# Patient Record
Sex: FEMALE | Race: WHITE | NOT HISPANIC OR LATINO | Employment: FULL TIME | URBAN - METROPOLITAN AREA
[De-identification: names, ages, dates, MRNs, and addresses within clinical notes are randomized per-mention and may not be internally consistent; named-entity substitution may affect disease eponyms.]

---

## 2023-04-13 DIAGNOSIS — E66.3 SEVERELY OVERWEIGHT: ICD-10-CM

## 2023-04-13 RX ORDER — TOPIRAMATE 50 MG/1
2 TABLET, FILM COATED ORAL NIGHTLY
COMMUNITY
Start: 2015-05-08 | End: 2023-04-13 | Stop reason: SDUPTHER

## 2023-04-14 RX ORDER — TOPIRAMATE 50 MG/1
100 TABLET, FILM COATED ORAL NIGHTLY
Qty: 180 TABLET | Refills: 0 | Status: SHIPPED | OUTPATIENT
Start: 2023-04-14 | End: 2023-12-19 | Stop reason: HOSPADM

## 2023-05-02 ENCOUNTER — DOCUMENTATION (OUTPATIENT)
Dept: PRIMARY CARE | Facility: CLINIC | Age: 53
End: 2023-05-02
Payer: COMMERCIAL

## 2023-05-03 ENCOUNTER — PATIENT OUTREACH (OUTPATIENT)
Dept: CARE COORDINATION | Facility: CLINIC | Age: 53
End: 2023-05-03
Payer: COMMERCIAL

## 2023-05-05 DIAGNOSIS — E78.5 HYPERLIPIDEMIA, UNSPECIFIED HYPERLIPIDEMIA TYPE: ICD-10-CM

## 2023-05-05 RX ORDER — ATORVASTATIN CALCIUM 40 MG/1
40 TABLET, FILM COATED ORAL DAILY
Qty: 90 TABLET | Refills: 0 | Status: SHIPPED | OUTPATIENT
Start: 2023-05-05 | End: 2023-12-19 | Stop reason: HOSPADM

## 2023-05-05 RX ORDER — ATORVASTATIN CALCIUM 40 MG/1
40 TABLET, FILM COATED ORAL DAILY
COMMUNITY
End: 2023-05-05 | Stop reason: SDUPTHER

## 2023-05-08 NOTE — PROGRESS NOTES
"Subjective   Chief Complaint   Patient presents with    Hospital F/U      Paulie is here today for after hospital F/U. Pt went to [facility] on 4/28/23-5/1/23 D/T pancreatitis and Potassium level was 2.5 and Lipase level of 816.        Patient ID: Paulie Byrd is a 52 y.o. female who presents for Hospital F/U  (Paulie is here today for after hospital F/U. Pt went to [facility] on 4/28/23-5/1/23 D/T pancreatitis and Potassium level was 2.5 and Lipase level of 816. )    HPI  Est 51 yo female presents today for hospital d/c follow up    Pt reports that she was admitted at [facility], [place] from 4/28/23-5/1/23  She states she left work on 4/27 in severe pain  Woke up 4/28 in severe pain and her friend took her to [facility], [place]  Lipase > 800; pt is recovering drug/alcohol addict, sober x 16 yrs   Pt says it is her gastroparesis and she does see Dr. Fish, who Rx her Protonix   Pts last EGD in Jan 2023---> there was food in her stomach after fasting > 24 hrs   Pt did gastric emptying study in Jan 2023 and it took > 200 min for food to pass  Is waiting to get approval for US/EGD study at [facility]   Pt has also had significant weight loss     Pt denies any N/V/D today  Negative abd pain     Pt had MRCP on 4/29----> \"acute pancreatitis, small LEFT renal cysts\"    Pt saw dermatology today for hair thinning; labs done     Review of Systems  All 13 systems were reviewed and are within normal limits except positive and pertinent negative responses which are noted below or in HPI.      Objective   /74   Pulse 81   Wt 68 kg (150 lb)   BMI 23.85 kg/m²        Physical Exam  Vitals reviewed.   HENT:      Mouth/Throat:      Mouth: Mucous membranes are moist.   Cardiovascular:      Pulses: Normal pulses.   Pulmonary:      Effort: Pulmonary effort is normal.   Abdominal:      General: Abdomen is flat. Bowel sounds are normal.   Skin:     General: Skin is warm and dry.   Neurological:      Mental Status: She is alert.   Psychiatric:    "      Mood and Affect: Mood normal.         Assessment/Plan   Problem List Items Addressed This Visit    None  Visit Diagnoses       Hospital discharge follow-up    -  Primary    Relevant Orders    Comprehensive Metabolic Panel    Magnesium    Vitamin B12    TSH with reflex to Free T4 if abnormal    Gastroparesis        Relevant Orders    Comprehensive Metabolic Panel    Magnesium    Vitamin B12    TSH with reflex to Free T4 if abnormal

## 2023-05-09 ENCOUNTER — OFFICE VISIT (OUTPATIENT)
Dept: PRIMARY CARE | Facility: CLINIC | Age: 53
End: 2023-05-09
Payer: COMMERCIAL

## 2023-05-09 ENCOUNTER — LAB (OUTPATIENT)
Dept: LAB | Facility: LAB | Age: 53
End: 2023-05-09
Payer: COMMERCIAL

## 2023-05-09 VITALS
SYSTOLIC BLOOD PRESSURE: 109 MMHG | BODY MASS INDEX: 23.85 KG/M2 | DIASTOLIC BLOOD PRESSURE: 74 MMHG | HEART RATE: 81 BPM | WEIGHT: 150 LBS

## 2023-05-09 DIAGNOSIS — K31.84 GASTROPARESIS: ICD-10-CM

## 2023-05-09 DIAGNOSIS — Z09 HOSPITAL DISCHARGE FOLLOW-UP: ICD-10-CM

## 2023-05-09 DIAGNOSIS — Z09 HOSPITAL DISCHARGE FOLLOW-UP: Primary | ICD-10-CM

## 2023-05-09 PROBLEM — N20.0 RIGHT NEPHROLITHIASIS: Status: ACTIVE | Noted: 2023-05-09

## 2023-05-09 PROBLEM — E05.00 GRAVES DISEASE: Status: ACTIVE | Noted: 2023-05-09

## 2023-05-09 PROBLEM — G47.9 SLEEP DISTURBANCE: Status: ACTIVE | Noted: 2023-05-09

## 2023-05-09 PROBLEM — F10.11 HISTORY OF ALCOHOL ABUSE: Status: ACTIVE | Noted: 2023-05-09

## 2023-05-09 PROBLEM — E78.5 HYPERLIPIDEMIA: Status: ACTIVE | Noted: 2023-05-09

## 2023-05-09 PROBLEM — M79.89 RIGHT LEG SWELLING: Status: ACTIVE | Noted: 2023-05-09

## 2023-05-09 PROBLEM — M25.50 ARTHRALGIA OF MULTIPLE JOINTS: Status: ACTIVE | Noted: 2023-05-09

## 2023-05-09 PROBLEM — E87.6 HYPOKALEMIA: Status: ACTIVE | Noted: 2023-05-09

## 2023-05-09 PROBLEM — R11.0 NAUSEA IN ADULT: Status: ACTIVE | Noted: 2023-05-09

## 2023-05-09 PROBLEM — F41.8 DEPRESSION WITH ANXIETY: Status: ACTIVE | Noted: 2023-05-09

## 2023-05-09 PROBLEM — N95.1 MENOPAUSAL SYMPTOMS: Status: ACTIVE | Noted: 2023-05-09

## 2023-05-09 PROBLEM — E55.9 VITAMIN D DEFICIENCY DISEASE: Status: ACTIVE | Noted: 2023-05-09

## 2023-05-09 PROBLEM — G25.81 RESTLESS LEGS SYNDROME: Status: ACTIVE | Noted: 2023-05-09

## 2023-05-09 PROBLEM — E03.8 SECONDARY HYPOTHYROIDISM: Status: ACTIVE | Noted: 2023-05-09

## 2023-05-09 LAB
ALANINE AMINOTRANSFERASE (SGPT) (U/L) IN SER/PLAS: 14 U/L (ref 7–45)
ALBUMIN (G/DL) IN SER/PLAS: 3.9 G/DL (ref 3.4–5)
ALKALINE PHOSPHATASE (U/L) IN SER/PLAS: 68 U/L (ref 33–110)
ANION GAP IN SER/PLAS: 12 MMOL/L (ref 10–20)
ASPARTATE AMINOTRANSFERASE (SGOT) (U/L) IN SER/PLAS: 13 U/L (ref 9–39)
BILIRUBIN TOTAL (MG/DL) IN SER/PLAS: 0.3 MG/DL (ref 0–1.2)
CALCIUM (MG/DL) IN SER/PLAS: 9 MG/DL (ref 8.6–10.3)
CARBON DIOXIDE, TOTAL (MMOL/L) IN SER/PLAS: 23 MMOL/L (ref 21–32)
CHLORIDE (MMOL/L) IN SER/PLAS: 111 MMOL/L (ref 98–107)
CREATININE (MG/DL) IN SER/PLAS: 0.87 MG/DL (ref 0.5–1.05)
FERRITIN (UG/LL) IN SER/PLAS: 38 UG/L (ref 8–150)
GFR FEMALE: 79 ML/MIN/1.73M2
GLUCOSE (MG/DL) IN SER/PLAS: 84 MG/DL (ref 74–99)
IRON (UG/DL) IN SER/PLAS: 65 UG/DL (ref 35–150)
IRON BINDING CAPACITY (UG/DL) IN SER/PLAS: 282 UG/DL (ref 240–445)
IRON SATURATION (%) IN SER/PLAS: 23 % (ref 25–45)
MAGNESIUM (MG/DL) IN SER/PLAS: 2.1 MG/DL (ref 1.6–2.4)
POTASSIUM (MMOL/L) IN SER/PLAS: 3.4 MMOL/L (ref 3.5–5.3)
PROTEIN TOTAL: 6.7 G/DL (ref 6.4–8.2)
SODIUM (MMOL/L) IN SER/PLAS: 143 MMOL/L (ref 136–145)
THYROTROPIN (MIU/L) IN SER/PLAS BY DETECTION LIMIT <= 0.05 MIU/L: 0.38 MIU/L (ref 0.44–3.98)
THYROXINE (T4) FREE (NG/DL) IN SER/PLAS: 1.98 NG/DL (ref 0.61–1.12)
UREA NITROGEN (MG/DL) IN SER/PLAS: 8 MG/DL (ref 6–23)

## 2023-05-09 PROCEDURE — 84439 ASSAY OF FREE THYROXINE: CPT

## 2023-05-09 PROCEDURE — 82607 VITAMIN B-12: CPT

## 2023-05-09 PROCEDURE — 83735 ASSAY OF MAGNESIUM: CPT

## 2023-05-09 PROCEDURE — 99213 OFFICE O/P EST LOW 20 MIN: CPT | Performed by: NURSE PRACTITIONER

## 2023-05-09 PROCEDURE — 36415 COLL VENOUS BLD VENIPUNCTURE: CPT

## 2023-05-09 PROCEDURE — 80053 COMPREHEN METABOLIC PANEL: CPT

## 2023-05-09 PROCEDURE — 84443 ASSAY THYROID STIM HORMONE: CPT

## 2023-05-09 PROCEDURE — 99495 TRANSJ CARE MGMT MOD F2F 14D: CPT | Performed by: NURSE PRACTITIONER

## 2023-05-09 RX ORDER — METOCLOPRAMIDE 5 MG/1
1 TABLET ORAL
COMMUNITY
Start: 2023-04-06 | End: 2023-06-30 | Stop reason: ALTCHOICE

## 2023-05-09 RX ORDER — LEVOTHYROXINE SODIUM 100 UG/1
100 TABLET ORAL
COMMUNITY
End: 2023-05-10 | Stop reason: SDUPTHER

## 2023-05-09 RX ORDER — DICYCLOMINE HYDROCHLORIDE 10 MG/1
CAPSULE ORAL
COMMUNITY
Start: 2023-01-19 | End: 2023-10-27 | Stop reason: ALTCHOICE

## 2023-05-09 RX ORDER — POTASSIUM CHLORIDE 1500 MG/1
1 TABLET, EXTENDED RELEASE ORAL 2 TIMES DAILY
COMMUNITY
Start: 2022-01-28 | End: 2023-05-10 | Stop reason: ALTCHOICE

## 2023-05-09 RX ORDER — DOCUSATE SODIUM 100 MG/1
CAPSULE, LIQUID FILLED ORAL 2 TIMES DAILY
COMMUNITY
Start: 2016-09-01 | End: 2023-12-15 | Stop reason: WASHOUT

## 2023-05-09 RX ORDER — PANTOPRAZOLE SODIUM 40 MG/1
40 TABLET, DELAYED RELEASE ORAL 2 TIMES DAILY
COMMUNITY
Start: 2023-05-02 | End: 2023-06-30 | Stop reason: ALTCHOICE

## 2023-05-09 RX ORDER — FLUTICASONE PROPIONATE 50 MCG
2 SPRAY, SUSPENSION (ML) NASAL DAILY PRN
COMMUNITY
Start: 2015-03-02 | End: 2023-12-19 | Stop reason: HOSPADM

## 2023-05-09 RX ORDER — GABAPENTIN 300 MG/1
CAPSULE ORAL
COMMUNITY
End: 2023-06-06 | Stop reason: SDUPTHER

## 2023-05-09 RX ORDER — BUPROPION HYDROCHLORIDE 300 MG/1
300 TABLET ORAL DAILY
COMMUNITY
End: 2023-10-27 | Stop reason: ALTCHOICE

## 2023-05-09 RX ORDER — DIPHENHYDRAMINE HCL 25 MG
50 CAPSULE ORAL NIGHTLY
COMMUNITY
Start: 2012-04-10 | End: 2023-12-19 | Stop reason: HOSPADM

## 2023-05-09 RX ORDER — IBUPROFEN 600 MG/1
TABLET ORAL 3 TIMES DAILY PRN
COMMUNITY
Start: 2016-09-01 | End: 2023-10-27 | Stop reason: ALTCHOICE

## 2023-05-09 NOTE — PATIENT INSTRUCTIONS
Thank you for seeing me today.  It was a pleasure to see you again!    See GI for f/u on gastroparesis    Schedule EGD with US per GI    RTC AS NEEDED

## 2023-05-10 DIAGNOSIS — E03.8 SECONDARY HYPOTHYROIDISM: ICD-10-CM

## 2023-05-10 DIAGNOSIS — E87.6 HYPOKALEMIA: Primary | ICD-10-CM

## 2023-05-10 LAB
ANTI-NUCLEAR ANTIBODY (ANA): NEGATIVE
CALCIDIOL (25 OH VITAMIN D3) (NG/ML) IN SER/PLAS: 71 NG/ML
COBALAMIN (VITAMIN B12) (PG/ML) IN SER/PLAS: 1337 PG/ML (ref 211–911)

## 2023-05-10 RX ORDER — LEVOTHYROXINE SODIUM 100 UG/1
TABLET ORAL
Qty: 90 TABLET | Refills: 3 | Status: SHIPPED | OUTPATIENT
Start: 2023-05-10 | End: 2023-07-31 | Stop reason: SDUPTHER

## 2023-05-10 RX ORDER — POTASSIUM CHLORIDE 750 MG/1
10 TABLET, FILM COATED, EXTENDED RELEASE ORAL DAILY
Qty: 30 TABLET | Refills: 0 | Status: SHIPPED | OUTPATIENT
Start: 2023-05-10 | End: 2023-05-24 | Stop reason: SDUPTHER

## 2023-05-22 ENCOUNTER — TELEPHONE (OUTPATIENT)
Dept: PRIMARY CARE | Facility: CLINIC | Age: 53
End: 2023-05-22
Payer: COMMERCIAL

## 2023-05-22 DIAGNOSIS — E87.6 HYPOKALEMIA: ICD-10-CM

## 2023-05-22 NOTE — TELEPHONE ENCOUNTER
Orders Placed This Encounter   Procedures    Potassium     Standing Status:   Future     Standing Expiration Date:   5/22/2024     Order Specific Question:   Release result to MyChart     Answer:   Immediate    Magnesium     Standing Status:   Future     Standing Expiration Date:   5/22/2024     Order Specific Question:   Release result to MyChart     Answer:   Immediate

## 2023-05-24 DIAGNOSIS — E87.6 HYPOKALEMIA: ICD-10-CM

## 2023-05-24 RX ORDER — POTASSIUM CHLORIDE 750 MG/1
10 TABLET, FILM COATED, EXTENDED RELEASE ORAL DAILY
Qty: 30 TABLET | Refills: 2 | Status: SHIPPED | OUTPATIENT
Start: 2023-05-24 | End: 2023-06-15

## 2023-06-06 DIAGNOSIS — G25.81 RESTLESS LEGS SYNDROME: ICD-10-CM

## 2023-06-07 RX ORDER — GABAPENTIN 300 MG/1
CAPSULE ORAL
Qty: 120 CAPSULE | Refills: 1 | Status: SHIPPED | OUTPATIENT
Start: 2023-06-07 | End: 2023-07-31 | Stop reason: SDUPTHER

## 2023-06-15 ENCOUNTER — LAB (OUTPATIENT)
Dept: LAB | Facility: LAB | Age: 53
End: 2023-06-15
Payer: COMMERCIAL

## 2023-06-15 DIAGNOSIS — R63.4 WEIGHT LOSS, UNINTENTIONAL: ICD-10-CM

## 2023-06-15 DIAGNOSIS — E87.6 HYPOKALEMIA: ICD-10-CM

## 2023-06-15 DIAGNOSIS — B89 PARASITE INFECTION: Primary | ICD-10-CM

## 2023-06-15 DIAGNOSIS — R63.4 WEIGHT LOSS, UNINTENTIONAL: Primary | ICD-10-CM

## 2023-06-15 DIAGNOSIS — E03.8 SECONDARY HYPOTHYROIDISM: ICD-10-CM

## 2023-06-15 DIAGNOSIS — R19.7 DIARRHEA, UNSPECIFIED TYPE: ICD-10-CM

## 2023-06-15 DIAGNOSIS — R63.4 UNINTENTIONAL WEIGHT LOSS: Primary | ICD-10-CM

## 2023-06-15 DIAGNOSIS — R53.1 WEAKNESS GENERALIZED: ICD-10-CM

## 2023-06-15 DIAGNOSIS — Z00.00 HEALTHCARE MAINTENANCE: Primary | ICD-10-CM

## 2023-06-15 DIAGNOSIS — Z00.00 HEALTHCARE MAINTENANCE: ICD-10-CM

## 2023-06-15 DIAGNOSIS — R63.4 UNINTENTIONAL WEIGHT LOSS: ICD-10-CM

## 2023-06-15 LAB
ALANINE AMINOTRANSFERASE (SGPT) (U/L) IN SER/PLAS: 20 U/L (ref 7–45)
ALBUMIN (G/DL) IN SER/PLAS: 4.6 G/DL (ref 3.4–5)
ALKALINE PHOSPHATASE (U/L) IN SER/PLAS: 78 U/L (ref 33–110)
ANION GAP IN SER/PLAS: 18 MMOL/L (ref 10–20)
ASPARTATE AMINOTRANSFERASE (SGOT) (U/L) IN SER/PLAS: 18 U/L (ref 9–39)
BASOPHILS (10*3/UL) IN BLOOD BY AUTOMATED COUNT: 0.07 X10E9/L (ref 0–0.1)
BASOPHILS/100 LEUKOCYTES IN BLOOD BY AUTOMATED COUNT: 0.6 % (ref 0–2)
BILIRUBIN TOTAL (MG/DL) IN SER/PLAS: 0.5 MG/DL (ref 0–1.2)
C REACTIVE PROTEIN (MG/L) IN SER/PLAS: 0.13 MG/DL
CALCIUM (MG/DL) IN SER/PLAS: 9.4 MG/DL (ref 8.6–10.3)
CARBON DIOXIDE, TOTAL (MMOL/L) IN SER/PLAS: 18 MMOL/L (ref 21–32)
CHLORIDE (MMOL/L) IN SER/PLAS: 105 MMOL/L (ref 98–107)
CREATININE (MG/DL) IN SER/PLAS: 1.1 MG/DL (ref 0.5–1.05)
EOSINOPHILS (10*3/UL) IN BLOOD BY AUTOMATED COUNT: 0.07 X10E9/L (ref 0–0.7)
EOSINOPHILS/100 LEUKOCYTES IN BLOOD BY AUTOMATED COUNT: 0.6 % (ref 0–6)
ERYTHROCYTE DISTRIBUTION WIDTH (RATIO) BY AUTOMATED COUNT: 14.5 % (ref 11.5–14.5)
ERYTHROCYTE MEAN CORPUSCULAR HEMOGLOBIN CONCENTRATION (G/DL) BY AUTOMATED: 33.5 G/DL (ref 32–36)
ERYTHROCYTE MEAN CORPUSCULAR VOLUME (FL) BY AUTOMATED COUNT: 92 FL (ref 80–100)
ERYTHROCYTES (10*6/UL) IN BLOOD BY AUTOMATED COUNT: 4.47 X10E12/L (ref 4–5.2)
GFR FEMALE: 60 ML/MIN/1.73M2
GLUCOSE (MG/DL) IN SER/PLAS: 102 MG/DL (ref 74–99)
HEMATOCRIT (%) IN BLOOD BY AUTOMATED COUNT: 41.2 % (ref 36–46)
HEMOGLOBIN (G/DL) IN BLOOD: 13.8 G/DL (ref 12–16)
HEPATITIS A VIRUS IGM AB PRESENCE IN SER/PLAS BY IMMUNOASSAY: NONREACTIVE
HEPATITIS B VIRUS CORE IGM AB PRESENCE IN SER/PLAS BY IMMUNOASSY: NONREACTIVE
HEPATITIS B VIRUS SURFACE AG PRESENCE IN SERUM: NONREACTIVE
HEPATITIS C VIRUS AB PRESENCE IN SERUM: NONREACTIVE
HIV 1/ 2 AG/AB SCREEN: NONREACTIVE
IMMATURE GRANULOCYTES/100 LEUKOCYTES IN BLOOD BY AUTOMATED COUNT: 0.4 % (ref 0–0.9)
IRON (UG/DL) IN SER/PLAS: 124 UG/DL (ref 35–150)
IRON BINDING CAPACITY (UG/DL) IN SER/PLAS: 304 UG/DL (ref 240–445)
IRON SATURATION (%) IN SER/PLAS: 41 % (ref 25–45)
LEUKOCYTES (10*3/UL) IN BLOOD BY AUTOMATED COUNT: 11.7 X10E9/L (ref 4.4–11.3)
LYMPHOCYTES (10*3/UL) IN BLOOD BY AUTOMATED COUNT: 4.42 X10E9/L (ref 1.2–4.8)
LYMPHOCYTES/100 LEUKOCYTES IN BLOOD BY AUTOMATED COUNT: 37.8 % (ref 13–44)
MAGNESIUM (MG/DL) IN SER/PLAS: 2.08 MG/DL (ref 1.6–2.4)
MONOCYTES (10*3/UL) IN BLOOD BY AUTOMATED COUNT: 0.6 X10E9/L (ref 0.1–1)
MONOCYTES/100 LEUKOCYTES IN BLOOD BY AUTOMATED COUNT: 5.1 % (ref 2–10)
NEUTROPHILS (10*3/UL) IN BLOOD BY AUTOMATED COUNT: 6.48 X10E9/L (ref 1.2–7.7)
NEUTROPHILS/100 LEUKOCYTES IN BLOOD BY AUTOMATED COUNT: 55.5 % (ref 40–80)
PLATELETS (10*3/UL) IN BLOOD AUTOMATED COUNT: 451 X10E9/L (ref 150–450)
POTASSIUM (MMOL/L) IN SER/PLAS: 2.9 MMOL/L (ref 3.5–5.3)
PROTEIN TOTAL: 7.1 G/DL (ref 6.4–8.2)
SEDIMENTATION RATE, ERYTHROCYTE: 16 MM/H (ref 0–30)
SODIUM (MMOL/L) IN SER/PLAS: 138 MMOL/L (ref 136–145)
THYROTROPIN (MIU/L) IN SER/PLAS BY DETECTION LIMIT <= 0.05 MIU/L: 2.1 MIU/L (ref 0.44–3.98)
UREA NITROGEN (MG/DL) IN SER/PLAS: 15 MG/DL (ref 6–23)

## 2023-06-15 PROCEDURE — 83540 ASSAY OF IRON: CPT

## 2023-06-15 PROCEDURE — 86140 C-REACTIVE PROTEIN: CPT

## 2023-06-15 PROCEDURE — 85652 RBC SED RATE AUTOMATED: CPT

## 2023-06-15 PROCEDURE — 83735 ASSAY OF MAGNESIUM: CPT

## 2023-06-15 PROCEDURE — 83550 IRON BINDING TEST: CPT

## 2023-06-15 PROCEDURE — 87389 HIV-1 AG W/HIV-1&-2 AB AG IA: CPT

## 2023-06-15 PROCEDURE — 80074 ACUTE HEPATITIS PANEL: CPT

## 2023-06-15 PROCEDURE — 84443 ASSAY THYROID STIM HORMONE: CPT

## 2023-06-15 PROCEDURE — 86663 EPSTEIN-BARR ANTIBODY: CPT

## 2023-06-15 PROCEDURE — 87506 IADNA-DNA/RNA PROBE TQ 6-11: CPT

## 2023-06-15 PROCEDURE — 86665 EPSTEIN-BARR CAPSID VCA: CPT

## 2023-06-15 PROCEDURE — 36415 COLL VENOUS BLD VENIPUNCTURE: CPT

## 2023-06-15 PROCEDURE — 86664 EPSTEIN-BARR NUCLEAR ANTIGEN: CPT

## 2023-06-15 PROCEDURE — 85025 COMPLETE CBC W/AUTO DIFF WBC: CPT

## 2023-06-15 PROCEDURE — 83516 IMMUNOASSAY NONANTIBODY: CPT

## 2023-06-15 PROCEDURE — 80053 COMPREHEN METABOLIC PANEL: CPT

## 2023-06-15 RX ORDER — POTASSIUM CHLORIDE 1500 MG/1
40 TABLET, EXTENDED RELEASE ORAL 3 TIMES DAILY
Qty: 30 TABLET | Refills: 0 | Status: SHIPPED | OUTPATIENT
Start: 2023-06-15 | End: 2023-07-26

## 2023-06-16 ENCOUNTER — LAB (OUTPATIENT)
Dept: LAB | Facility: LAB | Age: 53
End: 2023-06-16
Payer: COMMERCIAL

## 2023-06-16 DIAGNOSIS — R63.4 WEIGHT LOSS, UNINTENTIONAL: ICD-10-CM

## 2023-06-16 DIAGNOSIS — E87.6 HYPOKALEMIA: ICD-10-CM

## 2023-06-16 LAB
DEAMIDATED GLIADIN PEPTIDE IGA: <1 U/ML (ref 0–14)
DEAMIDATED GLIADIN PEPTIDE IGA: <1 U/ML (ref 0–14)
DEAMIDATED GLIADIN PEPTIDE IGG: <1 U/ML (ref 0–14)
DEAMIDATED GLIADIN PEPTIDE IGG: <1 U/ML (ref 0–14)
EBV INTERPRETATION: ABNORMAL
EPSTEIN-BARR VCA IGG: POSITIVE
EPSTEIN-BARR VCA IGM: POSITIVE
EPSTEIN-BARR VIRUS EARLY ANTIGEN ANTIBODY, IGG: NEGATIVE
EPSTIEN-BARR NUCLEAR ANTIGEN AB: POSITIVE
HIV 1/ 2 AG/AB SCREEN: NONREACTIVE
POTASSIUM (MMOL/L) IN SER/PLAS: 3.3 MMOL/L (ref 3.5–5.3)
TISSUE TRANSGLUTAMINASE IGG: <1 U/ML (ref 0–14)
TISSUE TRANSGLUTAMINASE IGG: <1 U/ML (ref 0–14)
TISSUE TRANSGLUTAMINASE, IGA: 1 U/ML (ref 0–14)
TISSUE TRANSGLUTAMINASE, IGA: <1 U/ML (ref 0–14)

## 2023-06-16 PROCEDURE — 84132 ASSAY OF SERUM POTASSIUM: CPT

## 2023-06-16 PROCEDURE — 87389 HIV-1 AG W/HIV-1&-2 AB AG IA: CPT

## 2023-06-16 PROCEDURE — 83516 IMMUNOASSAY NONANTIBODY: CPT

## 2023-06-16 PROCEDURE — 36415 COLL VENOUS BLD VENIPUNCTURE: CPT

## 2023-06-17 DIAGNOSIS — E87.6 HYPOKALEMIA: ICD-10-CM

## 2023-06-17 DIAGNOSIS — R10.13 EPIGASTRIC PAIN: Primary | ICD-10-CM

## 2023-06-18 LAB
CAMPYLOBACTER GP: NOT DETECTED
NOROVIRUS GI/GII: NOT DETECTED
ROTAVIRUS A: NOT DETECTED
SALMONELLA SP.: NOT DETECTED
SHIGA TOXIN 1: NOT DETECTED
SHIGA TOXIN 2: NOT DETECTED
SHIGELLA SP.: NOT DETECTED
VIBRIO GRP.: NOT DETECTED
YERSINIA ENTEROCOLITICA: NOT DETECTED

## 2023-06-19 ENCOUNTER — LAB (OUTPATIENT)
Dept: LAB | Facility: LAB | Age: 53
End: 2023-06-19
Payer: COMMERCIAL

## 2023-06-19 DIAGNOSIS — E87.6 HYPOKALEMIA: ICD-10-CM

## 2023-06-19 LAB
ANION GAP IN SER/PLAS: 14 MMOL/L (ref 10–20)
C. DIFFICILE TOXIN, PCR: NORMAL
CALCIUM (MG/DL) IN SER/PLAS: 8.3 MG/DL (ref 8.6–10.3)
CARBON DIOXIDE, TOTAL (MMOL/L) IN SER/PLAS: 19 MMOL/L (ref 21–32)
CHLORIDE (MMOL/L) IN SER/PLAS: 112 MMOL/L (ref 98–107)
CLOSTRIDIUM DIFFICILE NAP 1 STRAIN (PRESUMPTIVE): NORMAL
CREATININE (MG/DL) IN SER/PLAS: 0.9 MG/DL (ref 0.5–1.05)
GFR FEMALE: 76 ML/MIN/1.73M2
GLUCOSE (MG/DL) IN SER/PLAS: 96 MG/DL (ref 74–99)
POTASSIUM (MMOL/L) IN SER/PLAS: 3.9 MMOL/L (ref 3.5–5.3)
SODIUM (MMOL/L) IN SER/PLAS: 141 MMOL/L (ref 136–145)
UREA NITROGEN (MG/DL) IN SER/PLAS: 11 MG/DL (ref 6–23)

## 2023-06-19 PROCEDURE — 80048 BASIC METABOLIC PNL TOTAL CA: CPT

## 2023-06-19 PROCEDURE — 36415 COLL VENOUS BLD VENIPUNCTURE: CPT

## 2023-06-20 LAB
CALPROTECTIN, STOOL: NORMAL
CRYPTOSPORIDIUM ANTIGEN-DATA CONVERSION: NORMAL
GIARDIA LAMBLIA AG-DATA CONVERSION: NORMAL
OVA + PARASITE EXAM: NORMAL
PANCREATIC ELASTASE, FECAL: NORMAL

## 2023-06-21 ENCOUNTER — OFFICE VISIT (OUTPATIENT)
Dept: PRIMARY CARE | Facility: CLINIC | Age: 53
End: 2023-06-21
Payer: COMMERCIAL

## 2023-06-21 ENCOUNTER — LAB (OUTPATIENT)
Dept: LAB | Facility: LAB | Age: 53
End: 2023-06-21
Payer: COMMERCIAL

## 2023-06-21 VITALS — HEART RATE: 96 BPM | DIASTOLIC BLOOD PRESSURE: 69 MMHG | SYSTOLIC BLOOD PRESSURE: 96 MMHG | OXYGEN SATURATION: 97 %

## 2023-06-21 DIAGNOSIS — E87.6 HYPOKALEMIA: ICD-10-CM

## 2023-06-21 DIAGNOSIS — R19.7 DIARRHEA, UNSPECIFIED TYPE: ICD-10-CM

## 2023-06-21 DIAGNOSIS — R10.13 EPIGASTRIC PAIN: ICD-10-CM

## 2023-06-21 LAB
ANION GAP IN SER/PLAS: 18 MMOL/L (ref 10–20)
CALCIUM (MG/DL) IN SER/PLAS: 9.6 MG/DL (ref 8.6–10.3)
CARBON DIOXIDE, TOTAL (MMOL/L) IN SER/PLAS: 17 MMOL/L (ref 21–32)
CHLORIDE (MMOL/L) IN SER/PLAS: 110 MMOL/L (ref 98–107)
CREATININE (MG/DL) IN SER/PLAS: 1.15 MG/DL (ref 0.5–1.05)
GFR FEMALE: 57 ML/MIN/1.73M2
GLUCOSE (MG/DL) IN SER/PLAS: 90 MG/DL (ref 74–99)
LIPASE (U/L) IN SER/PLAS: 73 U/L (ref 9–82)
POTASSIUM (MMOL/L) IN SER/PLAS: 4.2 MMOL/L (ref 3.5–5.3)
SODIUM (MMOL/L) IN SER/PLAS: 141 MMOL/L (ref 136–145)
UREA NITROGEN (MG/DL) IN SER/PLAS: 15 MG/DL (ref 6–23)

## 2023-06-21 PROCEDURE — 87493 C DIFF AMPLIFIED PROBE: CPT

## 2023-06-21 PROCEDURE — 83690 ASSAY OF LIPASE: CPT

## 2023-06-21 PROCEDURE — 83993 ASSAY FOR CALPROTECTIN FECAL: CPT

## 2023-06-21 PROCEDURE — 36415 COLL VENOUS BLD VENIPUNCTURE: CPT

## 2023-06-21 PROCEDURE — 87328 CRYPTOSPORIDIUM AG IA: CPT

## 2023-06-21 PROCEDURE — 83520 IMMUNOASSAY QUANT NOS NONAB: CPT

## 2023-06-21 PROCEDURE — 80048 BASIC METABOLIC PNL TOTAL CA: CPT

## 2023-06-21 PROCEDURE — 99213 OFFICE O/P EST LOW 20 MIN: CPT | Performed by: INTERNAL MEDICINE

## 2023-06-21 PROCEDURE — 87329 GIARDIA AG IA: CPT

## 2023-06-21 PROCEDURE — 87177 OVA AND PARASITES SMEARS: CPT

## 2023-06-21 RX ORDER — SPIRONOLACTONE 25 MG/1
50 TABLET ORAL DAILY
COMMUNITY
Start: 2023-06-07 | End: 2023-10-13

## 2023-06-21 NOTE — PROGRESS NOTES
Subjective   Patient ID: Paulie Byrd is a 52 y.o. female who presents for Abdominal Pain.    HPI   Patient reports water, non bloody diarrhea and abd pain x several weeks. Pain mostly epigastric and mid abdomen. Some nausea but no vomiting. Worse after eating. No EtOH use, had previous lap myah. No fevers, chills, night sweats. No recent travel or abx use     Review of Systems   All other systems reviewed and are negative.      Objective   There were no vitals taken for this visit.    Physical Exam  Constitutional:       Appearance: Normal appearance.   HENT:      Head: Normocephalic and atraumatic.   Cardiovascular:      Rate and Rhythm: Normal rate and regular rhythm.      Heart sounds: Normal heart sounds. No murmur heard.     No gallop.   Pulmonary:      Effort: Pulmonary effort is normal. No respiratory distress.      Breath sounds: No wheezing or rales.   Abdominal:      General: Abdomen is flat. There is no distension.      Palpations: Abdomen is soft.      Tenderness: There is abdominal tenderness. There is no guarding or rebound.   Skin:     General: Skin is warm and dry.      Findings: No rash.   Neurological:      Mental Status: She is alert and oriented to person, place, and time. Mental status is at baseline.   Psychiatric:         Mood and Affect: Mood normal.         Behavior: Behavior normal.         Assessment/Plan       #Abdominal pain , diarrhea   -Stool studies pending  -order CT a/p STAT to r/o pancreatitis  -Replete K and Mg   -Refer to GI

## 2023-06-22 LAB — C. DIFFICILE TOXIN, PCR: NOT DETECTED

## 2023-06-22 RX ORDER — TINIDAZOLE 500 MG/1
2 TABLET ORAL ONCE
Qty: 4 TABLET | Refills: 0 | Status: SHIPPED | OUTPATIENT
Start: 2023-06-22 | End: 2023-06-22

## 2023-06-27 LAB
CRYPTOSPORIDIUM ANTIGEN-DATA CONVERSION: NEGATIVE
GIARDIA LAMBLIA AG-DATA CONVERSION: NEGATIVE
OVA + PARASITE EXAM: NEGATIVE

## 2023-06-29 LAB
CALPROTECTIN, STOOL: <5 UG/G
PANCREATIC ELASTASE, FECAL: <10 UG/G

## 2023-06-30 DIAGNOSIS — K21.9 GASTROESOPHAGEAL REFLUX DISEASE WITHOUT ESOPHAGITIS: Primary | ICD-10-CM

## 2023-06-30 LAB
C. DIFFICILE TOXIN, PCR: NOT DETECTED
CAMPYLOBACTER GP: NOT DETECTED
NOROVIRUS GI/GII: NOT DETECTED
ROTAVIRUS A: NOT DETECTED
SALMONELLA SP.: NOT DETECTED
SHIGA TOXIN 1: NOT DETECTED
SHIGA TOXIN 2: NOT DETECTED
SHIGELLA SP.: NOT DETECTED
VIBRIO GRP.: NOT DETECTED
YERSINIA ENTEROCOLITICA: NOT DETECTED

## 2023-06-30 RX ORDER — OMEPRAZOLE 20 MG/1
20 CAPSULE, DELAYED RELEASE ORAL DAILY
Qty: 30 CAPSULE | Refills: 5 | Status: SHIPPED | OUTPATIENT
Start: 2023-06-30 | End: 2023-10-27 | Stop reason: SDUPTHER

## 2023-07-05 LAB
CALPROTECTIN, STOOL: 12 UG/G
LACTOFERRIN, FECAL, QUANT.: NEGATIVE
PANCREATIC ELASTASE, FECAL: 32 UG/G

## 2023-07-31 ENCOUNTER — LAB (OUTPATIENT)
Dept: LAB | Facility: LAB | Age: 53
End: 2023-07-31
Payer: COMMERCIAL

## 2023-07-31 DIAGNOSIS — R21 RASH: Primary | ICD-10-CM

## 2023-07-31 DIAGNOSIS — E87.6 HYPOKALEMIA: ICD-10-CM

## 2023-07-31 DIAGNOSIS — R21 RASH: ICD-10-CM

## 2023-07-31 DIAGNOSIS — E03.8 SECONDARY HYPOTHYROIDISM: ICD-10-CM

## 2023-07-31 DIAGNOSIS — G25.81 RESTLESS LEGS SYNDROME: ICD-10-CM

## 2023-07-31 DIAGNOSIS — B37.0 THRUSH: ICD-10-CM

## 2023-07-31 LAB
ANION GAP IN SER/PLAS: 14 MMOL/L (ref 10–20)
CALCIUM (MG/DL) IN SER/PLAS: 9.4 MG/DL (ref 8.6–10.6)
CARBON DIOXIDE, TOTAL (MMOL/L) IN SER/PLAS: 25 MMOL/L (ref 21–32)
CHLORIDE (MMOL/L) IN SER/PLAS: 109 MMOL/L (ref 98–107)
CREATININE (MG/DL) IN SER/PLAS: 0.84 MG/DL (ref 0.5–1.05)
GFR FEMALE: 83 ML/MIN/1.73M2
GLUCOSE (MG/DL) IN SER/PLAS: 98 MG/DL (ref 74–99)
POTASSIUM (MMOL/L) IN SER/PLAS: 4.2 MMOL/L (ref 3.5–5.3)
SODIUM (MMOL/L) IN SER/PLAS: 144 MMOL/L (ref 136–145)
UREA NITROGEN (MG/DL) IN SER/PLAS: 11 MG/DL (ref 6–23)

## 2023-07-31 PROCEDURE — 86765 RUBEOLA ANTIBODY: CPT

## 2023-07-31 PROCEDURE — 80048 BASIC METABOLIC PNL TOTAL CA: CPT

## 2023-07-31 PROCEDURE — 36415 COLL VENOUS BLD VENIPUNCTURE: CPT

## 2023-07-31 RX ORDER — GABAPENTIN 300 MG/1
CAPSULE ORAL
Qty: 360 CAPSULE | Refills: 3 | Status: SHIPPED | OUTPATIENT
Start: 2023-07-31 | End: 2024-05-01 | Stop reason: SDUPTHER

## 2023-07-31 RX ORDER — LEVOTHYROXINE SODIUM 100 UG/1
100 TABLET ORAL
Qty: 90 TABLET | Refills: 3 | Status: SHIPPED | OUTPATIENT
Start: 2023-07-31 | End: 2024-02-09 | Stop reason: ALTCHOICE

## 2023-07-31 RX ORDER — NYSTATIN 100000 [USP'U]/ML
5 SUSPENSION ORAL 4 TIMES DAILY
Qty: 280 ML | Refills: 0 | Status: SHIPPED | OUTPATIENT
Start: 2023-07-31 | End: 2023-08-14

## 2023-08-03 LAB — MEASLES, RUBEOLA, ANTIBODY IGM: 0.31 AU (ref 0–0.79)

## 2023-08-21 DIAGNOSIS — B37.9 CANDIDOSIS: Primary | ICD-10-CM

## 2023-08-21 RX ORDER — FLUCONAZOLE 150 MG/1
150 TABLET ORAL
Qty: 2 TABLET | Refills: 1 | Status: SHIPPED | OUTPATIENT
Start: 2023-08-21 | End: 2023-12-19 | Stop reason: HOSPADM

## 2023-08-21 NOTE — TELEPHONE ENCOUNTER
Requested Prescriptions     Pending Prescriptions Disp Refills    fluconazole (Diflucan) 150 mg tablet 2 tablet 1     Sig: Take 1 tablet (150 mg) by mouth 1 (one) time per week.

## 2023-10-13 DIAGNOSIS — L65.9 ALOPECIA: Primary | ICD-10-CM

## 2023-10-13 RX ORDER — SPIRONOLACTONE 25 MG/1
75 TABLET ORAL DAILY
Qty: 90 TABLET | Refills: 1 | Status: SHIPPED | OUTPATIENT
Start: 2023-10-13 | End: 2023-11-01

## 2023-10-27 DIAGNOSIS — L65.8 FEMALE PATTERN ALOPECIA: Primary | ICD-10-CM

## 2023-10-27 DIAGNOSIS — F41.8 DEPRESSION WITH ANXIETY: Primary | ICD-10-CM

## 2023-10-27 DIAGNOSIS — K21.9 GASTROESOPHAGEAL REFLUX DISEASE WITHOUT ESOPHAGITIS: ICD-10-CM

## 2023-10-27 RX ORDER — BUPROPION HYDROCHLORIDE 150 MG/1
150 TABLET ORAL EVERY MORNING
Qty: 30 TABLET | Refills: 3 | Status: SHIPPED | OUTPATIENT
Start: 2023-10-27 | End: 2024-06-07 | Stop reason: WASHOUT

## 2023-10-27 RX ORDER — OMEPRAZOLE 20 MG/1
20 CAPSULE, DELAYED RELEASE ORAL DAILY
Qty: 90 CAPSULE | Refills: 3 | Status: SHIPPED | OUTPATIENT
Start: 2023-10-27 | End: 2023-12-19 | Stop reason: HOSPADM

## 2023-10-31 ENCOUNTER — LAB (OUTPATIENT)
Dept: LAB | Facility: LAB | Age: 53
End: 2023-10-31
Payer: COMMERCIAL

## 2023-10-31 DIAGNOSIS — L65.8 FEMALE PATTERN ALOPECIA: ICD-10-CM

## 2023-10-31 LAB
ANION GAP SERPL CALC-SCNC: 14 MMOL/L (ref 10–20)
BUN SERPL-MCNC: 12 MG/DL (ref 6–23)
CALCIUM SERPL-MCNC: 10 MG/DL (ref 8.6–10.6)
CHLORIDE SERPL-SCNC: 108 MMOL/L (ref 98–107)
CO2 SERPL-SCNC: 25 MMOL/L (ref 21–32)
CREAT SERPL-MCNC: 0.91 MG/DL (ref 0.5–1.05)
GFR SERPL CREATININE-BSD FRML MDRD: 75 ML/MIN/1.73M*2
GLUCOSE SERPL-MCNC: 94 MG/DL (ref 74–99)
POTASSIUM SERPL-SCNC: 4 MMOL/L (ref 3.5–5.3)
SODIUM SERPL-SCNC: 143 MMOL/L (ref 136–145)

## 2023-10-31 PROCEDURE — 36415 COLL VENOUS BLD VENIPUNCTURE: CPT

## 2023-10-31 PROCEDURE — 80048 BASIC METABOLIC PNL TOTAL CA: CPT

## 2023-11-01 DIAGNOSIS — L70.0 ACNE VULGARIS: Primary | ICD-10-CM

## 2023-11-01 RX ORDER — SPIRONOLACTONE 25 MG/1
TABLET ORAL
Qty: 90 TABLET | Refills: 11 | Status: SHIPPED | OUTPATIENT
Start: 2023-11-01 | End: 2023-11-07

## 2023-11-03 PROBLEM — D22.70 MELANOCYTIC NEVI OF UNSPECIFIED LOWER LIMB, INCLUDING HIP: Status: ACTIVE | Noted: 2023-08-08

## 2023-11-03 PROBLEM — L81.4 OTHER MELANIN HYPERPIGMENTATION: Status: ACTIVE | Noted: 2023-08-08

## 2023-11-03 PROBLEM — D22.9 ATYPICAL MOLE: Status: ACTIVE | Noted: 2023-11-03

## 2023-11-03 PROBLEM — F41.9 ANXIETY: Status: ACTIVE | Noted: 2023-11-03

## 2023-11-03 PROBLEM — L65.0 TELOGEN EFFLUVIUM: Status: ACTIVE | Noted: 2023-08-08

## 2023-11-03 PROBLEM — L82.1 OTHER SEBORRHEIC KERATOSIS: Status: ACTIVE | Noted: 2023-08-08

## 2023-11-03 PROBLEM — Z86.0100 HISTORY OF COLONIC POLYPS: Status: ACTIVE | Noted: 2023-11-03

## 2023-11-03 PROBLEM — Z86.010 HISTORY OF COLONIC POLYPS: Status: ACTIVE | Noted: 2023-11-03

## 2023-11-03 PROBLEM — L65.9 HAIR LOSS: Status: ACTIVE | Noted: 2023-08-08

## 2023-11-03 PROBLEM — R73.03 PREDIABETES: Status: ACTIVE | Noted: 2023-11-03

## 2023-11-03 PROBLEM — L90.5 SCAR CONDITION AND FIBROSIS OF SKIN: Status: ACTIVE | Noted: 2023-08-08

## 2023-11-03 PROBLEM — L91.8 OTHER HYPERTROPHIC DISORDERS OF THE SKIN: Status: ACTIVE | Noted: 2023-08-08

## 2023-11-03 PROBLEM — D22.5 MELANOCYTIC NEVI OF TRUNK: Status: ACTIVE | Noted: 2023-08-08

## 2023-11-03 PROBLEM — K29.50 GASTRITIS, CHRONIC: Status: ACTIVE | Noted: 2023-11-03

## 2023-11-03 PROBLEM — D22.60 MELANOCYTIC NEVI OF UNSPECIFIED UPPER LIMB, INCLUDING SHOULDER: Status: ACTIVE | Noted: 2023-08-08

## 2023-11-03 PROBLEM — J30.2 SEASONAL ALLERGIES: Status: ACTIVE | Noted: 2023-11-03

## 2023-11-03 PROBLEM — D18.01 HEMANGIOMA OF SKIN AND SUBCUTANEOUS TISSUE: Status: ACTIVE | Noted: 2023-08-08

## 2023-11-03 RX ORDER — LORATADINE 10 MG/1
10 TABLET ORAL DAILY PRN
COMMUNITY
End: 2023-12-19 | Stop reason: HOSPADM

## 2023-11-03 RX ORDER — NYSTATIN 100000 [USP'U]/ML
4 SUSPENSION ORAL 4 TIMES DAILY
COMMUNITY
Start: 2020-06-05 | End: 2023-12-15 | Stop reason: ENTERED-IN-ERROR

## 2023-11-03 RX ORDER — HYDROXYZINE HYDROCHLORIDE 50 MG/1
50 TABLET, FILM COATED ORAL EVERY 8 HOURS PRN
COMMUNITY
Start: 2021-01-14 | End: 2023-12-15 | Stop reason: ENTERED-IN-ERROR

## 2023-11-03 RX ORDER — PANCRELIPASE LIPASE, PANCRELIPASE PROTEASE, PANCRELIPASE AMYLASE 40000; 126000; 168000 [USP'U]/1; [USP'U]/1; [USP'U]/1
2 CAPSULE, DELAYED RELEASE ORAL 3 TIMES DAILY
COMMUNITY
Start: 2023-06-29 | End: 2024-01-27 | Stop reason: HOSPADM

## 2023-11-03 RX ORDER — DICYCLOMINE HYDROCHLORIDE 10 MG/1
10 CAPSULE ORAL 4 TIMES DAILY PRN
COMMUNITY
Start: 2023-01-19 | End: 2023-12-15 | Stop reason: WASHOUT

## 2023-11-03 RX ORDER — NAPROXEN SODIUM 220 MG/1
1 TABLET ORAL DAILY
COMMUNITY
End: 2023-12-15 | Stop reason: ENTERED-IN-ERROR

## 2023-11-03 RX ORDER — IBUPROFEN 200 MG
1 TABLET ORAL EVERY 6 HOURS PRN
COMMUNITY
Start: 2016-10-20 | End: 2023-12-19 | Stop reason: HOSPADM

## 2023-11-03 RX ORDER — BUPROPION HYDROCHLORIDE 300 MG/1
1 TABLET ORAL EVERY MORNING
COMMUNITY
Start: 2020-12-02 | End: 2023-12-19 | Stop reason: HOSPADM

## 2023-11-03 RX ORDER — METOCLOPRAMIDE 5 MG/1
1 TABLET ORAL 3 TIMES DAILY PRN
COMMUNITY
Start: 2023-04-06 | End: 2023-12-15 | Stop reason: WASHOUT

## 2023-11-03 RX ORDER — CHOLECALCIFEROL (VITAMIN D3) 125 MCG
125 CAPSULE ORAL 3 TIMES WEEKLY
COMMUNITY
End: 2023-12-15 | Stop reason: ENTERED-IN-ERROR

## 2023-11-03 RX ORDER — PANTOPRAZOLE SODIUM 40 MG/1
1 TABLET, DELAYED RELEASE ORAL DAILY
COMMUNITY
Start: 2023-02-28 | End: 2023-12-15 | Stop reason: ENTERED-IN-ERROR

## 2023-11-07 ENCOUNTER — OFFICE VISIT (OUTPATIENT)
Dept: DERMATOLOGY | Facility: CLINIC | Age: 53
End: 2023-11-07
Payer: COMMERCIAL

## 2023-11-07 DIAGNOSIS — L64.9 ANDROGENETIC ALOPECIA: Primary | ICD-10-CM

## 2023-11-07 PROCEDURE — 99213 OFFICE O/P EST LOW 20 MIN: CPT | Performed by: NURSE PRACTITIONER

## 2023-11-07 RX ORDER — SPIRONOLACTONE 100 MG/1
TABLET, FILM COATED ORAL
Qty: 30 TABLET | Refills: 3 | Status: SHIPPED | OUTPATIENT
Start: 2023-11-07 | End: 2024-01-23

## 2023-11-07 NOTE — PROGRESS NOTES
Subjective     Paulie Byrd is a 53 y.o. female who presents for the following: Alopecia (3 month follow up).     Review of Systems:  No other skin or systemic complaints other than what is documented elsewhere in the note.    The following portions of the chart were reviewed this encounter and updated as appropriate:   Tobacco  Allergies  Meds  Problems  Med Hx  Surg Hx         Skin Cancer History  No skin cancer on file.      Specialty Problems          Dermatology Problems    Hair loss    Hemangioma of skin and subcutaneous tissue    Melanocytic nevi of trunk    Melanocytic nevi of unspecified lower limb, including hip    Melanocytic nevi of unspecified upper limb, including shoulder    Other hypertrophic disorders of the skin    Other melanin hyperpigmentation    Other seborrheic keratosis    Scar condition and fibrosis of skin    Telogen effluvium    Atypical mole        Objective   Well appearing patient in no apparent distress; mood and affect are within normal limits.    A focused skin examination was performed. All findings within normal limits unless otherwise noted below.    Assessment/Plan   1. Androgenetic alopecia  Mid Parietal Scalp  Mild thinning of hair with very few miniaturization of the hair follicles noted.     This is a 53 y.o. female  following up for AGA. Patient happy with improvement. Asking to increase spironolactone up. Patient denies lightheadedness, dizziness, breast tenderness, irregular spotting, HA. Will increase to 100 mg spironolactone daily. Coincidentally spironolactone has helped stabalize chronically lower K (2/2 to chronic pancreatitis per patient).  /78.       Related Procedures  Follow Up In Dermatology - Established Patient    Related Medications  spironolactone (Aldactone) 100 mg tablet  Take one pill by mouth daily

## 2023-11-22 DIAGNOSIS — J32.9 SINUSITIS, UNSPECIFIED CHRONICITY, UNSPECIFIED LOCATION: Primary | ICD-10-CM

## 2023-11-22 RX ORDER — AMOXICILLIN AND CLAVULANATE POTASSIUM 875; 125 MG/1; MG/1
875 TABLET, FILM COATED ORAL 2 TIMES DAILY
Qty: 20 TABLET | Refills: 0 | Status: SHIPPED | OUTPATIENT
Start: 2023-11-22 | End: 2023-12-02

## 2023-11-28 DIAGNOSIS — B37.9 CANDIDIASIS: Primary | ICD-10-CM

## 2023-11-28 RX ORDER — FLUCONAZOLE 150 MG/1
150 TABLET ORAL ONCE
Qty: 1 TABLET | Refills: 1 | Status: SHIPPED | OUTPATIENT
Start: 2023-11-28 | End: 2023-11-28

## 2023-12-07 DIAGNOSIS — E78.5 HYPERLIPIDEMIA, UNSPECIFIED HYPERLIPIDEMIA TYPE: Primary | ICD-10-CM

## 2023-12-08 ENCOUNTER — LAB (OUTPATIENT)
Dept: LAB | Facility: LAB | Age: 53
End: 2023-12-08
Payer: COMMERCIAL

## 2023-12-08 DIAGNOSIS — E78.5 HYPERLIPIDEMIA, UNSPECIFIED HYPERLIPIDEMIA TYPE: ICD-10-CM

## 2023-12-08 LAB
AMYLASE SERPL-CCNC: 62 U/L (ref 29–103)
CHOLEST SERPL-MCNC: 220 MG/DL (ref 0–199)
CHOLESTEROL/HDL RATIO: 3.6
HDLC SERPL-MCNC: 60.7 MG/DL
LDLC SERPL CALC-MCNC: 144 MG/DL
LIPASE SERPL-CCNC: 51 U/L (ref 9–82)
NON HDL CHOLESTEROL: 159 MG/DL (ref 0–149)
TRIGL SERPL-MCNC: 79 MG/DL (ref 0–149)
VLDL: 16 MG/DL (ref 0–40)

## 2023-12-08 PROCEDURE — 83690 ASSAY OF LIPASE: CPT

## 2023-12-08 PROCEDURE — 82150 ASSAY OF AMYLASE: CPT

## 2023-12-08 PROCEDURE — 80061 LIPID PANEL: CPT

## 2023-12-08 PROCEDURE — 36415 COLL VENOUS BLD VENIPUNCTURE: CPT

## 2023-12-13 ENCOUNTER — TELEPHONE (OUTPATIENT)
Dept: GASTROENTEROLOGY | Facility: CLINIC | Age: 53
End: 2023-12-13
Payer: COMMERCIAL

## 2023-12-13 ENCOUNTER — ANCILLARY PROCEDURE (OUTPATIENT)
Dept: RADIOLOGY | Facility: CLINIC | Age: 53
End: 2023-12-13
Payer: COMMERCIAL

## 2023-12-13 DIAGNOSIS — K59.00 CONSTIPATION, UNSPECIFIED CONSTIPATION TYPE: ICD-10-CM

## 2023-12-13 DIAGNOSIS — K59.00 CONSTIPATION, UNSPECIFIED CONSTIPATION TYPE: Primary | ICD-10-CM

## 2023-12-13 PROCEDURE — 74018 RADEX ABDOMEN 1 VIEW: CPT | Performed by: RADIOLOGY

## 2023-12-13 PROCEDURE — 74018 RADEX ABDOMEN 1 VIEW: CPT

## 2023-12-14 NOTE — TELEPHONE ENCOUNTER
Phone - no BM x 6 days - then took mag citrate, passed a lot of water, no solid stool - I rec: KUB to r/o fecal retention

## 2023-12-14 NOTE — RESULT ENCOUNTER NOTE
I called patient - KUB shows dilated loops of colon - feels less distended today, has more formed BM - I rec: appt.

## 2023-12-14 NOTE — TELEPHONE ENCOUNTER
----- Message from Laureen Nixon MA sent at 12/13/2023 11:20 AM EST -----  Please call this is Dr Yeung's assistant she is having GI issues again [phone] Also her son Pilo my cousin is coming to see you when you are at [facility] for his procedure he has to go to [facility] because of his BMI arjun SARAVIA

## 2023-12-15 ENCOUNTER — APPOINTMENT (OUTPATIENT)
Dept: RADIOLOGY | Facility: HOSPITAL | Age: 53
DRG: 389 | End: 2023-12-15
Payer: COMMERCIAL

## 2023-12-15 ENCOUNTER — HOSPITAL ENCOUNTER (INPATIENT)
Facility: HOSPITAL | Age: 53
LOS: 4 days | Discharge: HOME | DRG: 389 | End: 2023-12-19
Attending: EMERGENCY MEDICINE | Admitting: STUDENT IN AN ORGANIZED HEALTH CARE EDUCATION/TRAINING PROGRAM
Payer: COMMERCIAL

## 2023-12-15 DIAGNOSIS — N17.9 AKI (ACUTE KIDNEY INJURY) (CMS-HCC): ICD-10-CM

## 2023-12-15 DIAGNOSIS — K56.7 ILEUS (MULTI): Primary | ICD-10-CM

## 2023-12-15 DIAGNOSIS — K56.609 SBO (SMALL BOWEL OBSTRUCTION) (MULTI): ICD-10-CM

## 2023-12-15 DIAGNOSIS — E87.6 HYPOKALEMIA: ICD-10-CM

## 2023-12-15 LAB
ALBUMIN SERPL BCP-MCNC: 4.6 G/DL (ref 3.4–5)
ALP SERPL-CCNC: 58 U/L (ref 33–110)
ALT SERPL W P-5'-P-CCNC: 21 U/L (ref 7–45)
ANION GAP SERPL CALC-SCNC: 16 MMOL/L (ref 10–20)
APPEARANCE UR: ABNORMAL
AST SERPL W P-5'-P-CCNC: 19 U/L (ref 9–39)
BASOPHILS # BLD AUTO: 0.03 X10*3/UL (ref 0–0.1)
BASOPHILS NFR BLD AUTO: 0.4 %
BILIRUB SERPL-MCNC: 0.3 MG/DL (ref 0–1.2)
BILIRUB UR STRIP.AUTO-MCNC: NEGATIVE MG/DL
BUN SERPL-MCNC: 26 MG/DL (ref 6–23)
CALCIUM SERPL-MCNC: 9.6 MG/DL (ref 8.6–10.3)
CHLORIDE SERPL-SCNC: 101 MMOL/L (ref 98–107)
CO2 SERPL-SCNC: 26 MMOL/L (ref 21–32)
COLOR UR: YELLOW
CREAT SERPL-MCNC: 1.4 MG/DL (ref 0.5–1.05)
EOSINOPHIL # BLD AUTO: 0.06 X10*3/UL (ref 0–0.7)
EOSINOPHIL NFR BLD AUTO: 0.7 %
ERYTHROCYTE [DISTWIDTH] IN BLOOD BY AUTOMATED COUNT: 12.5 % (ref 11.5–14.5)
GFR SERPL CREATININE-BSD FRML MDRD: 45 ML/MIN/1.73M*2
GLUCOSE SERPL-MCNC: 89 MG/DL (ref 74–99)
GLUCOSE UR STRIP.AUTO-MCNC: NEGATIVE MG/DL
HCT VFR BLD AUTO: 34.5 % (ref 36–46)
HGB BLD-MCNC: 11.7 G/DL (ref 12–16)
HYALINE CASTS #/AREA URNS AUTO: ABNORMAL /LPF
IMM GRANULOCYTES # BLD AUTO: 0.02 X10*3/UL (ref 0–0.7)
IMM GRANULOCYTES NFR BLD AUTO: 0.2 % (ref 0–0.9)
KETONES UR STRIP.AUTO-MCNC: NEGATIVE MG/DL
LACTATE SERPL-SCNC: 1 MMOL/L (ref 0.4–2)
LEUKOCYTE ESTERASE UR QL STRIP.AUTO: ABNORMAL
LIPASE SERPL-CCNC: 56 U/L (ref 9–82)
LYMPHOCYTES # BLD AUTO: 2.8 X10*3/UL (ref 1.2–4.8)
LYMPHOCYTES NFR BLD AUTO: 33.2 %
MCH RBC QN AUTO: 31.2 PG (ref 26–34)
MCHC RBC AUTO-ENTMCNC: 33.9 G/DL (ref 32–36)
MCV RBC AUTO: 92 FL (ref 80–100)
MONOCYTES # BLD AUTO: 0.49 X10*3/UL (ref 0.1–1)
MONOCYTES NFR BLD AUTO: 5.8 %
MUCOUS THREADS #/AREA URNS AUTO: ABNORMAL /LPF
NEUTROPHILS # BLD AUTO: 5.04 X10*3/UL (ref 1.2–7.7)
NEUTROPHILS NFR BLD AUTO: 59.7 %
NITRITE UR QL STRIP.AUTO: NEGATIVE
NRBC BLD-RTO: 0 /100 WBCS (ref 0–0)
PH UR STRIP.AUTO: 5 [PH]
PLATELET # BLD AUTO: 380 X10*3/UL (ref 150–450)
POTASSIUM SERPL-SCNC: 3.1 MMOL/L (ref 3.5–5.3)
PROT SERPL-MCNC: 7.1 G/DL (ref 6.4–8.2)
PROT UR STRIP.AUTO-MCNC: NEGATIVE MG/DL
RBC # BLD AUTO: 3.75 X10*6/UL (ref 4–5.2)
RBC # UR STRIP.AUTO: NEGATIVE /UL
RBC #/AREA URNS AUTO: ABNORMAL /HPF
SODIUM SERPL-SCNC: 140 MMOL/L (ref 136–145)
SP GR UR STRIP.AUTO: 1.02
SQUAMOUS #/AREA URNS AUTO: ABNORMAL /HPF
UROBILINOGEN UR STRIP.AUTO-MCNC: <2 MG/DL
WBC # BLD AUTO: 8.4 X10*3/UL (ref 4.4–11.3)
WBC #/AREA URNS AUTO: ABNORMAL /HPF

## 2023-12-15 PROCEDURE — 0D9670Z DRAINAGE OF STOMACH WITH DRAINAGE DEVICE, VIA NATURAL OR ARTIFICIAL OPENING: ICD-10-PCS | Performed by: NURSE PRACTITIONER

## 2023-12-15 PROCEDURE — 80053 COMPREHEN METABOLIC PANEL: CPT | Performed by: NURSE PRACTITIONER

## 2023-12-15 PROCEDURE — 96375 TX/PRO/DX INJ NEW DRUG ADDON: CPT

## 2023-12-15 PROCEDURE — 85025 COMPLETE CBC W/AUTO DIFF WBC: CPT | Performed by: NURSE PRACTITIONER

## 2023-12-15 PROCEDURE — 74018 RADEX ABDOMEN 1 VIEW: CPT | Mod: FY

## 2023-12-15 PROCEDURE — 74018 RADEX ABDOMEN 1 VIEW: CPT | Mod: 77,FY

## 2023-12-15 PROCEDURE — 96376 TX/PRO/DX INJ SAME DRUG ADON: CPT

## 2023-12-15 PROCEDURE — 74177 CT ABD & PELVIS W/CONTRAST: CPT

## 2023-12-15 PROCEDURE — 2500000004 HC RX 250 GENERAL PHARMACY W/ HCPCS (ALT 636 FOR OP/ED): Performed by: NURSE PRACTITIONER

## 2023-12-15 PROCEDURE — 36415 COLL VENOUS BLD VENIPUNCTURE: CPT | Performed by: NURSE PRACTITIONER

## 2023-12-15 PROCEDURE — 1100000001 HC PRIVATE ROOM DAILY

## 2023-12-15 PROCEDURE — 83605 ASSAY OF LACTIC ACID: CPT | Performed by: NURSE PRACTITIONER

## 2023-12-15 PROCEDURE — 2500000001 HC RX 250 WO HCPCS SELF ADMINISTERED DRUGS (ALT 637 FOR MEDICARE OP): Performed by: NURSE PRACTITIONER

## 2023-12-15 PROCEDURE — 99285 EMERGENCY DEPT VISIT HI MDM: CPT | Mod: 25 | Performed by: EMERGENCY MEDICINE

## 2023-12-15 PROCEDURE — 81003 URINALYSIS AUTO W/O SCOPE: CPT | Performed by: NURSE PRACTITIONER

## 2023-12-15 PROCEDURE — 2550000001 HC RX 255 CONTRASTS: Performed by: EMERGENCY MEDICINE

## 2023-12-15 PROCEDURE — 83690 ASSAY OF LIPASE: CPT | Performed by: NURSE PRACTITIONER

## 2023-12-15 PROCEDURE — 99223 1ST HOSP IP/OBS HIGH 75: CPT

## 2023-12-15 PROCEDURE — 74018 RADEX ABDOMEN 1 VIEW: CPT | Mod: REPEAT PROCEDURE BY ANOTHER PHYSICIAN | Performed by: RADIOLOGY

## 2023-12-15 PROCEDURE — 74177 CT ABD & PELVIS W/CONTRAST: CPT | Performed by: RADIOLOGY

## 2023-12-15 PROCEDURE — 96374 THER/PROPH/DIAG INJ IV PUSH: CPT

## 2023-12-15 PROCEDURE — 2500000004 HC RX 250 GENERAL PHARMACY W/ HCPCS (ALT 636 FOR OP/ED)

## 2023-12-15 PROCEDURE — 74018 RADEX ABDOMEN 1 VIEW: CPT | Performed by: RADIOLOGY

## 2023-12-15 PROCEDURE — C9113 INJ PANTOPRAZOLE SODIUM, VIA: HCPCS | Performed by: NURSE PRACTITIONER

## 2023-12-15 RX ORDER — ALBUTEROL SULFATE 90 UG/1
2 AEROSOL, METERED RESPIRATORY (INHALATION) EVERY 4 HOURS
Status: DISCONTINUED | OUTPATIENT
Start: 2023-12-15 | End: 2023-12-19 | Stop reason: HOSPADM

## 2023-12-15 RX ORDER — PANTOPRAZOLE SODIUM 40 MG/10ML
40 INJECTION, POWDER, LYOPHILIZED, FOR SOLUTION INTRAVENOUS ONCE
Status: COMPLETED | OUTPATIENT
Start: 2023-12-15 | End: 2023-12-15

## 2023-12-15 RX ORDER — MORPHINE SULFATE 4 MG/ML
4 INJECTION INTRAVENOUS ONCE
Status: COMPLETED | OUTPATIENT
Start: 2023-12-15 | End: 2023-12-15

## 2023-12-15 RX ORDER — ONDANSETRON HYDROCHLORIDE 2 MG/ML
4 INJECTION, SOLUTION INTRAVENOUS EVERY 6 HOURS PRN
Status: DISCONTINUED | OUTPATIENT
Start: 2023-12-15 | End: 2023-12-19 | Stop reason: HOSPADM

## 2023-12-15 RX ORDER — BUPROPION HYDROCHLORIDE 150 MG/1
150 TABLET ORAL EVERY MORNING
Status: DISCONTINUED | OUTPATIENT
Start: 2023-12-16 | End: 2023-12-15

## 2023-12-15 RX ORDER — ATORVASTATIN CALCIUM 40 MG/1
40 TABLET, FILM COATED ORAL DAILY
Status: DISCONTINUED | OUTPATIENT
Start: 2023-12-15 | End: 2023-12-18

## 2023-12-15 RX ORDER — ONDANSETRON HYDROCHLORIDE 2 MG/ML
4 INJECTION, SOLUTION INTRAVENOUS ONCE
Status: COMPLETED | OUTPATIENT
Start: 2023-12-15 | End: 2023-12-15

## 2023-12-15 RX ORDER — POTASSIUM CHLORIDE 1.5 G/1.58G
20 POWDER, FOR SOLUTION ORAL ONCE
Status: COMPLETED | OUTPATIENT
Start: 2023-12-15 | End: 2023-12-15

## 2023-12-15 RX ORDER — SPIRONOLACTONE 100 MG/1
100 TABLET, FILM COATED ORAL DAILY
Status: DISCONTINUED | OUTPATIENT
Start: 2023-12-15 | End: 2023-12-19 | Stop reason: HOSPADM

## 2023-12-15 RX ORDER — BUPROPION HYDROCHLORIDE 150 MG/1
300 TABLET ORAL EVERY MORNING
Status: DISCONTINUED | OUTPATIENT
Start: 2023-12-16 | End: 2023-12-18

## 2023-12-15 RX ORDER — PANTOPRAZOLE SODIUM 40 MG/1
40 TABLET, DELAYED RELEASE ORAL DAILY
Status: DISCONTINUED | OUTPATIENT
Start: 2023-12-15 | End: 2023-12-19 | Stop reason: HOSPADM

## 2023-12-15 RX ORDER — POTASSIUM CHLORIDE 1.5 G/1.58G
20 POWDER, FOR SOLUTION ORAL 2 TIMES DAILY
Status: DISCONTINUED | OUTPATIENT
Start: 2023-12-15 | End: 2023-12-15

## 2023-12-15 RX ORDER — ENOXAPARIN SODIUM 100 MG/ML
40 INJECTION SUBCUTANEOUS EVERY 24 HOURS
Status: DISCONTINUED | OUTPATIENT
Start: 2023-12-15 | End: 2023-12-19 | Stop reason: HOSPADM

## 2023-12-15 RX ORDER — SODIUM CHLORIDE, SODIUM LACTATE, POTASSIUM CHLORIDE, CALCIUM CHLORIDE 600; 310; 30; 20 MG/100ML; MG/100ML; MG/100ML; MG/100ML
100 INJECTION, SOLUTION INTRAVENOUS CONTINUOUS
Status: DISCONTINUED | OUTPATIENT
Start: 2023-12-15 | End: 2023-12-17

## 2023-12-15 RX ORDER — GABAPENTIN 300 MG/1
300 CAPSULE ORAL 2 TIMES DAILY
Status: DISCONTINUED | OUTPATIENT
Start: 2023-12-15 | End: 2023-12-16

## 2023-12-15 RX ORDER — LEVOTHYROXINE SODIUM 100 UG/1
100 TABLET ORAL
Status: DISCONTINUED | OUTPATIENT
Start: 2023-12-16 | End: 2023-12-19 | Stop reason: HOSPADM

## 2023-12-15 RX ADMIN — POTASSIUM CHLORIDE 20 MEQ: 1.5 POWDER, FOR SOLUTION ORAL at 16:49

## 2023-12-15 RX ADMIN — SODIUM CHLORIDE, POTASSIUM CHLORIDE, SODIUM LACTATE AND CALCIUM CHLORIDE 80 ML/HR: 600; 310; 30; 20 INJECTION, SOLUTION INTRAVENOUS at 21:44

## 2023-12-15 RX ADMIN — SODIUM CHLORIDE 1000 ML: 9 INJECTION, SOLUTION INTRAVENOUS at 14:28

## 2023-12-15 RX ADMIN — PANTOPRAZOLE SODIUM 40 MG: 40 INJECTION, POWDER, FOR SOLUTION INTRAVENOUS at 14:22

## 2023-12-15 RX ADMIN — MORPHINE SULFATE 4 MG: 4 INJECTION INTRAVENOUS at 17:42

## 2023-12-15 RX ADMIN — IOHEXOL 75 ML: 350 INJECTION, SOLUTION INTRAVENOUS at 16:41

## 2023-12-15 RX ADMIN — ONDANSETRON 4 MG: 2 INJECTION INTRAMUSCULAR; INTRAVENOUS at 19:49

## 2023-12-15 RX ADMIN — SODIUM CHLORIDE 1000 ML: 9 INJECTION, SOLUTION INTRAVENOUS at 16:50

## 2023-12-15 RX ADMIN — MORPHINE SULFATE 4 MG: 4 INJECTION INTRAVENOUS at 14:20

## 2023-12-15 RX ADMIN — HYDROMORPHONE HYDROCHLORIDE 0.2 MG: 1 INJECTION, SOLUTION INTRAMUSCULAR; INTRAVENOUS; SUBCUTANEOUS at 19:49

## 2023-12-15 RX ADMIN — ONDANSETRON 4 MG: 2 INJECTION INTRAMUSCULAR; INTRAVENOUS at 14:18

## 2023-12-15 RX ADMIN — HYDROMORPHONE HYDROCHLORIDE 0.2 MG: 1 INJECTION, SOLUTION INTRAMUSCULAR; INTRAVENOUS; SUBCUTANEOUS at 23:54

## 2023-12-15 SDOH — ECONOMIC STABILITY: HOUSING INSECURITY: IN THE LAST 12 MONTHS, HOW MANY PLACES HAVE YOU LIVED?: 1

## 2023-12-15 SDOH — SOCIAL STABILITY: SOCIAL INSECURITY: ARE THERE ANY APPARENT SIGNS OF INJURIES/BEHAVIORS THAT COULD BE RELATED TO ABUSE/NEGLECT?: NO

## 2023-12-15 SDOH — ECONOMIC STABILITY: TRANSPORTATION INSECURITY

## 2023-12-15 SDOH — ECONOMIC STABILITY: GENERAL

## 2023-12-15 SDOH — SOCIAL STABILITY: SOCIAL INSECURITY: ABUSE: ADULT

## 2023-12-15 SDOH — SOCIAL STABILITY: SOCIAL INSECURITY: HAVE YOU HAD THOUGHTS OF HARMING ANYONE ELSE?: NO

## 2023-12-15 SDOH — ECONOMIC STABILITY: FOOD INSECURITY: WITHIN THE PAST 12 MONTHS, THE FOOD YOU BOUGHT JUST DIDN'T LAST AND YOU DIDN'T HAVE MONEY TO GET MORE.: NEVER TRUE

## 2023-12-15 SDOH — ECONOMIC STABILITY: FOOD INSECURITY: WITHIN THE PAST 12 MONTHS, YOU WORRIED THAT YOUR FOOD WOULD RUN OUT BEFORE YOU GOT MONEY TO BUY MORE.: NEVER TRUE

## 2023-12-15 SDOH — ECONOMIC STABILITY: HOUSING INSECURITY

## 2023-12-15 SDOH — ECONOMIC STABILITY: TRANSPORTATION INSECURITY
IN THE PAST 12 MONTHS, HAS THE LACK OF TRANSPORTATION KEPT YOU FROM MEDICAL APPOINTMENTS OR FROM GETTING MEDICATIONS?: NO

## 2023-12-15 SDOH — ECONOMIC STABILITY: HOUSING INSECURITY
IN THE LAST 12 MONTHS, WAS THERE A TIME WHEN YOU DID NOT HAVE A STEADY PLACE TO SLEEP OR SLEPT IN A SHELTER (INCLUDING NOW)?: NO

## 2023-12-15 SDOH — ECONOMIC STABILITY: INCOME INSECURITY: IN THE LAST 12 MONTHS, WAS THERE A TIME WHEN YOU WERE NOT ABLE TO PAY THE MORTGAGE OR RENT ON TIME?: NO

## 2023-12-15 SDOH — SOCIAL STABILITY: SOCIAL INSECURITY: DO YOU FEEL ANYONE HAS EXPLOITED OR TAKEN ADVANTAGE OF YOU FINANCIALLY OR OF YOUR PERSONAL PROPERTY?: NO

## 2023-12-15 SDOH — SOCIAL STABILITY: SOCIAL INSECURITY: HAS ANYONE EVER THREATENED TO HURT YOUR FAMILY OR YOUR PETS?: NO

## 2023-12-15 SDOH — ECONOMIC STABILITY: HOUSING INSECURITY: IN THE PAST 12 MONTHS HAS THE ELECTRIC, GAS, OIL, OR WATER COMPANY THREATENED TO SHUT OFF SERVICES IN YOUR HOME?: NO

## 2023-12-15 SDOH — SOCIAL STABILITY: SOCIAL INSECURITY: DOES ANYONE TRY TO KEEP YOU FROM HAVING/CONTACTING OTHER FRIENDS OR DOING THINGS OUTSIDE YOUR HOME?: NO

## 2023-12-15 SDOH — ECONOMIC STABILITY: HOUSING INSECURITY: IN THE LAST 12 MONTHS, WAS THERE A TIME WHEN YOU WERE NOT ABLE TO PAY THE MORTGAGE OR RENT ON TIME?: NO

## 2023-12-15 SDOH — SOCIAL STABILITY: SOCIAL INSECURITY: WERE YOU ABLE TO COMPLETE ALL THE BEHAVIORAL HEALTH SCREENINGS?: YES

## 2023-12-15 SDOH — ECONOMIC STABILITY: TRANSPORTATION INSECURITY: IN THE PAST 12 MONTHS, HAS LACK OF TRANSPORTATION KEPT YOU FROM MEDICAL APPOINTMENTS OR FROM GETTING MEDICATIONS?: NO

## 2023-12-15 SDOH — ECONOMIC STABILITY: FOOD INSECURITY: WITHIN THE PAST 12 MONTHS, THE FOOD YOU BOUGHT JUST DIDN’T LAST AND YOU DIDN’T HAVE MONEY TO GET MORE.: NEVER TRUE

## 2023-12-15 SDOH — SOCIAL STABILITY: SOCIAL INSECURITY: ARE YOU OR HAVE YOU BEEN THREATENED OR ABUSED PHYSICALLY, EMOTIONALLY, OR SEXUALLY BY ANYONE?: NO

## 2023-12-15 SDOH — SOCIAL STABILITY: SOCIAL INSECURITY: DO YOU FEEL UNSAFE GOING BACK TO THE PLACE WHERE YOU ARE LIVING?: NO

## 2023-12-15 SDOH — ECONOMIC STABILITY: TRANSPORTATION INSECURITY
IN THE PAST 12 MONTHS, HAS LACK OF TRANSPORTATION KEPT YOU FROM MEETINGS, WORK, OR FROM GETTING THINGS NEEDED FOR DAILY LIVING?: NO

## 2023-12-15 SDOH — ECONOMIC STABILITY: FOOD INSECURITY: WITHIN THE PAST 12 MONTHS, YOU WORRIED THAT YOUR FOOD WOULD RUN OUT BEFORE YOU GOT THE MONEY TO BUY MORE.: NEVER TRUE

## 2023-12-15 SDOH — ECONOMIC STABILITY: FOOD INSECURITY

## 2023-12-15 ASSESSMENT — SOCIAL DETERMINANTS OF HEALTH (SDOH): IN THE PAST 12 MONTHS, HAS THE ELECTRIC, GAS, OIL, OR WATER COMPANY THREATENED TO SHUT OFF SERVICE IN YOUR HOME?: NO

## 2023-12-15 ASSESSMENT — PAIN - FUNCTIONAL ASSESSMENT
PAIN_FUNCTIONAL_ASSESSMENT: 0-10

## 2023-12-15 ASSESSMENT — COGNITIVE AND FUNCTIONAL STATUS - GENERAL
DAILY ACTIVITIY SCORE: 24
PATIENT BASELINE BEDBOUND: NO
MOBILITY SCORE: 24
DAILY ACTIVITIY SCORE: 24
MOBILITY SCORE: 24

## 2023-12-15 ASSESSMENT — ACTIVITIES OF DAILY LIVING (ADL)
DRESSING YOURSELF: INDEPENDENT
TOILETING: INDEPENDENT
HOW_WELL_CAN_YOU_BATHE_YOURSELF: INDEPENDENTLY
HEARING - RIGHT EAR: FUNCTIONAL
HEARING_RIGHT_EAR: NO PROBLEMS
BATHING: INDEPENDENT
ADEQUATE_TO_COMPLETE_ADL: YES
DRESSING: INDEPENDENT
HOW_WELL_CAN_YOU_DRESS_YOURSELF: INDEPENDENTLY
GROOMING: INDEPENDENT
FEEDING: INDEPENDENT
BATHING: INDEPENDENT
PATIENT'S MEMORY ADEQUATE TO SAFELY COMPLETE DAILY ACTIVITIES?: YES
JUDGMENT_ADEQUATE_SAFELY_COMPLETE_DAILY_ACTIVITIES: YES
HOW_WELL_CAN_YOU_COMPLETE_GROOMING_TASKS: INDEPENDENTLY
ADL_BEFORE_ADMISSION: INDEPENDENTLY
TOILETING: INDEPENDENT
HEARING_LEFT_EAR: NO PROBLEMS
LACK_OF_TRANSPORTATION: NO
WALKS IN HOME: INDEPENDENT
LACK_OF_TRANSPORTATION: NO
WALKS_IN_HOME: INDEPENDENTLY
ADEQUATE_TO_COMPLETE_ADL: YES
FEEDING YOURSELF: INDEPENDENT
ADEQUATE_TO_COMPLETE_ADL: YES
HEARING - LEFT EAR: FUNCTIONAL
HOW_WELL_CAN_YOU_FEED_YOURSELF: INDEPENDENTLY
ADL_BEFORE_ADMISSION: LEFT
HOW_WELL_CAN_YOU_USE_BATHROOM_BY_YOURSELF: INDEPENDENTLY

## 2023-12-15 ASSESSMENT — PAIN DESCRIPTION - LOCATION
LOCATION: ABDOMEN

## 2023-12-15 ASSESSMENT — LIFESTYLE VARIABLES
REASON UNABLE TO ASSESS: NO
EVER FELT BAD OR GUILTY ABOUT YOUR DRINKING: NO
HOW OFTEN DO YOU HAVE A DRINK CONTAINING ALCOHOL: NEVER
HOW OFTEN DO YOU HAVE 6 OR MORE DRINKS ON ONE OCCASION: NEVER
HAVE YOU EVER FELT YOU SHOULD CUT DOWN ON YOUR DRINKING: NO
AUDIT-C TOTAL SCORE: 0
PRESCIPTION_ABUSE_PAST_12_MONTHS: NO
SKIP TO QUESTIONS 9-10: 1
SUBSTANCE_ABUSE_PAST_12_MONTHS: NO
HOW MANY STANDARD DRINKS CONTAINING ALCOHOL DO YOU HAVE ON A TYPICAL DAY: PATIENT DOES NOT DRINK
HAVE PEOPLE ANNOYED YOU BY CRITICIZING YOUR DRINKING: NO
EVER HAD A DRINK FIRST THING IN THE MORNING TO STEADY YOUR NERVES TO GET RID OF A HANGOVER: NO
AUDIT-C TOTAL SCORE: 0

## 2023-12-15 ASSESSMENT — PAIN SCALES - GENERAL
PAINLEVEL_OUTOF10: 8
PAINLEVEL_OUTOF10: 7
PAINLEVEL_OUTOF10: 8
PAINLEVEL_OUTOF10: 7
PAINLEVEL_OUTOF10: 0 - NO PAIN
PAINLEVEL_OUTOF10: 10 - WORST POSSIBLE PAIN
PAINLEVEL_OUTOF10: 6
PAINLEVEL_OUTOF10: 6
PAINLEVEL_OUTOF10: 4
PAINLEVEL_OUTOF10: 10 - WORST POSSIBLE PAIN

## 2023-12-15 ASSESSMENT — PAIN DESCRIPTION - PAIN TYPE: TYPE: ACUTE PAIN

## 2023-12-15 ASSESSMENT — PATIENT HEALTH QUESTIONNAIRE - PHQ9
1. LITTLE INTEREST OR PLEASURE IN DOING THINGS: NOT AT ALL
SUM OF ALL RESPONSES TO PHQ9 QUESTIONS 1 & 2: 0
2. FEELING DOWN, DEPRESSED OR HOPELESS: NOT AT ALL

## 2023-12-15 ASSESSMENT — COLUMBIA-SUICIDE SEVERITY RATING SCALE - C-SSRS
6. HAVE YOU EVER DONE ANYTHING, STARTED TO DO ANYTHING, OR PREPARED TO DO ANYTHING TO END YOUR LIFE?: NO
1. IN THE PAST MONTH, HAVE YOU WISHED YOU WERE DEAD OR WISHED YOU COULD GO TO SLEEP AND NOT WAKE UP?: NO
2. HAVE YOU ACTUALLY HAD ANY THOUGHTS OF KILLING YOURSELF?: NO

## 2023-12-15 NOTE — PROGRESS NOTES
Pharmacy Medication History Review    Paulie Byrd is a 53 y.o. female admitted for Ileus (CMS/[facility]). Pharmacy reviewed the patient's [id number] medications and allergies for accuracy.    The list below reflectives the updated PTA list. Please review each medication in order reconciliation for additional clarification and justification.  (Not in a hospital admission)       The list below reflectives the updated allergy list. Please review each documented allergy for additional clarification and justification.  Allergies  Reviewed by Zain Ho, EMT on 12/15/2023        Severity Reactions Comments    Codeine Not Specified Other     Grass Pollen Not Specified Unknown             Below are additional concerns with the patient's PTA list.      May Strong CPhT

## 2023-12-15 NOTE — H&P
History Of Present Illness  Paulie Byrd is a 53 y.o. female presenting with with past medical history of Chronic Pancreatitis, Hypothyroidism, Gastroparesis, Alopecia present to the e.d complaining of worsening abdominal pain. Patient states that pain started on Monday and has progressively gotten worse. Pain is located at right upper quadrant, non radiating and rate it 10/10. Today she took a lot of tylenol and motrin but did not notice any improvement. She states that she has a bowel movement this morning, it was liquid in nature. Patient does not remember if she has pass gas yet. She denies fever, feeling, nausea, vomiting, chest pain, leg pain or swelling       ED Course:  Vitals: Temp: 36.8 BP: 128/79 HR: 90 Spo2: 100  CBC: 8.4/11.7/380  CMP: Potassium: 3.1 Creatine: 1.40 and Bun:26  Urine Analysis: Small Leukocyte Esterase  Imaging Results  Abdominal Xray: Allowing for the different modality, today's KUB is quite reminiscent of the CT from 21 June 2023, with large amounts of gas in dilated  segments of colon   Ct of the Abdomen/Pelvis: Multiple fluid-filled and dilated colonic loops, due to acute colitis, ileus or developing rectal obstruction.   2. No other acute abnormality in the abdomen and pelvis. 3. Normal appendix     Intervention: Nacl 1000ml X2, Potassium 20, Morphine and NG tube placement      Past Medical History  She has a past medical history of Bilateral primary osteoarthritis of knee (01/19/2016), Chronic pain syndrome (06/06/2018), Chronic pancreatitis (CMS/HCC), Contusion of lower back and pelvis, subsequent encounter (09/07/2016), Encounter for other preprocedural examination (12/02/2016), Encounter for other preprocedural examination (01/19/2016), Graves disease, Other abnormality of red blood cells (06/23/2015), Other tear of medial meniscus, current injury, right knee, subsequent encounter (01/19/2016), Pain in right knee (01/19/2016), Personal history of other diseases of the  "musculoskeletal system and connective tissue (11/11/2015), Personal history of other diseases of the musculoskeletal system and connective tissue (12/29/2014), Personal history of other endocrine, nutritional and metabolic disease (06/06/2018), Personal history of other specified conditions (12/11/2014), Personal history of other specified conditions (04/22/2015), Unilateral primary osteoarthritis, left knee (12/02/2016), and Unilateral primary osteoarthritis, right knee (03/04/2016).    Surgical History  She has a past surgical history that includes Other surgical history (06/27/2014); Cholecystectomy (06/27/2014); Hysterectomy (06/27/2014); Adenoidectomy (06/27/2014); Other surgical history (09/23/2021); and Other surgical history (09/23/2021).     Social History  She reports that she has quit smoking. She has never used smokeless tobacco. She reports that she does not currently use alcohol. She reports that she does not currently use drugs after having used the following drugs: \"Crack\" cocaine.    Family History  Family History   Problem Relation Name Age of Onset    Allergies Other          Allergies  Codeine and Grass pollen    Review of Systems     Physical Exam  HENT:      Nose: Nose normal.   Eyes:      Pupils: Pupils are equal, round, and reactive to light.   Cardiovascular:      Rate and Rhythm: Normal rate and regular rhythm.      Pulses: Normal pulses.      Heart sounds: Normal heart sounds.   Pulmonary:      Breath sounds: Normal breath sounds.   Abdominal:      General: Abdomen is flat.      Palpations: Abdomen is soft.      Tenderness: There is abdominal tenderness.   Skin:     General: Skin is warm.      Capillary Refill: Capillary refill takes less than 2 seconds.   Neurological:      General: No focal deficit present.      Mental Status: She is alert.   Psychiatric:         Mood and Affect: Mood normal.          Last Recorded Vitals  /74   Pulse 90   Temp 36.8 °C (98.2 °F) (Tympanic)   " Resp 18   Wt 60.8 kg (134 lb)   SpO2 98%     Relevant Results        Results for orders placed or performed during the hospital encounter of 12/15/23 (from the past 24 hour(s))   Lipase   Result Value Ref Range    Lipase 56 9 - 82 U/L   Lactate   Result Value Ref Range    Lactate 1.0 0.4 - 2.0 mmol/L   Comprehensive Metabolic Panel   Result Value Ref Range    Glucose 89 74 - 99 mg/dL    Sodium 140 136 - 145 mmol/L    Potassium 3.1 (L) 3.5 - 5.3 mmol/L    Chloride 101 98 - 107 mmol/L    Bicarbonate 26 21 - 32 mmol/L    Anion Gap 16 10 - 20 mmol/L    Urea Nitrogen 26 (H) 6 - 23 mg/dL    Creatinine 1.40 (H) 0.50 - 1.05 mg/dL    eGFR 45 (L) >60 mL/min/1.73m*2    Calcium 9.6 8.6 - 10.3 mg/dL    Albumin 4.6 3.4 - 5.0 g/dL    Alkaline Phosphatase 58 33 - 110 U/L    Total Protein 7.1 6.4 - 8.2 g/dL    AST 19 9 - 39 U/L    Bilirubin, Total 0.3 0.0 - 1.2 mg/dL    ALT 21 7 - 45 U/L   CBC and Auto Differential   Result Value Ref Range    WBC 8.4 4.4 - 11.3 x10*3/uL    nRBC 0.0 0.0 - 0.0 /100 WBCs    RBC 3.75 (L) 4.00 - 5.20 x10*6/uL    Hemoglobin 11.7 (L) 12.0 - 16.0 g/dL    Hematocrit 34.5 (L) 36.0 - 46.0 %    MCV 92 80 - 100 fL    MCH 31.2 26.0 - 34.0 pg    MCHC 33.9 32.0 - 36.0 g/dL    RDW 12.5 11.5 - 14.5 %    Platelets 380 150 - 450 x10*3/uL    Neutrophils % 59.7 40.0 - 80.0 %    Immature Granulocytes %, Automated 0.2 0.0 - 0.9 %    Lymphocytes % 33.2 13.0 - 44.0 %    Monocytes % 5.8 2.0 - 10.0 %    Eosinophils % 0.7 0.0 - 6.0 %    Basophils % 0.4 0.0 - 2.0 %    Neutrophils Absolute 5.04 1.20 - 7.70 x10*3/uL    Immature Granulocytes Absolute, Automated 0.02 0.00 - 0.70 x10*3/uL    Lymphocytes Absolute 2.80 1.20 - 4.80 x10*3/uL    Monocytes Absolute 0.49 0.10 - 1.00 x10*3/uL    Eosinophils Absolute 0.06 0.00 - 0.70 x10*3/uL    Basophils Absolute 0.03 0.00 - 0.10 x10*3/uL   Urinalysis with Reflex Microscopic   Result Value Ref Range    Color, Urine Yellow Straw, Yellow    Appearance, Urine Hazy (N) Clear    Specific  Gravity, Urine 1.017 1.005 - 1.035    pH, Urine 5.0 5.0, 5.5, 6.0, 6.5, 7.0, 7.5, 8.0    Protein, Urine NEGATIVE NEGATIVE mg/dL    Glucose, Urine NEGATIVE NEGATIVE mg/dL    Blood, Urine NEGATIVE NEGATIVE    Ketones, Urine NEGATIVE NEGATIVE mg/dL    Bilirubin, Urine NEGATIVE NEGATIVE    Urobilinogen, Urine <2.0 <2.0 mg/dL    Nitrite, Urine NEGATIVE NEGATIVE    Leukocyte Esterase, Urine SMALL (1+) (A) NEGATIVE   Microscopic Only, Urine   Result Value Ref Range    WBC, Urine 6-10 (A) 1-5, NONE /HPF    RBC, Urine 1-2 NONE, 1-2, 3-5 /HPF    Squamous Epithelial Cells, Urine 1-9 (SPARSE) Reference range not established. /HPF    Mucus, Urine FEW Reference range not established. /LPF    Hyaline Casts, Urine 4+ (A) NONE /LPF     CT abdomen pelvis w IV contrast    Result Date: 12/15/2023  Interpreted By:  Delvis Fitzpatrick, STUDY: CT ABDOMEN PELVIS W IV CONTRAST;  12/15/2023 4:40 pm   INDICATION: Signs/Symptoms:concern for obstruction with abnormal kub two days ago and abdominal pain and n/v.   COMPARISON: 06/21/2023   ACCESSION NUMBER(S): [id number]   ORDERING CLINICIAN: PEPPER WILDE   TECHNIQUE: CT of the abdomen and pelvis was performed.  Standard contiguous axial images were obtained at 3 mm slice thickness through the abdomen and pelvis. Coronal and sagittal reconstructions at 3 mm slice thickness were performed.   75 ml of contrast Omnipaque 350 were administered intravenously without immediate complication.   FINDINGS: Lung bases clear.   Unremarkable liver, spleen, pancreas, and adrenals. Small cortical cysts in the bilateral kidneys redemonstrated, measuring up to 1.1 cm.   Status post cholecystectomy with associated stable mild CBD dilatation..   Normal appendix.   Bowel loops nonobstructed. Multiple fluid-filled and dilated colonic loops are noted, due to acute colitis, ileus, or developing rectal obstruction.   No free air or free fluid.   No abdominal aortic aneurysm. Mild vascular calcification.   No adenopathy.    Sections through the pelvis demonstrate a grossly unremarkable urinary bladder. Status post hysterectomy. No adnexal mass.   No acute osseous abnormality in the regional skeleton. Mild multilevel lumbar spondylosis. Mild diffuse osteopenia       1. Multiple fluid-filled and dilated colonic loops, due to acute colitis, ileus or developing rectal obstruction. 2. No other acute abnormality in the abdomen and pelvis. 3. Normal appendix   Signed by: Delvis Fitzpatrick 12/15/2023 5:39 PM Dictation workstation:   [id number]        Assessment/Plan   Paulie Byrd is a 53 y.o. female presenting with with past medical history of Chronic Pancreatitis, Hypothyroidism, Gastroparesis, Alopecia present to the e.d complaining of worsening abdominal pain.     Acute Medical Issues  #SBO  #Abdominal Pain  Imaging findings: Multiple fluid-filled and dilated colonic loops, due to acute colitis, ileus or developing rectal obstruction  Plan  - Pain regimen in place  - Antiemetic in place  - Surgery consulted pending rec    #Acute Kidney Injury   - Pre renal in nature  - On Maintenance Fluid LR 80ml/hr    #Hypokalemia  - History of hypokalemia  - Monitor and replete    Chronic Medical issues  #Restless Leg Syndrome: Gabapentin reduce to 300 twice daily in the setting of COTY  #Alopecia: continue Spironolactone  #Hypothyroidism: Continue Levothyroxine  #HLD: continue atorvastatin    Access: piv  Diet: npo  GI prophylaxis: PPI  DVT Prophylaxis: Lovenox  Antibiotics: NONE  Code: Full   Disposition: Patient admitted for SBO, Pending surgery rec           Elroy Barba MD

## 2023-12-15 NOTE — PROGRESS NOTES
The patient was seen by the midlevel/resident.  I have personally performed a substantive portion of the encounter.  I reviewed the EKG's (when done) and agree with the interpretation.  I have seen and examined the patient; agree with the workup, evaluation, MDM, management and diagnosis.  The care plan has been discussed with the midlevel/resident; I have reviewed the note and agree with the documented findings.     Worked up in the emergency room for abdominal discomfort and distended abdomen.  Patient is had constipation for a while tried MiraLAX and mag citrate and other laxatives without much improvement.  She had an outpatient x-ray which showed some air.  Clinically she has high-pitched bowel sounds on my exam different than the nurse practitioners exam.  She is tender diffusely I suspect she has a small bowel obstruction.  Will do a CT scan and reevaluate.  Given additional pain medication. CT showed SBO. Talked to surgery and will hospitalize.     Diagnoses as of 12/16/23 1059   Ileus (CMS/[facility])   SBO (small bowel obstruction) (CMS/[facility])   Hypokalemia   COTY (acute kidney injury) (CMS/[facility])     Richard Berrios MD

## 2023-12-15 NOTE — ED PROVIDER NOTES
HPI   Chief Complaint   Patient presents with    Constipation       53-year-old female presents today with abdominal pain, diarrhea, nausea and vomiting.  A KUB was completed 2 days ago and there was concern for obstruction.  She has no history of an obstruction.  The only abdominal surgery she had was a cholecystectomy when she was 20.  She denies vaginal bleeding.  She denies vaginal discharge or concern for STD.  She denies constipation but endorses clear liquid diarrhea.  She endorses cramping.  The KUB showed air-fluid levels.  Her primary care physician is concerned about obstruction.  She denies any recent trauma or fall.  She denies headache.  She denies chest pain.  She denies dyspnea.      History provided by:  Patient   used: No                        [place] Coma Scale Score: 15                  Patient History   Past Medical History:   Diagnosis Date    Bilateral primary osteoarthritis of knee 01/19/2016    Degenerative arthritis of knee, bilateral    Chronic pain syndrome 06/06/2018    Pain syndrome, chronic    Chronic pancreatitis (CMS/[facility])     Contusion of lower back and pelvis, subsequent encounter 09/07/2016    Lumbar contusion, subsequent encounter    Encounter for other preprocedural examination 12/02/2016    Pre-op exam    Encounter for other preprocedural examination 01/19/2016    Preoperative evaluation to rule out surgical contraindication    Graves disease     Other abnormality of red blood cells 06/23/2015    MCV - raised    Other tear of medial meniscus, current injury, right knee, subsequent encounter 01/19/2016    Acute medial meniscal tear, right, subsequent encounter    Pain in right knee 01/19/2016    Bilateral knee pain    Personal history of other diseases of the musculoskeletal system and connective tissue 11/11/2015    History of muscle pain    Personal history of other diseases of the musculoskeletal system and connective tissue 12/29/2014    History of low  "back pain    Personal history of other endocrine, nutritional and metabolic disease 06/06/2018    History of hyperglycemia    Personal history of other specified conditions 12/11/2014    History of lump of right breast    Personal history of other specified conditions 04/22/2015    History of fatigue    Unilateral primary osteoarthritis, left knee 12/02/2016    Degenerative joint disease of knee, left    Unilateral primary osteoarthritis, right knee 03/04/2016    Right knee DJD     Past Surgical History:   Procedure Laterality Date    ADENOIDECTOMY  06/27/2014    Adenoidectomy    CHOLECYSTECTOMY  06/27/2014    Cholecystectomy    HYSTERECTOMY  06/27/2014    Hysterectomy    OTHER SURGICAL HISTORY  06/27/2014    Abdominal Surgery    OTHER SURGICAL HISTORY  09/23/2021    Knee replacement    OTHER SURGICAL HISTORY  09/23/2021    Cyst excision     Family History   Problem Relation Name Age of Onset    Allergies Other       Social History     Tobacco Use    Smoking status: Former    Smokeless tobacco: Never   Vaping Use    Vaping Use: Every day    Substances: Nicotine   Substance Use Topics    Alcohol use: Not Currently     Comment: sober since 2006    Drug use: Not Currently     Types: \"Crack\" cocaine       Physical Exam   ED Triage Vitals [12/15/23 1151]   Temp Heart Rate Resp BP   36.8 °C (98.2 °F) 90 18 128/79      SpO2 Temp Source Heart Rate Source Patient Position   100 % Tympanic -- Sitting      BP Location FiO2 (%)     Left arm --       Physical Exam  Constitutional:       Appearance: Normal appearance.   HENT:      Head: Normocephalic and atraumatic.      Right Ear: Tympanic membrane normal.      Left Ear: Tympanic membrane normal.      Nose: Nose normal.      Mouth/Throat:      Mouth: Mucous membranes are moist.   Eyes:      Pupils: Pupils are equal, round, and reactive to light.   Cardiovascular:      Rate and Rhythm: Normal rate and regular rhythm.      Pulses: Normal pulses.      Heart sounds: Normal heart " sounds.   Pulmonary:      Effort: Pulmonary effort is normal.      Breath sounds: Normal breath sounds.   Abdominal:      General: Abdomen is flat.      Palpations: Abdomen is soft.      Tenderness: There is abdominal tenderness. There is rebound.   Musculoskeletal:         General: Normal range of motion.      Cervical back: Normal range of motion and neck supple.   Skin:     General: Skin is warm and dry.      Capillary Refill: Capillary refill takes less than 2 seconds.   Neurological:      General: No focal deficit present.      Mental Status: She is alert and oriented to person, place, and time.   Psychiatric:         Mood and Affect: Mood normal.         Behavior: Behavior normal.         ED Course & MDM   Diagnoses as of 12/15/23 1809   Ileus (CMS/[facility])   SBO (small bowel obstruction) (CMS/[facility])   Hypokalemia   COTY (acute kidney injury) (CMS/[facility])       Medical Decision Making  Patient received 4 mg morphine, 4 mg Zofran, 1 L normal saline and she was made NPO.  She was staffed with attending and there is concern for small bowel obstruction.  Patient's abdomen was distended and tender to the touch and I was concerned about an ileus or small bowel obstruction.  She has a long history of complications from abdominal pain and gets an annual colonoscopy and endoscopy from a private practice GI in [place].  She has moved to Dr. Villareal at [facility].  The last time she had a colonoscopy and an endoscopy was February 2023.  With her distended abdomen and a CT abdomen pelvis was ordered and it resulted with concern for ileus or developing small bowel obstruction.  I contacted Dr. Hwang on-call general surgery and she accepted consult.  Patient had an NG tube placed.  Patient has a prior history of a cholecystectomy and a hysterectomy.  She continued to have pain and she received 4 mg morphine x 2.  She received 2 L of normal saline and she was made NPO and I requested that nursing apply an NG tube with suction.   Full report to hospital service and admitted to hospital service.  Patient was seen and staffed with attending.  For patient's acute kidney injury which showed a bump in her creatinine and a decrease in her GFR fluids were administered.        Procedure  Procedures     Miguelito Hill, NIRMALA-CARLOS ENRIQUE  12/15/23 1622       Miguelito Hill, NIRMALA-CARLOS ENRIQUE  12/15/23 1802

## 2023-12-16 LAB
ALBUMIN SERPL BCP-MCNC: 3.5 G/DL (ref 3.4–5)
ANION GAP SERPL CALC-SCNC: 12 MMOL/L (ref 10–20)
BUN SERPL-MCNC: 15 MG/DL (ref 6–23)
CALCIUM SERPL-MCNC: 8.1 MG/DL (ref 8.6–10.3)
CHLORIDE SERPL-SCNC: 109 MMOL/L (ref 98–107)
CO2 SERPL-SCNC: 23 MMOL/L (ref 21–32)
CREAT SERPL-MCNC: 0.92 MG/DL (ref 0.5–1.05)
ERYTHROCYTE [DISTWIDTH] IN BLOOD BY AUTOMATED COUNT: 12.7 % (ref 11.5–14.5)
GFR SERPL CREATININE-BSD FRML MDRD: 74 ML/MIN/1.73M*2
GLUCOSE SERPL-MCNC: 76 MG/DL (ref 74–99)
HCT VFR BLD AUTO: 30.7 % (ref 36–46)
HGB BLD-MCNC: 10.1 G/DL (ref 12–16)
MAGNESIUM SERPL-MCNC: 1.75 MG/DL (ref 1.6–2.4)
MCH RBC QN AUTO: 31.6 PG (ref 26–34)
MCHC RBC AUTO-ENTMCNC: 32.9 G/DL (ref 32–36)
MCV RBC AUTO: 96 FL (ref 80–100)
NRBC BLD-RTO: 0 /100 WBCS (ref 0–0)
PHOSPHATE SERPL-MCNC: 3 MG/DL (ref 2.5–4.9)
PLATELET # BLD AUTO: 298 X10*3/UL (ref 150–450)
POTASSIUM SERPL-SCNC: 4 MMOL/L (ref 3.5–5.3)
RBC # BLD AUTO: 3.2 X10*6/UL (ref 4–5.2)
SODIUM SERPL-SCNC: 140 MMOL/L (ref 136–145)
WBC # BLD AUTO: 6.5 X10*3/UL (ref 4.4–11.3)

## 2023-12-16 PROCEDURE — 2500000001 HC RX 250 WO HCPCS SELF ADMINISTERED DRUGS (ALT 637 FOR MEDICARE OP)

## 2023-12-16 PROCEDURE — 2500000004 HC RX 250 GENERAL PHARMACY W/ HCPCS (ALT 636 FOR OP/ED)

## 2023-12-16 PROCEDURE — 94760 N-INVAS EAR/PLS OXIMETRY 1: CPT

## 2023-12-16 PROCEDURE — 96372 THER/PROPH/DIAG INJ SC/IM: CPT

## 2023-12-16 PROCEDURE — 80069 RENAL FUNCTION PANEL: CPT

## 2023-12-16 PROCEDURE — 83735 ASSAY OF MAGNESIUM: CPT

## 2023-12-16 PROCEDURE — 36415 COLL VENOUS BLD VENIPUNCTURE: CPT

## 2023-12-16 PROCEDURE — 2500000004 HC RX 250 GENERAL PHARMACY W/ HCPCS (ALT 636 FOR OP/ED): Performed by: INTERNAL MEDICINE

## 2023-12-16 PROCEDURE — 99222 1ST HOSP IP/OBS MODERATE 55: CPT | Performed by: SURGERY

## 2023-12-16 PROCEDURE — 85027 COMPLETE CBC AUTOMATED: CPT

## 2023-12-16 PROCEDURE — 1100000001 HC PRIVATE ROOM DAILY

## 2023-12-16 RX ORDER — DEXAMETHASONE SODIUM PHOSPHATE 4 MG/ML
4 INJECTION, SOLUTION INTRA-ARTICULAR; INTRALESIONAL; INTRAMUSCULAR; INTRAVENOUS; SOFT TISSUE ONCE
Status: COMPLETED | OUTPATIENT
Start: 2023-12-16 | End: 2023-12-16

## 2023-12-16 RX ORDER — BISACODYL 10 MG/1
10 SUPPOSITORY RECTAL DAILY
Status: DISCONTINUED | OUTPATIENT
Start: 2023-12-16 | End: 2023-12-19 | Stop reason: HOSPADM

## 2023-12-16 RX ORDER — GABAPENTIN 400 MG/1
1200 CAPSULE ORAL NIGHTLY
Status: DISCONTINUED | OUTPATIENT
Start: 2023-12-16 | End: 2023-12-19 | Stop reason: HOSPADM

## 2023-12-16 RX ORDER — MAGNESIUM SULFATE 1 G/100ML
1 INJECTION INTRAVENOUS ONCE
Status: COMPLETED | OUTPATIENT
Start: 2023-12-16 | End: 2023-12-16

## 2023-12-16 RX ADMIN — GABAPENTIN 300 MG: 300 CAPSULE ORAL at 11:05

## 2023-12-16 RX ADMIN — SODIUM CHLORIDE, POTASSIUM CHLORIDE, SODIUM LACTATE AND CALCIUM CHLORIDE 100 ML/HR: 600; 310; 30; 20 INJECTION, SOLUTION INTRAVENOUS at 20:45

## 2023-12-16 RX ADMIN — DEXAMETHASONE SODIUM PHOSPHATE 4 MG: 4 INJECTION INTRA-ARTICULAR; INTRALESIONAL; INTRAMUSCULAR; INTRAVENOUS; SOFT TISSUE at 08:43

## 2023-12-16 RX ADMIN — LEVOTHYROXINE SODIUM 100 MCG: 100 TABLET ORAL at 11:06

## 2023-12-16 RX ADMIN — MAGNESIUM SULFATE IN DEXTROSE 1 G: 10 INJECTION, SOLUTION INTRAVENOUS at 11:02

## 2023-12-16 RX ADMIN — PANTOPRAZOLE SODIUM 40 MG: 40 TABLET, DELAYED RELEASE ORAL at 11:05

## 2023-12-16 RX ADMIN — ONDANSETRON 4 MG: 2 INJECTION INTRAMUSCULAR; INTRAVENOUS at 08:43

## 2023-12-16 RX ADMIN — GABAPENTIN 900 MG: 400 CAPSULE ORAL at 23:17

## 2023-12-16 RX ADMIN — GABAPENTIN 300 MG: 300 CAPSULE ORAL at 20:31

## 2023-12-16 RX ADMIN — ENOXAPARIN SODIUM 40 MG: 40 INJECTION SUBCUTANEOUS at 20:32

## 2023-12-16 RX ADMIN — PHENOL 1 SPRAY: 1.4 SPRAY ORAL at 11:02

## 2023-12-16 RX ADMIN — ONDANSETRON 4 MG: 2 INJECTION INTRAMUSCULAR; INTRAVENOUS at 02:17

## 2023-12-16 ASSESSMENT — COGNITIVE AND FUNCTIONAL STATUS - GENERAL
DAILY ACTIVITIY SCORE: 24
MOBILITY SCORE: 24

## 2023-12-16 ASSESSMENT — ACTIVITIES OF DAILY LIVING (ADL): LACK_OF_TRANSPORTATION: NO

## 2023-12-16 ASSESSMENT — PAIN - FUNCTIONAL ASSESSMENT: PAIN_FUNCTIONAL_ASSESSMENT: 0-10

## 2023-12-16 ASSESSMENT — PAIN SCALES - GENERAL: PAINLEVEL_OUTOF10: 0 - NO PAIN

## 2023-12-16 NOTE — CONSULTS
Reason For Consult  Abdominal pain    History Of Present Illness  Paulie Byrd is a 53 y.o. female presenting with complaints of abdominal pain.  The patient has a long history of abdominal complaints.  She has seen multiple gastroenterologist and is currently seeing Dr. Storm.  She has a history of chronic pancreatitis from years of alcohol abuse but has been sober for 17 years.  She also has a history and documentation of gastroparesis.  She has had multiple previous hospitalizations for abdominal distress.  She states that she frequently has diarrhea but lately she was [place] with constipation.  She has had a recent colonoscopy that done this year that was unremarkable.  Prior to that she apparently had multiple colon polyps.     Past Medical History  She has a past medical history of Bilateral primary osteoarthritis of knee (01/19/2016), Chronic pain syndrome (06/06/2018), Chronic pancreatitis (CMS/[facility]), Contusion of lower back and pelvis, subsequent encounter (09/07/2016), Encounter for other preprocedural examination (12/02/2016), Encounter for other preprocedural examination (01/19/2016), Graves disease, Other abnormality of red blood cells (06/23/2015), Other tear of medial meniscus, current injury, right knee, subsequent encounter (01/19/2016), Pain in right knee (01/19/2016), Personal history of other diseases of the musculoskeletal system and connective tissue (11/11/2015), Personal history of other diseases of the musculoskeletal system and connective tissue (12/29/2014), Personal history of other endocrine, nutritional and metabolic disease (06/06/2018), Personal history of other specified conditions (12/11/2014), Personal history of other specified conditions (04/22/2015), Unilateral primary osteoarthritis, left knee (12/02/2016), and Unilateral primary osteoarthritis, right knee (03/04/2016).    Surgical History  She has a past surgical history that includes Other surgical history  "(06/27/2014); Cholecystectomy (06/27/2014); Hysterectomy (06/27/2014); Adenoidectomy (06/27/2014); Other surgical history (09/23/2021); and Other surgical history (09/23/2021).     Social History  She reports that she has quit smoking. She has never used smokeless tobacco. She reports that she does not currently use alcohol. She reports that she does not currently use drugs after having used the following drugs: \"Crack\" cocaine.    Family History  Family History   Problem Relation Name Age of Onset    Allergies Other          Allergies  Codeine and Grass pollen    Review of Systems  Patient denies chest pain she is currently having a migraine as well but no other pertinent complaints.     Physical Exam  Abdomen is slightly distended but soft she has some minimal suprapubic tenderness.     Last Recorded Vitals  Blood pressure 94/55, pulse 76, temperature 36 °C (96.8 °F), temperature source Temporal, resp. rate 18, height 1.651 m (5' 5\"), weight 60.8 kg (134 lb), SpO2 96 %.    Relevant Results  CT scan of the abdomen and pelvis reveals that it is primarily the colon that is dilated and fluid-filled.  There is unlikely to be any mechanical obstruction since the patient had a colonoscopy very recently.  This is likely functional and probably nothing new just a worse episode of what she typically has had in the past.     Assessment/Plan     Colonic ileus.  Recommend trying suppositories etc. to encourage GI function.    I spent 20 minutes in the professional and overall care of this patient.      Ashly Hwang MD    "

## 2023-12-16 NOTE — PROGRESS NOTES
"Internal Medicine-Daily Progress Note     Patient: Paulie ANDRADE Byrd  Room/bed:  135/135-A  Admitted on: 12/15/2023    Age: 53 y.o.   Gender:female   Code Status:  Full Code   Admitting Dx: Hypokalemia [E87.6]  Ileus (CMS/[facility]) [K56.7]  SBO (small bowel obstruction) (CMS/[facility]) [K56.609]  COTY (acute kidney injury) (CMS/[facility]) [N17.9]    MRN: [id number]  PCP: Mukesh Yeung DO     Attending: Mendel Giraldo DO Nurse: No care team member to display  TCC: No care team member to display            Chief Complaint     Chief Complaint   Patient presents with    Constipation        Subjective    Seen and examined at the bedside, looks uncomfortable but denies abdominal pain. Stated she passed flatus this morning. Surgery did not determine she requires emergent surgery at the time of this note.     Overnight Events: No overnight events     Objective    Vitals  Visit Vitals  /64 (BP Location: Right arm, Patient Position: Lying)   Pulse 82   Temp 36.3 °C (97.3 °F) (Temporal)   Resp 18   Ht 1.651 m (5' 5\")   Wt 60.8 kg (134 lb)   SpO2 94%   BMI 22.30 kg/m²   Smoking Status Former   BSA 1.67 m²       Physical Exam   Gen: Uncomfortable, NG tube appreciated.   HEENT: AT/NC, no conjunctival pallor, anicteric sclera, EOMI   Neck: supple, no LAD/JVD  Chest: Good air entry b/l, no adventitious sounds heard  CVS: s1 & S2 only, no MGR  Abd: soft, flat, NT/ND, BS+  Ext: no cyanosis/clubbing or pedal edema  Neuro: Aox3, cn 2-12 intact, motor 5/5 globally, sensation intact   IOs    Intake/Output Summary (Last 24 hours) at 12/16/2023 1406  Last data filed at 12/16/2023 0800  Gross per 24 hour   Intake 452 ml   Output --   Net 452 ml     Vent settings:       Labs:   Results from last 72 hours   Lab Units 12/16/23  0608 12/15/23  1412   SODIUM mmol/L 140 140   POTASSIUM mmol/L 4.0 3.1*   CHLORIDE mmol/L 109* 101   CO2 mmol/L 23 26   BUN mg/dL 15 26*   CREATININE mg/dL 0.92 1.40*   GLUCOSE mg/dL 76 89   CALCIUM mg/dL 8.1* 9.6   ANION GAP mmol/L " "12 16   EGFR mL/min/1.73m*2 74 45*   PHOSPHORUS mg/dL 3.0  --       Results from last 72 hours   Lab Units 12/16/23  0608 12/15/23  1412   WBC AUTO x10*3/uL 6.5 8.4   HEMOGLOBIN g/dL 10.1* 11.7*   HEMATOCRIT % 30.7* 34.5*   PLATELETS AUTO x10*3/uL 298 380   NEUTROS PCT AUTO %  --  59.7   LYMPHS PCT AUTO %  --  33.2   MONOS PCT AUTO %  --  5.8   EOS PCT AUTO %  --  0.7      Lab Results   Component Value Date    CALCIUM 8.1 (L) 12/16/2023    PHOS 3.0 12/16/2023      Lab Results   Component Value Date    CRP 0.13 06/15/2023      [unfilled]   Micro/ID:   No results found for the last 90 days.     .ID  No results found for: \"URINECULTURE\", \"BLOODCULT\", \"CSFCULTSMEAR\"  Images  XR abdomen 1 view  Narrative: Interpreted By:  Rajesh Zaidi,   STUDY:  XR ABDOMEN 1 VIEW;  12/15/2023 9:22 pm      INDICATION:  Signs/Symptoms:confirmation of NG tube post advancement.      COMPARISON:  Earlier on 12/15/2023      ACCESSION NUMBER(S):  [id number]      ORDERING CLINICIAN:  ANAYELI DAVENPORT      FINDINGS:  Nasogastric tube has been slightly advanced and tip now terminates in  the left upper abdomen. The side opening is just distal to the  gastroesophageal junction. There is redemonstration of gaseous  distention of bowel throughout the image abdomen.      Mild left basilar atelectasis.      Impression: Nasogastric tube has been slightly advanced and tip terminates in the  left upper abdomen at the level of the proximal stomach. The side  opening is now located just distal to the level of the  gastroesophageal junction.      MACRO:  None      Signed by: Rajesh Zaidi 12/16/2023 12:56 AM  Dictation workstation:   [id number]    Meds  Scheduled medications  albuterol, 2 puff, inhalation, q4h  atorvastatin, 40 mg, oral, Daily  bisacodyl, 10 mg, rectal, Daily  buPROPion XL, 300 mg, oral, q AM  enoxaparin, 40 mg, subcutaneous, q24h  gabapentin, 300 mg, nasogastric tube, BID  levothyroxine, 100 mcg, oral, Daily before " breakfast  pancrelipase (Lip-Prot-Amyl), 2 capsule, oral, TID  pantoprazole, 40 mg, oral, Daily  [Held by provider] spironolactone, 100 mg, nasogastric tube, Daily      Continuous medications  lactated Ringer's, 100 mL/hr, Last Rate: 100 mL/hr (12/16/23 1102)      PRN medications  PRN medications: HYDROmorphone, HYDROmorphone, ondansetron, phenoL     Problem List    Problem list:   Patient Active Problem List   Diagnosis    Arthralgia of multiple joints    Bipolar 2 disorder (CMS/[facility])    COPD (chronic obstructive pulmonary disease) (CMS/[facility])    Depression with anxiety    Graves disease    History of alcohol abuse    History of substance abuse (CMS/[facility])    Hyperlipidemia    Hypokalemia    Incontinence of urine    Menopausal symptoms    Restless legs syndrome    Right leg swelling    Right nephrolithiasis    Primary localized osteoarthrosis, lower leg    Pain in joint, lower leg    Nausea in adult    Secondary hypothyroidism    Sleep disturbance    Tobacco use disorder    Vitamin D deficiency disease    Anxiety    Atypical mole    Gastritis, chronic    Hair loss    Hemangioma of skin and subcutaneous tissue    History of colonic polyps    Melanocytic nevi of trunk    Melanocytic nevi of unspecified lower limb, including hip    Melanocytic nevi of unspecified upper limb, including shoulder    Other melanin hyperpigmentation    Other seborrheic keratosis    Prediabetes    Other hypertrophic disorders of the skin    Scar condition and fibrosis of skin    Seasonal allergies    Telogen effluvium    Ileus (CMS/[facility])        Assessment and Plan    Paulie Byrd is a 53 y.o. female presenting with with past medical history of Chronic Pancreatitis, Hypothyroidism, Gastroparesis, Alopecia present to the e.d complaining of worsening abdominal pain.      Acute Medical Issues  #SBO  #Abdominal Pain  Imaging findings: Multiple fluid-filled and dilated colonic loops, due to acute colitis, ileus or developing rectal  obstruction  Plan  - Pain regimen in place  - Antiemetic in place  - Surgery consulted pending rec     #Acute Kidney Injury   - Pre renal in nature  - On Maintenance Fluid LR 80ml/hr     #Hypokalemia  - History of hypokalemia  - Monitor and replete     Chronic Medical issues  #Restless Leg Syndrome: Gabapentin reduce to 300 twice daily in the setting of COTY  #Alopecia: continue Spironolactone  #Hypothyroidism: Continue Levothyroxine  #HLD: continue atorvastatin     Access: PIV  Diet: npo  GI prophylaxis: PPI  DVT Prophylaxis: Lovenox  Antibiotics: NONE  Code: Full     Disposition: Patient admitted for SBO. Not a candidate for emergent surgery at this time. NG tube will be removed today if no nausea or vomiting.     Karla Mary MD   PGY-2   Epic chat

## 2023-12-16 NOTE — PROGRESS NOTES
12/16/23 1049   Discharge Planning   Living Arrangements Spouse/significant other   Support Systems Spouse/significant other   Assistance Needed none, independent   Type of Residence Private residence   Who is requesting discharge planning? Provider   Home or Post Acute Services None   Patient expects to be discharged to: home no needs   Does the patient need discharge transport arranged? No   Financial Resource Strain   How hard is it for you to pay for the very basics like food, housing, medical care, and heating? Not hard   Housing Stability   In the last 12 months, was there a time when you were not able to pay the mortgage or rent on time? N   In the last 12 months, how many places have you lived? 1   In the last 12 months, was there a time when you did not have a steady place to sleep or slept in a shelter (including now)? N   Transportation Needs   In the past 12 months, has lack of transportation kept you from medical appointments or from getting medications? no   In the past 12 months, has lack of transportation kept you from meetings, work, or from getting things needed for daily living? No   Patient Choice   Provider Choice list and CMS website (https://medicare.gov/care-compare#search) for post-acute Quality and Resource Measure Data were provided and reviewed with: Other (Comment)  (N/A)     Home NN

## 2023-12-17 LAB
ALBUMIN SERPL BCP-MCNC: 3.2 G/DL (ref 3.4–5)
ANION GAP SERPL CALC-SCNC: 8 MMOL/L (ref 10–20)
BUN SERPL-MCNC: 13 MG/DL (ref 6–23)
CALCIUM SERPL-MCNC: 7.9 MG/DL (ref 8.6–10.3)
CHLORIDE SERPL-SCNC: 109 MMOL/L (ref 98–107)
CO2 SERPL-SCNC: 27 MMOL/L (ref 21–32)
CREAT SERPL-MCNC: 0.75 MG/DL (ref 0.5–1.05)
ERYTHROCYTE [DISTWIDTH] IN BLOOD BY AUTOMATED COUNT: 12.4 % (ref 11.5–14.5)
GFR SERPL CREATININE-BSD FRML MDRD: >90 ML/MIN/1.73M*2
GLUCOSE SERPL-MCNC: 84 MG/DL (ref 74–99)
HCT VFR BLD AUTO: 26 % (ref 36–46)
HGB BLD-MCNC: 9 G/DL (ref 12–16)
MAGNESIUM SERPL-MCNC: 1.81 MG/DL (ref 1.6–2.4)
MCH RBC QN AUTO: 32.5 PG (ref 26–34)
MCHC RBC AUTO-ENTMCNC: 34.6 G/DL (ref 32–36)
MCV RBC AUTO: 94 FL (ref 80–100)
NRBC BLD-RTO: 0 /100 WBCS (ref 0–0)
PHOSPHATE SERPL-MCNC: 1.6 MG/DL (ref 2.5–4.9)
PLATELET # BLD AUTO: 289 X10*3/UL (ref 150–450)
POTASSIUM SERPL-SCNC: 3.6 MMOL/L (ref 3.5–5.3)
RBC # BLD AUTO: 2.77 X10*6/UL (ref 4–5.2)
SODIUM SERPL-SCNC: 140 MMOL/L (ref 136–145)
WBC # BLD AUTO: 6.8 X10*3/UL (ref 4.4–11.3)

## 2023-12-17 PROCEDURE — 2500000004 HC RX 250 GENERAL PHARMACY W/ HCPCS (ALT 636 FOR OP/ED)

## 2023-12-17 PROCEDURE — 80069 RENAL FUNCTION PANEL: CPT

## 2023-12-17 PROCEDURE — 36415 COLL VENOUS BLD VENIPUNCTURE: CPT

## 2023-12-17 PROCEDURE — 2500000004 HC RX 250 GENERAL PHARMACY W/ HCPCS (ALT 636 FOR OP/ED): Performed by: INTERNAL MEDICINE

## 2023-12-17 PROCEDURE — 99232 SBSQ HOSP IP/OBS MODERATE 35: CPT

## 2023-12-17 PROCEDURE — 83735 ASSAY OF MAGNESIUM: CPT

## 2023-12-17 PROCEDURE — 94760 N-INVAS EAR/PLS OXIMETRY 1: CPT

## 2023-12-17 PROCEDURE — 96372 THER/PROPH/DIAG INJ SC/IM: CPT

## 2023-12-17 PROCEDURE — 85027 COMPLETE CBC AUTOMATED: CPT

## 2023-12-17 PROCEDURE — 2500000001 HC RX 250 WO HCPCS SELF ADMINISTERED DRUGS (ALT 637 FOR MEDICARE OP)

## 2023-12-17 PROCEDURE — 1100000001 HC PRIVATE ROOM DAILY

## 2023-12-17 PROCEDURE — 99231 SBSQ HOSP IP/OBS SF/LOW 25: CPT | Performed by: SURGERY

## 2023-12-17 RX ORDER — ACETAMINOPHEN 325 MG/1
650 TABLET ORAL EVERY 6 HOURS PRN
Status: DISCONTINUED | OUTPATIENT
Start: 2023-12-17 | End: 2023-12-19 | Stop reason: HOSPADM

## 2023-12-17 RX ADMIN — ACETAMINOPHEN 650 MG: 325 TABLET ORAL at 06:44

## 2023-12-17 RX ADMIN — BUPROPION HYDROCHLORIDE 300 MG: 150 TABLET, FILM COATED, EXTENDED RELEASE ORAL at 09:59

## 2023-12-17 RX ADMIN — ENOXAPARIN SODIUM 40 MG: 40 INJECTION SUBCUTANEOUS at 21:19

## 2023-12-17 RX ADMIN — GABAPENTIN 1200 MG: 400 CAPSULE ORAL at 21:19

## 2023-12-17 RX ADMIN — LEVOTHYROXINE SODIUM 100 MCG: 100 TABLET ORAL at 06:02

## 2023-12-17 RX ADMIN — SODIUM CHLORIDE, POTASSIUM CHLORIDE, SODIUM LACTATE AND CALCIUM CHLORIDE 100 ML/HR: 600; 310; 30; 20 INJECTION, SOLUTION INTRAVENOUS at 06:07

## 2023-12-17 ASSESSMENT — COGNITIVE AND FUNCTIONAL STATUS - GENERAL
MOBILITY SCORE: 24
DAILY ACTIVITIY SCORE: 24
DAILY ACTIVITIY SCORE: 24
MOBILITY SCORE: 24

## 2023-12-17 ASSESSMENT — PAIN - FUNCTIONAL ASSESSMENT
PAIN_FUNCTIONAL_ASSESSMENT: 0-10

## 2023-12-17 ASSESSMENT — PAIN SCALES - GENERAL
PAINLEVEL_OUTOF10: 0 - NO PAIN
PAINLEVEL_OUTOF10: 0 - NO PAIN
PAINLEVEL_OUTOF10: 3

## 2023-12-17 ASSESSMENT — PAIN DESCRIPTION - LOCATION: LOCATION: HEAD

## 2023-12-17 NOTE — HOSPITAL COURSE
HPI: Paulie Byrd is a 53 y.o. female presenting with with past medical history of Chronic Pancreatitis, Hypothyroidism, Gastroparesis, Alopecia present to the e.d complaining of worsening abdominal pain. Patient states that pain started on Monday and has progressively gotten worse. Pain is located at right upper quadrant, non radiating and rate it 10/10. Today she took a lot of tylenol and motrin but did not notice any improvement. She states that she has a bowel movement this morning, it was liquid in nature. Patient does not remember if she has pass gas yet. She denies fever, feeling, nausea, vomiting, chest pain, leg pain or swelling      Hospital Course:   Patient was admitted for colonic ileus was placed on n.p.o.  NG tube in place.  Showed acute kidney injury likely secondary to prerenal azotemia for which she was given IV fluids.  Spironolactone which was taken for alopecia was also held as her blood pressure was soft at times.  Surgery was consulted who did not recommend emergent abdominal surgery at this time.  12/16/2023 mg tube was removed and diet advanced to clear liquid diet. Patient's diet was advanced as tolerated and she did have a small bowel movement.  On the morning of 12/19, patient was experiencing 7/10 chest pain that was non-radiating in nature.  EKG showed normal sinus rhythm, non-ischemic and troponin was negative. Pain relieved with sublingual nitroglycerin. Heme/Onc consulted requested per patient for anemia with Hgb of 9.6 and recommended outpatient follow up. Patient medically stable and discharged home on 12/19/23. Sent with script of Zofran PRN. Follow ups to be scheduled with PCP, general surgery, and heme/onc on discharge. Instructed to repeat CBC and BMP within one week to check potassium and Hgb levels.

## 2023-12-17 NOTE — CARE PLAN
The patient's goals for the shift include  tolerate diet    The clinical goals for the shift include pain control    Over the shift, the patient did not make progress toward the following goals. Barriers to progression include recent recent bowel obstruction. Recommendations to address these barriers include support and ambulation.

## 2023-12-17 NOTE — NURSING NOTE
1930: assumed pt care    2025: pt is upset b/c she is only allowed to have ice chips, states she is supposed to be seeing if she can tolerate clears, asking Dr. Hwang, she only gave permission for ice chips so far    2046: pt states she takes 1200mg of neurontin at night, not 300 mg BID, asking Kera San NP     0605  12/17/23- pt states she has a HA, nothing is ordered, asking Kera San NP for tylenol

## 2023-12-17 NOTE — PROGRESS NOTES
"Paulie Byrd is a 53 y.o. female on day 2 of admission presenting with Ileus (CMS/HCC).    Subjective   Patient feels much better states she has been passing a lot of flatus.       Objective     Physical Exam abdomen is now flat soft and completely nontender.  Extremities do not reveal atrial for midis.  Lungs are clear.  Heart is regular rate and rhythm.    Last Recorded Vitals  Blood pressure 96/58, pulse 72, temperature 36.3 °C (97.4 °F), temperature source Temporal, resp. rate 16, height 1.651 m (5' 5\"), weight 60.8 kg (134 lb), SpO2 97 %.  Intake/Output last 3 Shifts:  I/O last 3 completed shifts:  In: 3259 (53.6 mL/kg) [P.O.:300; I.V.:2959 (48.7 mL/kg)]  Out: - (0 mL/kg)   Weight: 60.8 kg     Relevant Results                Slight anemia             Assessment/Plan   Principal Problem:    Ileus (CMS/HCC)    Colonic ileus seems to have resolved can start out with a liquid diet and advance as tolerated       I spent 10 minutes in the professional and overall care of this patient.      Ashly Hwang MD      "

## 2023-12-17 NOTE — PROGRESS NOTES
"Internal Medicine-Daily Progress Note     Patient: Paulie ANDRADE Pisek  Room/bed:  135/135-A  Admitted on: 12/15/2023    Age: 53 y.o.   Gender:female   Code Status:  Full Code   Admitting Dx: Hypokalemia [E87.6]  Ileus (CMS/[facility]) [K56.7]  SBO (small bowel obstruction) (CMS/[facility]) [K56.609]  COTY (acute kidney injury) (CMS/[facility]) [N17.9]    MRN: [id number]  PCP: Mukesh Yeung DO     Attending: Mendel Giraldo DO Nurse: No care team member to display  TCC: No care team member to display            Chief Complaint     Chief Complaint   Patient presents with    Constipation        Subjective    Seen and examined at the bedside in no acute distress, doing well. Denies abdominal pain, nausea or vomiting. No new complaints   Overnight Events: No overnight events     Objective    Vitals  Visit Vitals  BP 96/58 (BP Location: Right arm, Patient Position: Lying)   Pulse 72   Temp 36.3 °C (97.4 °F) (Temporal)   Resp 16   Ht 1.651 m (5' 5\")   Wt 60.8 kg (134 lb)   SpO2 97%   BMI 22.30 kg/m²   Smoking Status Former   BSA 1.67 m²       Physical Exam   Gen: Uncomfortable, NG tube appreciated.   HEENT: AT/NC, no conjunctival pallor, anicteric sclera, EOMI   Neck: supple, no LAD/JVD  Chest: Good air entry b/l, no adventitious sounds heard  CVS: s1 & S2 only, no MGR  Abd: soft, flat, NT/ND, BS+  Ext: no cyanosis/clubbing or pedal edema  Neuro: Aox3, cn 2-12 intact, motor 5/5 globally, sensation intact   IOs    Intake/Output Summary (Last 24 hours) at 12/17/2023 1217  Last data filed at 12/17/2023 0600  Gross per 24 hour   Intake 2807 ml   Output --   Net 2807 ml     Vent settings:       Labs:   Results from last 72 hours   Lab Units 12/17/23  0535 12/16/23  0608 12/15/23  1412   SODIUM mmol/L 140 140 140   POTASSIUM mmol/L 3.6 4.0 3.1*   CHLORIDE mmol/L 109* 109* 101   CO2 mmol/L 27 23 26   BUN mg/dL 13 15 26*   CREATININE mg/dL 0.75 0.92 1.40*   GLUCOSE mg/dL 84 76 89   CALCIUM mg/dL 7.9* 8.1* 9.6   ANION GAP mmol/L 8* 12 16   EGFR " "mL/min/1.73m*2 >90 74 45*   PHOSPHORUS mg/dL 1.6* 3.0  --       Results from last 72 hours   Lab Units 12/17/23  0535 12/16/23  0608 12/15/23  1412   WBC AUTO x10*3/uL 6.8 6.5 8.4   HEMOGLOBIN g/dL 9.0* 10.1* 11.7*   HEMATOCRIT % 26.0* 30.7* 34.5*   PLATELETS AUTO x10*3/uL 289 298 380   NEUTROS PCT AUTO %  --   --  59.7   LYMPHS PCT AUTO %  --   --  33.2   MONOS PCT AUTO %  --   --  5.8   EOS PCT AUTO %  --   --  0.7      Lab Results   Component Value Date    CALCIUM 7.9 (L) 12/17/2023    PHOS 1.6 (L) 12/17/2023      Lab Results   Component Value Date    CRP 0.13 06/15/2023      [unfilled]   Micro/ID:   No results found for the last 90 days.     .ID  No results found for: \"URINECULTURE\", \"BLOODCULT\", \"CSFCULTSMEAR\"  Images  XR abdomen 1 view  Narrative: Interpreted By:  Rajesh Zaidi,   STUDY:  XR ABDOMEN 1 VIEW;  12/15/2023 9:22 pm      INDICATION:  Signs/Symptoms:confirmation of NG tube post advancement.      COMPARISON:  Earlier on 12/15/2023      ACCESSION NUMBER(S):  [id number]      ORDERING CLINICIAN:  ANAYELI DAVENPORT      FINDINGS:  Nasogastric tube has been slightly advanced and tip now terminates in  the left upper abdomen. The side opening is just distal to the  gastroesophageal junction. There is redemonstration of gaseous  distention of bowel throughout the image abdomen.      Mild left basilar atelectasis.      Impression: Nasogastric tube has been slightly advanced and tip terminates in the  left upper abdomen at the level of the proximal stomach. The side  opening is now located just distal to the level of the  gastroesophageal junction.      MACRO:  None      Signed by: Rajesh Zaidi 12/16/2023 12:56 AM  Dictation workstation:   [id number]    Meds  Scheduled medications  albuterol, 2 puff, inhalation, q4h  atorvastatin, 40 mg, oral, Daily  bisacodyl, 10 mg, rectal, Daily  buPROPion XL, 300 mg, oral, q AM  enoxaparin, 40 mg, subcutaneous, q24h  gabapentin, 1,200 mg, nasogastric tube, " Nightly  levothyroxine, 100 mcg, oral, Daily before breakfast  pancrelipase (Lip-Prot-Amyl), 2 capsule, oral, TID  pantoprazole, 40 mg, oral, Daily  [Held by provider] spironolactone, 100 mg, nasogastric tube, Daily      Continuous medications  lactated Ringer's, 100 mL/hr, Last Rate: 100 mL/hr (12/17/23 0607)      PRN medications  PRN medications: acetaminophen, HYDROmorphone, HYDROmorphone, ondansetron, phenoL     Problem List    Problem list:   Patient Active Problem List   Diagnosis    Arthralgia of multiple joints    Bipolar 2 disorder (CMS/[facility])    COPD (chronic obstructive pulmonary disease) (CMS/[facility])    Depression with anxiety    Graves disease    History of alcohol abuse    History of substance abuse (CMS/[facility])    Hyperlipidemia    Hypokalemia    Incontinence of urine    Menopausal symptoms    Restless legs syndrome    Right leg swelling    Right nephrolithiasis    Primary localized osteoarthrosis, lower leg    Pain in joint, lower leg    Nausea in adult    Secondary hypothyroidism    Sleep disturbance    Tobacco use disorder    Vitamin D deficiency disease    Anxiety    Atypical mole    Gastritis, chronic    Hair loss    Hemangioma of skin and subcutaneous tissue    History of colonic polyps    Melanocytic nevi of trunk    Melanocytic nevi of unspecified lower limb, including hip    Melanocytic nevi of unspecified upper limb, including shoulder    Other melanin hyperpigmentation    Other seborrheic keratosis    Prediabetes    Other hypertrophic disorders of the skin    Scar condition and fibrosis of skin    Seasonal allergies    Telogen effluvium    Ileus (CMS/[facility])        Assessment and Plan    Paulie Byrd is a 53 y.o. female presenting with with past medical history of Chronic Pancreatitis, Hypothyroidism, Gastroparesis, Alopecia present to the e.d complaining of worsening abdominal pain.      Acute Medical Issues  # Colonic ileus-Resolved   #Abdominal Pain-resolved   Imaging findings: Multiple  fluid-filled and dilated colonic loops, due to acute colitis, ileus or developing rectal obstruction  Plan  - Pain regimen in place  - Antiemetic in place  - NG tube removed yesterday   -Advance diet as tolerated      #Acute Kidney Injury   - Pre renal in nature  - On Maintenance Fluid LR 80ml/hr     #Hypokalemia  - History of hypokalemia  - Monitor and replete     Chronic Medical issues  #Restless Leg Syndrome: Gabapentin reduce to 300 twice daily in the setting of COTY  #Alopecia: continue Spironolactone  #Hypothyroidism: Continue Levothyroxine  #HLD: continue atorvastatin     Access: PIV  Diet: npo  GI prophylaxis: PPI  DVT Prophylaxis: Lovenox  Antibiotics: NONE  Code: Full     Disposition: Patient admitted for SBO. Not a candidate for emergent surgery at this time. NG tube removed yesterday. Diet advanced as tolerated. Possible discharge tomorrow.    Karla Mary MD   PGY-2   Epic chat

## 2023-12-18 LAB
ALBUMIN SERPL BCP-MCNC: 3.2 G/DL (ref 3.4–5)
ANION GAP SERPL CALC-SCNC: 9 MMOL/L (ref 10–20)
BUN SERPL-MCNC: 10 MG/DL (ref 6–23)
CA-I BLD-SCNC: 1.15 MMOL/L (ref 1.1–1.33)
CALCIUM SERPL-MCNC: 7.7 MG/DL (ref 8.6–10.3)
CHLORIDE SERPL-SCNC: 111 MMOL/L (ref 98–107)
CO2 SERPL-SCNC: 28 MMOL/L (ref 21–32)
CREAT SERPL-MCNC: 0.74 MG/DL (ref 0.5–1.05)
ERYTHROCYTE [DISTWIDTH] IN BLOOD BY AUTOMATED COUNT: 12.5 % (ref 11.5–14.5)
FERRITIN SERPL-MCNC: 62 NG/ML (ref 8–150)
GFR SERPL CREATININE-BSD FRML MDRD: >90 ML/MIN/1.73M*2
GLUCOSE SERPL-MCNC: 89 MG/DL (ref 74–99)
HCT VFR BLD AUTO: 28.5 % (ref 36–46)
HGB BLD-MCNC: 9.5 G/DL (ref 12–16)
IRON SATN MFR SERPL: 22 % (ref 25–45)
IRON SERPL-MCNC: 45 UG/DL (ref 35–150)
MAGNESIUM SERPL-MCNC: 1.71 MG/DL (ref 1.6–2.4)
MCH RBC QN AUTO: 31.3 PG (ref 26–34)
MCHC RBC AUTO-ENTMCNC: 33.3 G/DL (ref 32–36)
MCV RBC AUTO: 94 FL (ref 80–100)
NRBC BLD-RTO: 0 /100 WBCS (ref 0–0)
PHOSPHATE SERPL-MCNC: 2.7 MG/DL (ref 2.5–4.9)
PLATELET # BLD AUTO: 308 X10*3/UL (ref 150–450)
POTASSIUM SERPL-SCNC: 3.3 MMOL/L (ref 3.5–5.3)
RBC # BLD AUTO: 3.04 X10*6/UL (ref 4–5.2)
SODIUM SERPL-SCNC: 145 MMOL/L (ref 136–145)
TIBC SERPL-MCNC: 209 UG/DL (ref 240–445)
UIBC SERPL-MCNC: 164 UG/DL (ref 110–370)
WBC # BLD AUTO: 6.7 X10*3/UL (ref 4.4–11.3)

## 2023-12-18 PROCEDURE — 83735 ASSAY OF MAGNESIUM: CPT

## 2023-12-18 PROCEDURE — 82746 ASSAY OF FOLIC ACID SERUM: CPT | Mod: GEALAB

## 2023-12-18 PROCEDURE — 82728 ASSAY OF FERRITIN: CPT

## 2023-12-18 PROCEDURE — 2500000001 HC RX 250 WO HCPCS SELF ADMINISTERED DRUGS (ALT 637 FOR MEDICARE OP)

## 2023-12-18 PROCEDURE — 82330 ASSAY OF CALCIUM: CPT

## 2023-12-18 PROCEDURE — 96372 THER/PROPH/DIAG INJ SC/IM: CPT

## 2023-12-18 PROCEDURE — 82607 VITAMIN B-12: CPT | Mod: GEALAB

## 2023-12-18 PROCEDURE — 1100000001 HC PRIVATE ROOM DAILY

## 2023-12-18 PROCEDURE — 2500000004 HC RX 250 GENERAL PHARMACY W/ HCPCS (ALT 636 FOR OP/ED)

## 2023-12-18 PROCEDURE — 36415 COLL VENOUS BLD VENIPUNCTURE: CPT

## 2023-12-18 PROCEDURE — 99232 SBSQ HOSP IP/OBS MODERATE 35: CPT

## 2023-12-18 PROCEDURE — 84466 ASSAY OF TRANSFERRIN: CPT | Mod: GEALAB

## 2023-12-18 PROCEDURE — 2500000001 HC RX 250 WO HCPCS SELF ADMINISTERED DRUGS (ALT 637 FOR MEDICARE OP): Performed by: SURGERY

## 2023-12-18 PROCEDURE — 84100 ASSAY OF PHOSPHORUS: CPT

## 2023-12-18 PROCEDURE — 85027 COMPLETE CBC AUTOMATED: CPT

## 2023-12-18 PROCEDURE — 83550 IRON BINDING TEST: CPT

## 2023-12-18 RX ORDER — POTASSIUM CHLORIDE 1.5 G/1.58G
40 POWDER, FOR SOLUTION ORAL ONCE
Status: COMPLETED | OUTPATIENT
Start: 2023-12-18 | End: 2023-12-18

## 2023-12-18 RX ORDER — BUPROPION HYDROCHLORIDE 150 MG/1
150 TABLET ORAL EVERY MORNING
Status: DISCONTINUED | OUTPATIENT
Start: 2023-12-19 | End: 2023-12-19 | Stop reason: HOSPADM

## 2023-12-18 RX ADMIN — ONDANSETRON 4 MG: 2 INJECTION INTRAMUSCULAR; INTRAVENOUS at 11:50

## 2023-12-18 RX ADMIN — SODIUM CHLORIDE, POTASSIUM CHLORIDE, SODIUM LACTATE AND CALCIUM CHLORIDE 1000 ML: 600; 310; 30; 20 INJECTION, SOLUTION INTRAVENOUS at 07:45

## 2023-12-18 RX ADMIN — PANCRELIPASE 2 CAPSULE: 36000; 180000; 114000 CAPSULE, DELAYED RELEASE PELLETS ORAL at 15:18

## 2023-12-18 RX ADMIN — PANCRELIPASE 2 CAPSULE: 36000; 180000; 114000 CAPSULE, DELAYED RELEASE PELLETS ORAL at 22:03

## 2023-12-18 RX ADMIN — BISACODYL 10 MG: 10 SUPPOSITORY RECTAL at 08:12

## 2023-12-18 RX ADMIN — ATORVASTATIN CALCIUM 40 MG: 40 TABLET, FILM COATED ORAL at 08:12

## 2023-12-18 RX ADMIN — ENOXAPARIN SODIUM 40 MG: 40 INJECTION SUBCUTANEOUS at 20:00

## 2023-12-18 RX ADMIN — LEVOTHYROXINE SODIUM 100 MCG: 100 TABLET ORAL at 06:34

## 2023-12-18 RX ADMIN — BUPROPION HYDROCHLORIDE 300 MG: 150 TABLET, FILM COATED, EXTENDED RELEASE ORAL at 08:12

## 2023-12-18 RX ADMIN — PANCRELIPASE 2 CAPSULE: 36000; 180000; 114000 CAPSULE, DELAYED RELEASE PELLETS ORAL at 06:34

## 2023-12-18 RX ADMIN — PANTOPRAZOLE SODIUM 40 MG: 40 TABLET, DELAYED RELEASE ORAL at 08:12

## 2023-12-18 RX ADMIN — GABAPENTIN 1200 MG: 400 CAPSULE ORAL at 20:00

## 2023-12-18 RX ADMIN — POTASSIUM CHLORIDE 40 MEQ: 1.5 POWDER, FOR SOLUTION ORAL at 08:12

## 2023-12-18 ASSESSMENT — COGNITIVE AND FUNCTIONAL STATUS - GENERAL
DAILY ACTIVITIY SCORE: 24
DAILY ACTIVITIY SCORE: 24
MOBILITY SCORE: 24
MOBILITY SCORE: 24

## 2023-12-18 ASSESSMENT — PAIN - FUNCTIONAL ASSESSMENT
PAIN_FUNCTIONAL_ASSESSMENT: 0-10

## 2023-12-18 ASSESSMENT — PAIN SCALES - GENERAL
PAINLEVEL_OUTOF10: 0 - NO PAIN

## 2023-12-18 NOTE — CARE PLAN
The patient's goals for the shift include  pain management and sleep    Problem: Pain  Goal: My pain/discomfort is manageable  Outcome: Progressing     Problem: Safety  Goal: Patient will be injury free during hospitalization  Outcome: Progressing     Problem: Daily Care  Goal: Daily care needs are met  Outcome: Progressing     Problem: Psychosocial Needs  Goal: Demonstrates ability to cope with hospitalization/illness  Outcome: Progressing     Problem: Discharge Barriers  Goal: My discharge needs are met  Outcome: Progressing       The clinical goals for the shift include Pt will have pain managed to allow for at least 5 hours of sleep this shift.    Over the shift, the patient denied any pain and was able to get more than 5 hours of sleep. Her VS remained stable and she was free from falls during this shift. She was able to ambulate to the bathroom and void without any complications. She seems to be coping well with this hospital stay and is progressing toward discharge. Today she will need to work on eating soft foods and ambulating to help have a BM. Patient is sleeping with personal items and call light within reach.

## 2023-12-18 NOTE — PROGRESS NOTES
Patient: Paulie Byrd  Room/bed: 146/146-A  Admitted on: 12/15/2023    Age: 53 y.o.   Gender: female  Code Status:  Full Code   Admitting Dx: Hypokalemia [E87.6]  Ileus (CMS/[facility]) [K56.7]  SBO (small bowel obstruction) (CMS/[facility]) [K56.609]  COTY (acute kidney injury) (CMS/[facility]) [N17.9]    MRN: [id number]  PCP: Mukesh Yeung DO  Preferred Pharm: [unfilled]    Attending: [unfilled]  Resident: Susana Blum DO  TCC: No care team member to display      Overnight Events     No acute events overnight     Subjective   Patient seen and examined this morning, states that she is feeling better overall. Denies CP, SOB, or abdominal pain. Notified by nursing staff this afternoon that she was feeling nauseous. Still has not had a BM.     Objective    Physical Exam  Constitutional:       Appearance: Normal appearance.   Cardiovascular:      Rate and Rhythm: Normal rate and regular rhythm.      Heart sounds: Normal heart sounds. No murmur heard.     No friction rub. No gallop.   Pulmonary:      Effort: Pulmonary effort is normal.      Breath sounds: Normal breath sounds. No wheezing or rhonchi.   Abdominal:      General: Bowel sounds are normal. There is no distension.      Palpations: Abdomen is soft.      Tenderness: There is no abdominal tenderness.   Musculoskeletal:         General: No swelling.   Skin:     General: Skin is warm and dry.      Findings: No bruising or rash.   Neurological:      General: No focal deficit present.      Mental Status: She is alert and oriented to person, place, and time.   Psychiatric:         Mood and Affect: Mood normal.         Thought Content: Thought content normal.         Judgment: Judgment normal.        Temp:  [36.8 °C (98.2 °F)-37.3 °C (99.1 °F)] 37.3 °C (99.1 °F)  Heart Rate:  [66-76] 66  Resp:  [16] 16  BP: ()/(57-61) 96/61      Intake/Output Summary (Last 24 hours) at 12/18/2023 4925  Last data filed at 12/17/2023 2000  Gross per 24 hour   Intake 201 ml   Output --   Net 201  "ml       Vitals:    12/15/23 1151   Weight: 60.8 kg (134 lb)             I/Os    Intake/Output Summary (Last 24 hours) at 12/18/2023 1439  Last data filed at 12/17/2023 2000  Gross per 24 hour   Intake 201 ml   Output --   Net 201 ml       Labs:   Results from last 72 hours   Lab Units 12/18/23  0513 12/17/23  0535 12/16/23  0608   SODIUM mmol/L 145 140 140   POTASSIUM mmol/L 3.3* 3.6 4.0   CHLORIDE mmol/L 111* 109* 109*   CO2 mmol/L 28 27 23   BUN mg/dL 10 13 15   CREATININE mg/dL 0.74 0.75 0.92   GLUCOSE mg/dL 89 84 76   CALCIUM mg/dL 7.7* 7.9* 8.1*   ANION GAP mmol/L 9* 8* 12   EGFR mL/min/1.73m*2 >90 >90 74   PHOSPHORUS mg/dL 2.7 1.6* 3.0      Results from last 72 hours   Lab Units 12/18/23  0513 12/17/23  0535 12/16/23  0608   WBC AUTO x10*3/uL 6.7 6.8 6.5   HEMOGLOBIN g/dL 9.5* 9.0* 10.1*   HEMATOCRIT % 28.5* 26.0* 30.7*   PLATELETS AUTO x10*3/uL 308 289 298      Lab Results   Component Value Date    CALCIUM 7.7 (L) 12/18/2023    PHOS 2.7 12/18/2023      Lab Results   Component Value Date    CRP 0.13 06/15/2023       Micro/ID:   No results found for the last 90 days.                   No lab exists for component: \"AGALPCRNB\"   .ID  No results found for: \"URINECULTURE\", \"BLOODCULT\", \"CSFCULTSMEAR\"    Images:  XR abdomen 1 view  Narrative: Interpreted By:  Rajesh Zaidi,   STUDY:  XR ABDOMEN 1 VIEW;  12/15/2023 9:22 pm      INDICATION:  Signs/Symptoms:confirmation of NG tube post advancement.      COMPARISON:  Earlier on 12/15/2023      ACCESSION NUMBER(S):  [id number]      ORDERING CLINICIAN:  ANAYELI DAVENPORT      FINDINGS:  Nasogastric tube has been slightly advanced and tip now terminates in  the left upper abdomen. The side opening is just distal to the  gastroesophageal junction. There is redemonstration of gaseous  distention of bowel throughout the image abdomen.      Mild left basilar atelectasis.      Impression: Nasogastric tube has been slightly advanced and tip terminates in the  left upper abdomen at " the level of the proximal stomach. The side  opening is now located just distal to the level of the  gastroesophageal junction.      MACRO:  None      Signed by: Rajesh Zaidi 12/16/2023 12:56 AM  Dictation workstation:   [id number]       Meds    Scheduled medications  albuterol, 2 puff, inhalation, q4h  bisacodyl, 10 mg, rectal, Daily  [START ON 12/19/2023] buPROPion XL, 150 mg, oral, q AM  enoxaparin, 40 mg, subcutaneous, q24h  gabapentin, 1,200 mg, nasogastric tube, Nightly  levothyroxine, 100 mcg, oral, Daily before breakfast  pancrelipase (Lip-Prot-Amyl), 2 capsule, oral, TID  pantoprazole, 40 mg, oral, Daily  [Held by provider] spironolactone, 100 mg, nasogastric tube, Daily      Continuous medications     PRN medications  PRN medications: acetaminophen, HYDROmorphone, HYDROmorphone, ondansetron, phenoL     Assessment and Plan      Paulie Byrd is a 53 y.o. female presenting with with past medical history of Chronic Pancreatitis, Hypothyroidism, Gastroparesis, Alopecia present to the e.d complaining of worsening abdominal pain.      Acute Medical Issues:  # Colonic ileus-Resolved   # Abdominal Pain-resolved   - Imaging findings: Multiple fluid-filled and dilated colonic loops, due to acute colitis, ileus or developing rectal obstruction  - Pain regimen in place  - Antiemetic in place  - NG tube removed 12/16   - Advance diet as tolerated      # Acute Kidney Injury   - Pre renal in nature  - On Maintenance Fluid LR 80ml/hr     # Hypokalemia  - History of hypokalemia  - Monitor and replete    # Anemia   - Hgb most recently 9.5 (Hgb 12.8 in April 2023)   - Iron labs ordered at request of patient     Chronic Medical Issues:  # Restless Leg Syndrome: Gabapentin reduce to 300 twice daily in the setting of COTY  # Alopecia: continue Spironolactone  # Hypothyroidism: Continue Levothyroxine  # Anxiety: Continue Wellbutrin 150mg daily      Access: PIV  Diet: Regular (easy to chew)   GI prophylaxis: PPI  DVT  Prophylaxis: Lovenox  Antibiotics: NONE  Code: Full      Disposition: Patient admitted for SBO. Nauseated today and has not had BM. Anticipate early DC tomorrow am.      Susana Blum DO

## 2023-12-18 NOTE — PROGRESS NOTES
12/18/23 0941   Discharge Planning   Living Arrangements Spouse/significant other   Support Systems Spouse/significant other   Assistance Needed A&Ox3, independent, drives   Type of Residence Private residence   Home or Post Acute Services None   Patient expects to be discharged to: Home no needs   Does the patient need discharge transport arranged? No

## 2023-12-19 ENCOUNTER — APPOINTMENT (OUTPATIENT)
Dept: GASTROENTEROLOGY | Facility: CLINIC | Age: 53
End: 2023-12-19
Payer: COMMERCIAL

## 2023-12-19 ENCOUNTER — PHARMACY VISIT (OUTPATIENT)
Dept: PHARMACY | Facility: CLINIC | Age: 53
End: 2023-12-19
Payer: MEDICAID

## 2023-12-19 ENCOUNTER — TELEPHONE (OUTPATIENT)
Dept: GASTROENTEROLOGY | Facility: CLINIC | Age: 53
End: 2023-12-19

## 2023-12-19 VITALS
RESPIRATION RATE: 18 BRPM | DIASTOLIC BLOOD PRESSURE: 71 MMHG | HEIGHT: 65 IN | BODY MASS INDEX: 22.33 KG/M2 | SYSTOLIC BLOOD PRESSURE: 109 MMHG | WEIGHT: 134 LBS | OXYGEN SATURATION: 98 % | HEART RATE: 78 BPM | TEMPERATURE: 97.7 F

## 2023-12-19 LAB
ALBUMIN SERPL BCP-MCNC: 3.2 G/DL (ref 3.4–5)
ANION GAP SERPL CALC-SCNC: 8 MMOL/L (ref 10–20)
BUN SERPL-MCNC: 9 MG/DL (ref 6–23)
CALCIUM SERPL-MCNC: 7.5 MG/DL (ref 8.6–10.3)
CARDIAC TROPONIN I PNL SERPL HS: <3 NG/L (ref 0–13)
CHLORIDE SERPL-SCNC: 110 MMOL/L (ref 98–107)
CO2 SERPL-SCNC: 28 MMOL/L (ref 21–32)
CREAT SERPL-MCNC: 0.72 MG/DL (ref 0.5–1.05)
ERYTHROCYTE [DISTWIDTH] IN BLOOD BY AUTOMATED COUNT: 12.5 % (ref 11.5–14.5)
FOLATE SERPL-MCNC: 22.8 NG/ML
GFR SERPL CREATININE-BSD FRML MDRD: >90 ML/MIN/1.73M*2
GLUCOSE SERPL-MCNC: 100 MG/DL (ref 74–99)
HCT VFR BLD AUTO: 27.9 % (ref 36–46)
HGB BLD-MCNC: 9.6 G/DL (ref 12–16)
HGB RETIC QN: 36 PG (ref 28–38)
IMMATURE RETIC FRACTION: 15.4 %
MAGNESIUM SERPL-MCNC: 1.59 MG/DL (ref 1.6–2.4)
MCH RBC QN AUTO: 32.1 PG (ref 26–34)
MCHC RBC AUTO-ENTMCNC: 34.4 G/DL (ref 32–36)
MCV RBC AUTO: 93 FL (ref 80–100)
NRBC BLD-RTO: 0 /100 WBCS (ref 0–0)
PHOSPHATE SERPL-MCNC: 2.5 MG/DL (ref 2.5–4.9)
PLATELET # BLD AUTO: 293 X10*3/UL (ref 150–450)
POTASSIUM SERPL-SCNC: 3.4 MMOL/L (ref 3.5–5.3)
RBC # BLD AUTO: 2.99 X10*6/UL (ref 4–5.2)
RETICS #: 0.05 X10*6/UL (ref 0.02–0.08)
RETICS/RBC NFR AUTO: 1.5 % (ref 0.5–2)
SODIUM SERPL-SCNC: 143 MMOL/L (ref 136–145)
TRANSFERRIN SERPL-MCNC: 176 MG/DL (ref 200–360)
VIT B12 SERPL-MCNC: 1153 PG/ML (ref 211–911)
WBC # BLD AUTO: 7.3 X10*3/UL (ref 4.4–11.3)

## 2023-12-19 PROCEDURE — RXMED WILLOW AMBULATORY MEDICATION CHARGE

## 2023-12-19 PROCEDURE — 99239 HOSP IP/OBS DSCHRG MGMT >30: CPT | Performed by: STUDENT IN AN ORGANIZED HEALTH CARE EDUCATION/TRAINING PROGRAM

## 2023-12-19 PROCEDURE — 2500000001 HC RX 250 WO HCPCS SELF ADMINISTERED DRUGS (ALT 637 FOR MEDICARE OP)

## 2023-12-19 PROCEDURE — 2500000004 HC RX 250 GENERAL PHARMACY W/ HCPCS (ALT 636 FOR OP/ED)

## 2023-12-19 PROCEDURE — 93010 ELECTROCARDIOGRAM REPORT: CPT | Performed by: INTERNAL MEDICINE

## 2023-12-19 PROCEDURE — 85045 AUTOMATED RETICULOCYTE COUNT: CPT

## 2023-12-19 PROCEDURE — 80069 RENAL FUNCTION PANEL: CPT

## 2023-12-19 PROCEDURE — 85027 COMPLETE CBC AUTOMATED: CPT

## 2023-12-19 PROCEDURE — 83735 ASSAY OF MAGNESIUM: CPT

## 2023-12-19 PROCEDURE — 36415 COLL VENOUS BLD VENIPUNCTURE: CPT

## 2023-12-19 PROCEDURE — 84484 ASSAY OF TROPONIN QUANT: CPT

## 2023-12-19 RX ORDER — POTASSIUM CHLORIDE 1.5 G/1.58G
40 POWDER, FOR SOLUTION ORAL ONCE
Status: COMPLETED | OUTPATIENT
Start: 2023-12-19 | End: 2023-12-19

## 2023-12-19 RX ORDER — NITROGLYCERIN 0.4 MG/1
0.4 TABLET SUBLINGUAL EVERY 5 MIN PRN
Status: DISCONTINUED | OUTPATIENT
Start: 2023-12-19 | End: 2023-12-19 | Stop reason: HOSPADM

## 2023-12-19 RX ORDER — KETOROLAC TROMETHAMINE 15 MG/ML
15 INJECTION, SOLUTION INTRAMUSCULAR; INTRAVENOUS ONCE
Status: COMPLETED | OUTPATIENT
Start: 2023-12-19 | End: 2023-12-19

## 2023-12-19 RX ORDER — POTASSIUM CHLORIDE 20 MEQ/1
20 TABLET, EXTENDED RELEASE ORAL DAILY
Qty: 7 TABLET | Refills: 0 | Status: SHIPPED | OUTPATIENT
Start: 2023-12-19 | End: 2023-12-19 | Stop reason: HOSPADM

## 2023-12-19 RX ORDER — ONDANSETRON 4 MG/1
4 TABLET, ORALLY DISINTEGRATING ORAL EVERY 8 HOURS PRN
Qty: 20 TABLET | Refills: 0 | Status: SHIPPED | OUTPATIENT
Start: 2023-12-19 | End: 2023-12-26

## 2023-12-19 RX ORDER — MAGNESIUM SULFATE HEPTAHYDRATE 40 MG/ML
2 INJECTION, SOLUTION INTRAVENOUS ONCE
Status: COMPLETED | OUTPATIENT
Start: 2023-12-19 | End: 2023-12-19

## 2023-12-19 RX ADMIN — ONDANSETRON 4 MG: 2 INJECTION INTRAMUSCULAR; INTRAVENOUS at 11:46

## 2023-12-19 RX ADMIN — PANCRELIPASE 2 CAPSULE: 36000; 180000; 114000 CAPSULE, DELAYED RELEASE PELLETS ORAL at 10:08

## 2023-12-19 RX ADMIN — MAGNESIUM SULFATE HEPTAHYDRATE 2 G: 2 INJECTION, SOLUTION INTRAVENOUS at 08:05

## 2023-12-19 RX ADMIN — BUPROPION HYDROCHLORIDE 150 MG: 150 TABLET, FILM COATED, EXTENDED RELEASE ORAL at 10:05

## 2023-12-19 RX ADMIN — PANTOPRAZOLE SODIUM 40 MG: 40 TABLET, DELAYED RELEASE ORAL at 10:16

## 2023-12-19 RX ADMIN — KETOROLAC TROMETHAMINE 15 MG: 15 INJECTION, SOLUTION INTRAMUSCULAR; INTRAVENOUS at 09:13

## 2023-12-19 RX ADMIN — NITROGLYCERIN 0.4 MG: 0.4 TABLET SUBLINGUAL at 07:27

## 2023-12-19 RX ADMIN — ACETAMINOPHEN 650 MG: 325 TABLET ORAL at 06:48

## 2023-12-19 RX ADMIN — POTASSIUM CHLORIDE 40 MEQ: 1.5 POWDER, FOR SOLUTION ORAL at 10:05

## 2023-12-19 RX ADMIN — LEVOTHYROXINE SODIUM 100 MCG: 100 TABLET ORAL at 05:15

## 2023-12-19 ASSESSMENT — COGNITIVE AND FUNCTIONAL STATUS - GENERAL
MOBILITY SCORE: 24
DAILY ACTIVITIY SCORE: 24

## 2023-12-19 ASSESSMENT — PAIN SCALES - GENERAL
PAINLEVEL_OUTOF10: 7
PAINLEVEL_OUTOF10: 7
PAINLEVEL_OUTOF10: 9
PAINLEVEL_OUTOF10: 10 - WORST POSSIBLE PAIN
PAINLEVEL_OUTOF10: 10 - WORST POSSIBLE PAIN

## 2023-12-19 ASSESSMENT — PAIN DESCRIPTION - LOCATION
LOCATION: HEAD
LOCATION: HEAD

## 2023-12-19 ASSESSMENT — PAIN - FUNCTIONAL ASSESSMENT
PAIN_FUNCTIONAL_ASSESSMENT: 0-10
PAIN_FUNCTIONAL_ASSESSMENT: 0-10

## 2023-12-19 NOTE — TELEPHONE ENCOUNTER
----- Message from Laureen Nixon MA sent at 12/19/2023 11:14 AM EST -----  Please call she's in the [facility] [phone]

## 2023-12-19 NOTE — CARE PLAN
Problem: Pain  Goal: My pain/discomfort is manageable  Outcome: Progressing     Problem: Safety  Goal: Patient will be injury free during hospitalization  Outcome: Progressing  Goal: I will remain free of falls  Outcome: Progressing     Problem: Daily Care  Goal: Daily care needs are met  Outcome: Progressing     Problem: Psychosocial Needs  Goal: Demonstrates ability to cope with hospitalization/illness  Outcome: Progressing  Goal: Collaborate with me, my family, and caregiver to identify my specific goals  Outcome: Progressing     Problem: Discharge Barriers  Goal: My discharge needs are met  Outcome: Progressing   The patient's goals for the shift include  being able to have a BM    The clinical goals for the shift include tolerate diet    Over the shift, the patient did make progress toward the following goals. Barriers to progression include pain.   Recommendations to address these barriers include medications, relaxation, and ambulation.   Patient tolerated ambulation to the bathroom this shift. Patient was able to void. Patient had a BM.  Patient was able to sleep at least 6 hours this shift, on and off. Patient tolerated diet this shift.

## 2023-12-19 NOTE — TELEPHONE ENCOUNTER
----- Message from Laureen Nixon MA sent at 12/19/2023 11:46 AM EST -----  Please call back [phone]

## 2023-12-19 NOTE — CONSULTS
Reason For Consult  Anemia    History Of Present Illness  Paulie Byrd is a 53 y.o. female with chronic pancreatitis, chronic diarrhea, hypothyroidism, gastroparesis, hx androgenic alopecia who presented for abx pain, found to have evidence of ileus and admitted for management of this. Heme onc consulted for acute on chronic anemia. Pt provided hx.   Pt has had daily diarrhea for 1 year, about 2-3 times a day. She has had chronic pancreatitis since her 20s per pt due to hx of alcohol use, has not used for years. Pt has seen her GI doctor Dr. Rasmussen for years. She had a colonoscopy 2/2023 (Dr. Rasmussen): No significant findings on colonoscopy itself, though there were medium hemorrhoids more prominent externally. Biopsies taken to rule out microscopic colitis, unable to view.   Pt has had intermittent blood on wiping, about once a month. It is dark red and not black. Denies recent blood in stool including this hospital stay. Denies any recent noticeable bleeding.   Reviewed labs:   6/2023 workup: celiac panel negative, HIV 1/2 screening negative. ESR and CRP wnl.   EBV IgG, IgM and nuclear antigen ab positive, suggestive of late acute infection.   Stool pathogen PCR negative.   Hgb baseline around 11 to 12. On admission, hgb 11.7. Has decreased to 10 and then around 9 after IV fluids.   12/15/23 CT abd pelvis: signs of ileus, colitis or rectal obstruction. Labs showed COTY likely prerenal, kidney function back to baseline 0.72 after fluids. Lipase WNL.   Other labs this hospital stay: B12 elevated, folate wnl, iron + TIBC 22% saturation, ferritin 62.     Allergies: codeine and grass pollen.   Family hx: per admission HPI   Surgical hx: includes hx cholecystectomy and hysterectomy   Social hx: hx alcohol use, no current use.     Past Medical History  She has a past medical history of Bilateral primary osteoarthritis of knee (01/19/2016), Chronic pain syndrome (06/06/2018), Chronic pancreatitis (CMS/HCC),  "Contusion of lower back and pelvis, subsequent encounter (09/07/2016), Encounter for other preprocedural examination (12/02/2016), Encounter for other preprocedural examination (01/19/2016), Graves disease, Other abnormality of red blood cells (06/23/2015), Other tear of medial meniscus, current injury, right knee, subsequent encounter (01/19/2016), Pain in right knee (01/19/2016), Personal history of other diseases of the musculoskeletal system and connective tissue (11/11/2015), Personal history of other diseases of the musculoskeletal system and connective tissue (12/29/2014), Personal history of other endocrine, nutritional and metabolic disease (06/06/2018), Personal history of other specified conditions (12/11/2014), Personal history of other specified conditions (04/22/2015), Unilateral primary osteoarthritis, left knee (12/02/2016), and Unilateral primary osteoarthritis, right knee (03/04/2016).    Surgical History  She has a past surgical history that includes Other surgical history (06/27/2014); Cholecystectomy (06/27/2014); Hysterectomy (06/27/2014); Adenoidectomy (06/27/2014); Other surgical history (09/23/2021); and Other surgical history (09/23/2021).     Social History  She reports that she has quit smoking. She has never used smokeless tobacco. She reports that she does not currently use alcohol. She reports that she does not currently use drugs after having used the following drugs: \"Crack\" cocaine.    Family History  Family History   Problem Relation Name Age of Onset    Allergies Other          Allergies  Codeine and Grass pollen    Review of Systems  Positive for diarrhea, abd pain, blood on wiping.   Other ROS negative except as above and in HPI.      Physical Exam  Constitutional: No acute distress.   Eyes: clear sclera, good eye contact   Head: atraumatic, normocephalic  Lungs: CTA b/l, no wheezes, rales, or crackles.    Heart: RRR, no murmurs, rubs, or gallops  Abdomen: mild diffuse tender to " "palpation, + BS  MSK: no edema, bruising, or tenderness to BLEs.   Neuro: moving all extremities. answering questions, following commands  Skin: warm and dry with no rash  Psych: appropriate mood and behavior    Last Recorded Vitals  Blood pressure 109/71, pulse 78, temperature 36.5 °C (97.7 °F), resp. rate 18, height 1.651 m (5' 5\"), weight 60.8 kg (134 lb), SpO2 98 %.    Relevant Results  Scheduled medications  albuterol, 2 puff, inhalation, q4h  bisacodyl, 10 mg, rectal, Daily  buPROPion XL, 150 mg, oral, q AM  enoxaparin, 40 mg, subcutaneous, q24h  gabapentin, 1,200 mg, nasogastric tube, Nightly  levothyroxine, 100 mcg, oral, Daily before breakfast  pancrelipase (Lip-Prot-Amyl), 2 capsule, oral, TID  pantoprazole, 40 mg, oral, Daily  [Held by provider] spironolactone, 100 mg, nasogastric tube, Daily      Continuous medications     PRN medications  PRN medications: acetaminophen, HYDROmorphone, HYDROmorphone, nitroglycerin, ondansetron, phenoL    Results for orders placed or performed during the hospital encounter of 12/15/23 (from the past 24 hour(s))   CBC   Result Value Ref Range    WBC 7.3 4.4 - 11.3 x10*3/uL    nRBC 0.0 0.0 - 0.0 /100 WBCs    RBC 2.99 (L) 4.00 - 5.20 x10*6/uL    Hemoglobin 9.6 (L) 12.0 - 16.0 g/dL    Hematocrit 27.9 (L) 36.0 - 46.0 %    MCV 93 80 - 100 fL    MCH 32.1 26.0 - 34.0 pg    MCHC 34.4 32.0 - 36.0 g/dL    RDW 12.5 11.5 - 14.5 %    Platelets 293 150 - 450 x10*3/uL   Renal function panel   Result Value Ref Range    Glucose 100 (H) 74 - 99 mg/dL    Sodium 143 136 - 145 mmol/L    Potassium 3.4 (L) 3.5 - 5.3 mmol/L    Chloride 110 (H) 98 - 107 mmol/L    Bicarbonate 28 21 - 32 mmol/L    Anion Gap 8 (L) 10 - 20 mmol/L    Urea Nitrogen 9 6 - 23 mg/dL    Creatinine 0.72 0.50 - 1.05 mg/dL    eGFR >90 >60 mL/min/1.73m*2    Calcium 7.5 (L) 8.6 - 10.3 mg/dL    Phosphorus 2.5 2.5 - 4.9 mg/dL    Albumin 3.2 (L) 3.4 - 5.0 g/dL   Magnesium   Result Value Ref Range    Magnesium 1.59 (L) 1.60 - 2.40 " mg/dL   Troponin I, High Sensitivity   Result Value Ref Range    Troponin I, High Sensitivity <3 0 - 13 ng/L   Reticulocytes   Result Value Ref Range    Retic % 1.5 0.5 - 2.0 %    Retic Absolute 0.046 0.018 - 0.083 x10*6/uL    Reticulocyte Hemoglobin 36 28 - 38 pg    Immature Retic fraction 15.4 <=16.0 %       XR abdomen 1 view    Result Date: 12/16/2023  Interpreted By:  Rajesh Zaidi, STUDY: XR ABDOMEN 1 VIEW;  12/15/2023 9:22 pm   INDICATION: Signs/Symptoms:confirmation of NG tube post advancement.   COMPARISON: Earlier on 12/15/2023   ACCESSION NUMBER(S): [id number]   ORDERING CLINICIAN: ANAYELI DAVENPORT   FINDINGS: Nasogastric tube has been slightly advanced and tip now terminates in the left upper abdomen. The side opening is just distal to the gastroesophageal junction. There is redemonstration of gaseous distention of bowel throughout the image abdomen.   Mild left basilar atelectasis.       Nasogastric tube has been slightly advanced and tip terminates in the left upper abdomen at the level of the proximal stomach. The side opening is now located just distal to the level of the gastroesophageal junction.   MACRO: None   Signed by: Rajesh Zaidi 12/16/2023 12:56 AM Dictation workstation:   [id number]    XR abdomen 1 view    Result Date: 12/15/2023  Interpreted By:  Nolberto Camacho, STUDY: XR ABDOMEN 1 VIEW;  12/15/2023 6:23 pm   INDICATION: Signs/Symptoms:Need to confirm NG tube placement/please do not complete until the NG tube is placed.   COMPARISON: None.   ACCESSION NUMBER(S): [id number]   ORDERING CLINICIAN: PEPPER WILDE   FINDINGS: Abdomen, single view   NG tube tip projects over the gastric body. The side hole is at the level of GE junction however.       1.   NG tube tip over the gastric body with side hole at the level of the GE junction. Mild advancement can be performed.   MACRO: None   Signed by: Nolberto Camacho 12/15/2023 6:39 PM Dictation workstation:   [id number]    CT abdomen pelvis w IV  contrast    Result Date: 12/15/2023  Interpreted By:  Delvis Fitzpatrick, STUDY: CT ABDOMEN PELVIS W IV CONTRAST;  12/15/2023 4:40 pm   INDICATION: Signs/Symptoms:concern for obstruction with abnormal kub two days ago and abdominal pain and n/v.   COMPARISON: 06/21/2023   ACCESSION NUMBER(S): [id number]   ORDERING CLINICIAN: PEPPER WILDE   TECHNIQUE: CT of the abdomen and pelvis was performed.  Standard contiguous axial images were obtained at 3 mm slice thickness through the abdomen and pelvis. Coronal and sagittal reconstructions at 3 mm slice thickness were performed.   75 ml of contrast Omnipaque 350 were administered intravenously without immediate complication.   FINDINGS: Lung bases clear.   Unremarkable liver, spleen, pancreas, and adrenals. Small cortical cysts in the bilateral kidneys redemonstrated, measuring up to 1.1 cm.   Status post cholecystectomy with associated stable mild CBD dilatation..   Normal appendix.   Bowel loops nonobstructed. Multiple fluid-filled and dilated colonic loops are noted, due to acute colitis, ileus, or developing rectal obstruction.   No free air or free fluid.   No abdominal aortic aneurysm. Mild vascular calcification.   No adenopathy.   Sections through the pelvis demonstrate a grossly unremarkable urinary bladder. Status post hysterectomy. No adnexal mass.   No acute osseous abnormality in the regional skeleton. Mild multilevel lumbar spondylosis. Mild diffuse osteopenia       1. Multiple fluid-filled and dilated colonic loops, due to acute colitis, ileus or developing rectal obstruction. 2. No other acute abnormality in the abdomen and pelvis. 3. Normal appendix   Signed by: Delvis Fitzpatrick 12/15/2023 5:39 PM Dictation workstation:   [id number]    XR abdomen 1 view    Result Date: 12/14/2023  Interpreted By:  Albert Scott, STUDY: XR ABDOMEN 1 VIEW;  12/13/2023 1:54 pm   INDICATION: Signs/Symptoms:abd pain, constipation - r/o fecal retention.   COMPARISON: CT abdomen and  pelvis with contrast 21 June 2023   ACCESSION NUMBER(S): [id number]   ORDERING CLINICIAN: VIDHI BENNETT   TECHNIQUE: Frontal supine view of the abdomen   FINDINGS: Allowing for the different modality, today's KUB is quite reminiscent of the CT from 21 June 2023, with large amounts of gas in dilated segments of colon   Little if any demonstrable stool       As above   MACRO: None   Signed by: Albert Scott 12/14/2023 9:49 AM Dictation workstation:   [id number]        Assessment/Plan     53f with chronic pancreatitis, chronic diarrhea, hypothyroidism, gastroparesis, hx androgenic alopecia who presented for abx pain, found to have evidence of ileus and admitted for management of this. Heme onc consulted for acute on chronic anemia.    #acute on chronic anemia  -acute aspect: suspect dilutional from IVF given. Received 2L IF in ED and IVF throughout stay. Pt denies obvious bleeding this admission.   -chronic anemia: reviewed labs including likely mono infection 6/2023. Less likely MAMADOU as ferritin wnl and % saturation > 20. reticulocyte 1.5%, reticulocyte index 0.66 suggestive of hypoproliferation. Multiple causes of chronic anemia possible and discussed these with pt.   -no indication for transfusion or acute intervention in hospital. Follow up with PCP for monitoring of hgb and further workup / referral to heme onc outpatient as needed. Discussed plan with pt who is agreeable.     #chronic diarrhea  -etiologies may include chronic pancreatitis, possible microscopic colitis (no path result available from 2/2023 colonoscopy). Reviewed meds with pt. Follow up with PCP / GI doctor for review of colonoscopy path results / further workup as needed, discussed with pt.     Discussed with Dr. Villalobos.     Samaria Valverde DO

## 2023-12-19 NOTE — TELEPHONE ENCOUNTER
Phone - being discharged from [facility] - abd less distended - Hg decreased, but no clinical evidence of bleeding - will get CBC, BMP drawn tomorrow

## 2023-12-19 NOTE — CARE PLAN
The patient's goals for the shift include  manage headache    The clinical goals for the shift include Patient will tolerate diet    Over the shift, the patient did not make progress toward the following goals. Barriers to progression include headache and pain. Recommendations to address these barriers include support and medication.

## 2023-12-19 NOTE — DISCHARGE SUMMARY
Discharge Diagnosis  Ileus (CMS/HCC)  Anemia     Issues Requiring Follow-Up  Repeat CBC and BMP in 1 week for hypokalemia and anemia   Follow up with Heme/Onc   Follow up with General Surgery     Discharge Meds     Your medication list        START taking these medications        Instructions Last Dose Given Next Dose Due   ondansetron ODT 4 mg disintegrating tablet  Commonly known as: Zofran-ODT      Take 1 tablet (4 mg) by mouth every 8 hours if needed for nausea or vomiting for up to 7 days.              CHANGE how you take these medications        Instructions Last Dose Given Next Dose Due   buPROPion  mg 24 hr tablet  Commonly known as: Wellbutrin XL  What changed:   additional instructions  Another medication with the same name was removed. Continue taking this medication, and follow the directions you see here.      Take 1 tablet (150 mg) by mouth once daily in the morning. Do not crush, chew, or split.       gabapentin 300 mg capsule  Commonly known as: Neurontin  What changed:   how much to take  how to take this  when to take this  additional instructions      TAKE 1 CAPSULE BY MOUTH EVERY 8 HOURS AND 1 ADDITIONAL CAPSULE AT BEDTIME       spironolactone 100 mg tablet  Commonly known as: Aldactone  What changed:   how much to take  how to take this  when to take this      Take one pill by mouth daily              CONTINUE taking these medications        Instructions Last Dose Given Next Dose Due   levothyroxine 100 mcg tablet  Commonly known as: Synthroid, Levoxyl      Take 1 tablet (100 mcg) by mouth once daily in the morning. Take before meals.       Zenpep 40,000-126,000- 168,000 unit capsule  Generic drug: lipase-protease-amylase                  STOP taking these medications      ADULTS MULTIVITAMIN ORAL        atorvastatin 40 mg tablet  Commonly known as: Lipitor        Claritin 10 mg tablet  Generic drug: loratadine        diphenhydrAMINE 25 mg capsule  Commonly known as: BENADryl         fluconazole 150 mg tablet  Commonly known as: Diflucan        fluticasone 50 mcg/actuation nasal spray  Commonly known as: Flonase        ibuprofen 200 mg tablet        omeprazole 20 mg DR capsule  Commonly known as: PriLOSEC        topiramate 50 mg tablet  Commonly known as: Topamax                  Where to Get Your Medications        These medications were sent to [facility]  [id number], [place] [address]      Hours: 9 AM to 5 PM Mon-Fri Phone: [phone]   ondansetron ODT 4 mg disintegrating tablet         Test Results Pending At Discharge  Pending Labs       No current pending labs.            Hospital Course  HPI: Paulie Byrd is a 53 y.o. female presenting with with past medical history of Chronic Pancreatitis, Hypothyroidism, Gastroparesis, Alopecia present to the e.d complaining of worsening abdominal pain. Patient states that pain started on Monday and has progressively gotten worse. Pain is located at right upper quadrant, non radiating and rate it 10/10. Today she took a lot of tylenol and motrin but did not notice any improvement. She states that she has a bowel movement this morning, it was liquid in nature. Patient does not remember if she has pass gas yet. She denies fever, feeling, nausea, vomiting, chest pain, leg pain or swelling      Hospital Course:   Patient was admitted for colonic ileus was placed on n.p.o.  NG tube in place.  Showed acute kidney injury likely secondary to prerenal azotemia for which she was given IV fluids.  Spironolactone which was taken for alopecia was also held as her blood pressure was soft at times.  Surgery was consulted who did not recommend emergent abdominal surgery at this time.  12/16/2023 mg tube was removed and diet advanced to clear liquid diet. Patient's diet was advanced as tolerated and she did have a small bowel movement.  On the morning of 12/19, patient was experiencing 7/10 chest pain that was non-radiating in nature.  EKG  showed normal sinus rhythm, non-ischemic and troponin was negative. Pain relieved with sublingual nitroglycerin. Heme/Onc consulted requested per patient for anemia with Hgb of 9.6 and recommended outpatient follow up. Patient medically stable and discharged home on 12/19/23. Sent with script of Zofran PRN. Follow ups to be scheduled with PCP, general surgery, and heme/onc on discharge. Instructed to repeat CBC and BMP within one week to check potassium and Hgb levels.       Pertinent Physical Exam At Time of Discharge  Constitutional:       Appearance: Normal appearance.   Cardiovascular:      Rate and Rhythm: Normal rate and regular rhythm.      Heart sounds: Normal heart sounds. No murmur heard.     No friction rub. No gallop.   Pulmonary:      Effort: Pulmonary effort is normal.      Breath sounds: Normal breath sounds. No wheezing or rhonchi.   Abdominal:      General: Bowel sounds are normal. There is no distension.      Palpations: Abdomen is soft.      Tenderness: There is no abdominal tenderness.   Musculoskeletal:         General: No swelling.   Skin:     General: Skin is warm and dry.      Findings: No bruising or rash.   Neurological:      General: No focal deficit present.      Mental Status: She is alert and oriented to person, place, and time.   Psychiatric:         Mood and Affect: Mood normal.         Thought Content: Thought content normal.         Judgment: Judgment normal.        Outpatient Follow-Up  Future Appointments   Date Time Provider Department Center   12/19/2023  3:00 PM Kyree Storm MD [id number] Tonio Blum DO

## 2023-12-20 ENCOUNTER — LAB (OUTPATIENT)
Dept: LAB | Facility: LAB | Age: 53
End: 2023-12-20
Payer: COMMERCIAL

## 2023-12-20 DIAGNOSIS — K56.7 ILEUS (MULTI): ICD-10-CM

## 2023-12-20 LAB
ANION GAP SERPL CALC-SCNC: 11 MMOL/L (ref 10–20)
BUN SERPL-MCNC: 11 MG/DL (ref 6–23)
CALCIUM SERPL-MCNC: 9.2 MG/DL (ref 8.6–10.6)
CHLORIDE SERPL-SCNC: 109 MMOL/L (ref 98–107)
CO2 SERPL-SCNC: 30 MMOL/L (ref 21–32)
CREAT SERPL-MCNC: 0.71 MG/DL (ref 0.5–1.05)
ERYTHROCYTE [DISTWIDTH] IN BLOOD BY AUTOMATED COUNT: 12.4 % (ref 11.5–14.5)
GFR SERPL CREATININE-BSD FRML MDRD: >90 ML/MIN/1.73M*2
GLUCOSE SERPL-MCNC: 95 MG/DL (ref 74–99)
HCT VFR BLD AUTO: 32.2 % (ref 36–46)
HGB BLD-MCNC: 10.9 G/DL (ref 12–16)
MCH RBC QN AUTO: 32.2 PG (ref 26–34)
MCHC RBC AUTO-ENTMCNC: 33.9 G/DL (ref 32–36)
MCV RBC AUTO: 95 FL (ref 80–100)
NRBC BLD-RTO: 0 /100 WBCS (ref 0–0)
PLATELET # BLD AUTO: 348 X10*3/UL (ref 150–450)
POTASSIUM SERPL-SCNC: 4.6 MMOL/L (ref 3.5–5.3)
RBC # BLD AUTO: 3.38 X10*6/UL (ref 4–5.2)
SODIUM SERPL-SCNC: 145 MMOL/L (ref 136–145)
WBC # BLD AUTO: 8 X10*3/UL (ref 4.4–11.3)

## 2023-12-20 PROCEDURE — 85027 COMPLETE CBC AUTOMATED: CPT

## 2023-12-20 PROCEDURE — 80048 BASIC METABOLIC PNL TOTAL CA: CPT

## 2023-12-20 PROCEDURE — 36415 COLL VENOUS BLD VENIPUNCTURE: CPT

## 2023-12-21 ENCOUNTER — PATIENT OUTREACH (OUTPATIENT)
Dept: CARE COORDINATION | Facility: CLINIC | Age: 53
End: 2023-12-21
Payer: COMMERCIAL

## 2023-12-21 NOTE — PROGRESS NOTES
Outreach call to patient to support a smooth transition of care from recent admission.  Spoke with patient, reviewed discharge medications and instructions, follow up appointments are made. Will continue to monitor through transition period.

## 2023-12-21 NOTE — SIGNIFICANT EVENT
Follow Up Phone Call    Outgoing phone call    Spoke to: Paulie Byrd Relationship:self   Phone number: [phone]      Outcome: contacted patient/ family   Chief Complaint   Patient presents with    Constipation          Diagnosis:Not applicable      States she is feeling better. No further questions or concerns.

## 2024-01-09 ENCOUNTER — HOSPITAL ENCOUNTER (OUTPATIENT)
Dept: CARDIOLOGY | Facility: HOSPITAL | Age: 54
Discharge: HOME | End: 2024-01-09
Payer: COMMERCIAL

## 2024-01-09 PROCEDURE — 93005 ELECTROCARDIOGRAM TRACING: CPT

## 2024-01-15 ENCOUNTER — APPOINTMENT (OUTPATIENT)
Dept: SURGERY | Facility: CLINIC | Age: 54
End: 2024-01-15
Payer: COMMERCIAL

## 2024-01-23 ENCOUNTER — NUTRITION (OUTPATIENT)
Dept: HEMATOLOGY/ONCOLOGY | Facility: CLINIC | Age: 54
End: 2024-01-23
Payer: COMMERCIAL

## 2024-01-23 ENCOUNTER — OFFICE VISIT (OUTPATIENT)
Dept: HEMATOLOGY/ONCOLOGY | Facility: CLINIC | Age: 54
End: 2024-01-23
Payer: COMMERCIAL

## 2024-01-23 VITALS
TEMPERATURE: 97.5 F | WEIGHT: 133.38 LBS | HEIGHT: 66 IN | OXYGEN SATURATION: 97 % | SYSTOLIC BLOOD PRESSURE: 96 MMHG | BODY MASS INDEX: 21.44 KG/M2 | DIASTOLIC BLOOD PRESSURE: 62 MMHG | RESPIRATION RATE: 16 BRPM | HEART RATE: 97 BPM

## 2024-01-23 VITALS — WEIGHT: 133.38 LBS | BODY MASS INDEX: 21.44 KG/M2 | HEIGHT: 66 IN

## 2024-01-23 DIAGNOSIS — K56.7 ILEUS (MULTI): ICD-10-CM

## 2024-01-23 DIAGNOSIS — L64.9 ANDROGENETIC ALOPECIA: ICD-10-CM

## 2024-01-23 DIAGNOSIS — D50.0 IRON DEFICIENCY ANEMIA DUE TO CHRONIC BLOOD LOSS: Primary | ICD-10-CM

## 2024-01-23 DIAGNOSIS — K90.9 IDIOPATHIC STEATORRHEA (HHS-HCC): ICD-10-CM

## 2024-01-23 LAB
ALBUMIN SERPL BCP-MCNC: 4.4 G/DL (ref 3.4–5)
ALP SERPL-CCNC: 53 U/L (ref 33–110)
ALT SERPL W P-5'-P-CCNC: 16 U/L (ref 7–45)
ANION GAP SERPL CALC-SCNC: 18 MMOL/L (ref 10–20)
AST SERPL W P-5'-P-CCNC: 14 U/L (ref 9–39)
BASOPHILS # BLD AUTO: 0.01 X10*3/UL (ref 0–0.1)
BASOPHILS NFR BLD AUTO: 0.2 %
BILIRUB SERPL-MCNC: 0.3 MG/DL (ref 0–1.2)
BUN SERPL-MCNC: 12 MG/DL (ref 6–23)
CALCIUM SERPL-MCNC: 9.1 MG/DL (ref 8.6–10.3)
CHLORIDE SERPL-SCNC: 102 MMOL/L (ref 98–107)
CO2 SERPL-SCNC: 23 MMOL/L (ref 21–32)
CREAT SERPL-MCNC: 0.92 MG/DL (ref 0.5–1.05)
EGFRCR SERPLBLD CKD-EPI 2021: 74 ML/MIN/1.73M*2
EOSINOPHIL # BLD AUTO: 0.07 X10*3/UL (ref 0–0.7)
EOSINOPHIL NFR BLD AUTO: 1.4 %
ERYTHROCYTE [DISTWIDTH] IN BLOOD BY AUTOMATED COUNT: 12.9 % (ref 11.5–14.5)
FERRITIN SERPL-MCNC: 54 NG/ML (ref 8–150)
FOLATE SERPL-MCNC: >24 NG/ML
GLUCOSE SERPL-MCNC: 104 MG/DL (ref 74–99)
HAV AB SER QL IA: REACTIVE
HBV CORE AB SER QL: NONREACTIVE
HBV SURFACE AB SER-ACNC: 26.8 MIU/ML
HBV SURFACE AG SERPL QL IA: NONREACTIVE
HCT VFR BLD AUTO: 32.8 % (ref 36–46)
HCV AB SER QL: NONREACTIVE
HGB BLD-MCNC: 11.2 G/DL (ref 12–16)
HGB RETIC QN: 36 PG (ref 28–38)
IGA SERPL-MCNC: 170 MG/DL (ref 70–400)
IGG SERPL-MCNC: 600 MG/DL (ref 700–1600)
IGM SERPL-MCNC: 63 MG/DL (ref 40–230)
IMM GRANULOCYTES # BLD AUTO: 0.01 X10*3/UL (ref 0–0.7)
IMM GRANULOCYTES NFR BLD AUTO: 0.2 % (ref 0–0.9)
IMMATURE RETIC FRACTION: 7.9 %
IRON SATN MFR SERPL: 52 %
IRON SERPL-MCNC: 161 UG/DL (ref 35–150)
LDH SERPL L TO P-CCNC: 169 U/L (ref 84–246)
LYMPHOCYTES # BLD AUTO: 1.35 X10*3/UL (ref 1.2–4.8)
LYMPHOCYTES NFR BLD AUTO: 26.3 %
MCH RBC QN AUTO: 31.7 PG (ref 26–34)
MCHC RBC AUTO-ENTMCNC: 34.1 G/DL (ref 32–36)
MCV RBC AUTO: 93 FL (ref 80–100)
MONOCYTES # BLD AUTO: 0.23 X10*3/UL (ref 0.1–1)
MONOCYTES NFR BLD AUTO: 4.5 %
NEUTROPHILS # BLD AUTO: 3.46 X10*3/UL (ref 1.2–7.7)
NEUTROPHILS NFR BLD AUTO: 67.4 %
NRBC BLD-RTO: ABNORMAL /100{WBCS}
PLATELET # BLD AUTO: 307 X10*3/UL (ref 150–450)
POTASSIUM SERPL-SCNC: 3.2 MMOL/L (ref 3.5–5.3)
PROT SERPL-MCNC: 6.4 G/DL (ref 6.4–8.2)
PROT SERPL-MCNC: 6.6 G/DL (ref 6.4–8.2)
RBC # BLD AUTO: 3.53 X10*6/UL (ref 4–5.2)
RETICS #: 0.06 X10*6/UL (ref 0.02–0.08)
RETICS/RBC NFR AUTO: 1.5 % (ref 0.5–2)
SODIUM SERPL-SCNC: 140 MMOL/L (ref 136–145)
TIBC SERPL-MCNC: 311 UG/DL
UIBC SERPL-MCNC: 150 UG/DL (ref 110–370)
VIT B12 SERPL-MCNC: 1029 PG/ML (ref 211–911)
WBC # BLD AUTO: 5.1 X10*3/UL (ref 4.4–11.3)

## 2024-01-23 PROCEDURE — 82728 ASSAY OF FERRITIN: CPT | Performed by: PHYSICIAN ASSISTANT

## 2024-01-23 PROCEDURE — 83540 ASSAY OF IRON: CPT | Performed by: PHYSICIAN ASSISTANT

## 2024-01-23 PROCEDURE — 84165 PROTEIN E-PHORESIS SERUM: CPT | Performed by: PHYSICIAN ASSISTANT

## 2024-01-23 PROCEDURE — 84075 ASSAY ALKALINE PHOSPHATASE: CPT | Performed by: PHYSICIAN ASSISTANT

## 2024-01-23 PROCEDURE — 83615 LACTATE (LD) (LDH) ENZYME: CPT | Performed by: PHYSICIAN ASSISTANT

## 2024-01-23 PROCEDURE — 82607 VITAMIN B-12: CPT | Mod: GEALAB | Performed by: PHYSICIAN ASSISTANT

## 2024-01-23 PROCEDURE — 86706 HEP B SURFACE ANTIBODY: CPT | Mod: GEALAB | Performed by: PHYSICIAN ASSISTANT

## 2024-01-23 PROCEDURE — 83521 IG LIGHT CHAINS FREE EACH: CPT | Mod: GEALAB | Performed by: PHYSICIAN ASSISTANT

## 2024-01-23 PROCEDURE — 99215 OFFICE O/P EST HI 40 MIN: CPT | Performed by: PHYSICIAN ASSISTANT

## 2024-01-23 PROCEDURE — 36415 COLL VENOUS BLD VENIPUNCTURE: CPT | Performed by: PHYSICIAN ASSISTANT

## 2024-01-23 PROCEDURE — 86704 HEP B CORE ANTIBODY TOTAL: CPT | Mod: GEALAB | Performed by: PHYSICIAN ASSISTANT

## 2024-01-23 PROCEDURE — 83010 ASSAY OF HAPTOGLOBIN QUANT: CPT | Performed by: PHYSICIAN ASSISTANT

## 2024-01-23 PROCEDURE — 86320 SERUM IMMUNOELECTROPHORESIS: CPT | Performed by: PHYSICIAN ASSISTANT

## 2024-01-23 PROCEDURE — 84155 ASSAY OF PROTEIN SERUM: CPT | Mod: 59,GEALAB | Performed by: PHYSICIAN ASSISTANT

## 2024-01-23 PROCEDURE — 86803 HEPATITIS C AB TEST: CPT | Mod: GEALAB | Performed by: PHYSICIAN ASSISTANT

## 2024-01-23 PROCEDURE — 82784 ASSAY IGA/IGD/IGG/IGM EACH: CPT | Mod: GEALAB | Performed by: PHYSICIAN ASSISTANT

## 2024-01-23 PROCEDURE — 86708 HEPATITIS A ANTIBODY: CPT | Mod: GEALAB | Performed by: PHYSICIAN ASSISTANT

## 2024-01-23 PROCEDURE — 82746 ASSAY OF FOLIC ACID SERUM: CPT | Mod: GEALAB | Performed by: PHYSICIAN ASSISTANT

## 2024-01-23 PROCEDURE — 99205 OFFICE O/P NEW HI 60 MIN: CPT | Performed by: PHYSICIAN ASSISTANT

## 2024-01-23 PROCEDURE — 86334 IMMUNOFIX E-PHORESIS SERUM: CPT | Mod: GEALAB | Performed by: PHYSICIAN ASSISTANT

## 2024-01-23 PROCEDURE — 1036F TOBACCO NON-USER: CPT | Performed by: PHYSICIAN ASSISTANT

## 2024-01-23 PROCEDURE — 85025 COMPLETE CBC W/AUTO DIFF WBC: CPT | Performed by: PHYSICIAN ASSISTANT

## 2024-01-23 PROCEDURE — 87340 HEPATITIS B SURFACE AG IA: CPT | Mod: GEALAB | Performed by: PHYSICIAN ASSISTANT

## 2024-01-23 PROCEDURE — 85045 AUTOMATED RETICULOCYTE COUNT: CPT | Performed by: PHYSICIAN ASSISTANT

## 2024-01-23 RX ORDER — SPIRONOLACTONE 100 MG/1
TABLET, FILM COATED ORAL
Qty: 30 TABLET | Refills: 3 | Status: SHIPPED | OUTPATIENT
Start: 2024-01-23 | End: 2024-05-07

## 2024-01-23 RX ORDER — POTASSIUM CHLORIDE 20 MEQ/1
40 TABLET, EXTENDED RELEASE ORAL 3 TIMES DAILY
COMMUNITY
End: 2024-04-22 | Stop reason: SDUPTHER

## 2024-01-23 ASSESSMENT — PAIN SCALES - GENERAL: PAINLEVEL: 0-NO PAIN

## 2024-01-23 NOTE — PATIENT INSTRUCTIONS
Encouraged intake of small frequent meals with compliance of enzymes  - recommend for pt to reach out to GI regarding enzyme dosage as pt is not eating enough for 80,000 lipase units; a lower dose may be helpful  - reviewed taking enzymes, take a bite then 1 enzyme and then eat, take second enzyme half way through meal.    Will have samples of Cassie Concepcion sent to pt for trial; if tolerates, would recommend KF 1.4 standard = 455kcal, 20g protein per carton    Pt would likely benefit from referral/ follow-up with general outpatient dietitian

## 2024-01-23 NOTE — PROGRESS NOTES
"NUTRITION Assessment NOTE    Nutrition Assessment     Reason for Visit:  Paulie Byrd is a 53 y.o. female who presents for iron deficiency anemia.     Requested by PA to see pt due to reports of weight loss, inability to eat.   Visited with pt today.     Appears pt has a GI history, followed by Dr. Storm? Per pt report; has also been seen by her PCP for bloody diarrhea in the past.   Hx: Graves disease, HLD, gastroparesis, EPI on PERT due to reported chronic pancreatitis, recent hospital stay for ileus/ obstruction     Lab Results   Component Value Date/Time    GLUCOSE 104 (H) 01/23/2024 1129     01/23/2024 1129    K 3.2 (L) 01/23/2024 1129     01/23/2024 1129    CO2 23 01/23/2024 1129    ANIONGAP 18 01/23/2024 1129    BUN 12 01/23/2024 1129    CREATININE 0.92 01/23/2024 1129    EGFR 74 01/23/2024 1129    CALCIUM 9.1 01/23/2024 1129    ALBUMIN 4.4 01/23/2024 1129    ALKPHOS 53 01/23/2024 1129    PROT 6.6 01/23/2024 1129    AST 14 01/23/2024 1129    BILITOT 0.3 01/23/2024 1129    ALT 16 01/23/2024 1129     Lab Results   Component Value Date/Time    VITD25 71 05/09/2023 1142       Anthropometrics:  Anthropometrics  Height: 167.1 cm (5' 5.79\")  Weight: 60.5 kg (133 lb 6.1 oz)  BMI (Calculated): 21.67  Weight Change  Weight History / % Weight Change: Reports continued weight loss with continued issues with diarrhea, nausea with eating. 11% weight loss noted in the last 8-9 months, noted but not significant  Significant Weight Loss: No        Wt Readings from Last 10 Encounters:   01/23/24 60.5 kg (133 lb 6.1 oz)   01/23/24 60.5 kg (133 lb 6.1 oz)   12/15/23 60.8 kg (134 lb)   06/29/23 64.5 kg (142 lb 3.2 oz)   05/09/23 68 kg (150 lb)   01/17/23 74.8 kg (165 lb)   06/24/22 88.5 kg (195 lb)   04/22/22 89.4 kg (197 lb)   09/23/21 86.9 kg (191 lb 8 oz)   06/21/21 86.2 kg (190 lb)        Food And Nutrient Intake:  Food and Nutrient History  Food and Nutrient History: Pt reports that she has had issues with " "eating for a while- eating causes nausea/ pain and so she doesn't eat. She follows with GI and was placed on PERT for EPI, zenpep 40,000 units, 2 with meals. Pt reports not taking with every meal. Pt with recent hospitalization for ileus- also with a hx of gastroparesis.  Energy Intake: Poor < 50 %  Fluid Intake: water (1-2 bottles a day, drinks more in the winter) ginger ale  Food Allergy: -  GI Symptoms: nausea, diarrhea, anorexia  GI Symptoms greater than 2 weeks: yes     Food Intake  Amount of Food: Barely eats 1 meal a day- feels nauseated after eating; feels that she may have food aversion given extent of time dealing with difficulty eating.  Meal 3: 1/2 BLT sandwich, 2 bites of pizza                                            Food Supplement Intake  Oral Nutrition Supplements: Ensure (takes a long time to get down)    Medication and Complementary/Alternative Medicine Use  Misuse of Medication: hx of alcohol and substance abuse/ use      Nutrition Focused Physical Exam Findings:      Subcutaneous Fat Loss  Orbital Fat Pads: Severe (dark circles, hollowing and loose skin)  Buccal Fat Pads: Mild-Moderate (flat cheeks, minimal bounce)  Triceps: Defer  Ribs: Defer    Muscle Wasting  Temporalis: Mild-Moderate (slight depression)  Pectoralis (Clavicular Region): Severe (protruding prominent clavicle)  Deltoid/Trapezius: Defer  Interosseous: Severe (depressed area between thumb and forefinger)  Trapezius/Infraspinatus/Supraspinatus (Scapular Region): Defer  Quadriceps: Defer  Gastrocnemius: Defer              Energy Needs  Calculated Energy Needs Using Equations  Height: 167.1 cm (5' 5.79\")  Weight Used for Equation Calculations: 60.5 kg (133 lb 6.1 oz)  Wendi- [place] Equation (Overweight or Obese Patients): 1218  Estimated Energy Needs  Total Energy Estimated Needs (kCal): 1815 kCal  Total Estimated Energy Need per Day (kCal/kg): 30 kCal/kg  Method for Estimating Needs: for weight gain  Estimated Fluid " Needs  Total Fluid Estimated Needs (mL): 1815 mL  Total Fluid Estimated Needs (mL/kg): 30 mL/kg  Estimated Fat Needs  Total Fat Estimated Needs (g): 72.6 g  Total Fat Estimated Needs (g/kg): 1.2 g  Method for Estimating Needs: minimum 60g/day        Nutrition Diagnosis   Malnutrition Diagnosis  Patient has Malnutrition Diagnosis: Yes  Diagnosis Status: New  Malnutrition Diagnosis: Severe malnutrition related to chronic disease or condition  As Evidenced by: oral intake < 50 %of estimated needs per dietary recall with moderate- severe muscle/ adipose losses noted visually, pt reporting anorexia, diarrhea, and EPI requirng PERT with continued nutrition impact symptoms.  Additional Assessment Information: Pt would likely benefit from general outpatient dietitian consult and continued follow-up.    Nutrition Diagnosis  Patient has Nutrition Diagnosis: Yes  Diagnosis Status (1): New  Nutrition Diagnosis 1: Altered GI function  Related to (1): EPI  As Evidenced by (1): pt requiring PERT with intermittent compliance, hx of chronic pancreatitis, continued diarrhea, and limited intake of tolerated foods.    Nutrition Interventions/Recommendations   Nutrition Prescription       Food and Nutrition Delivery  Food and Nutrition Delivery  Meals & Snacks: Energy-modified diet, Fluid-modified diet, Other, specify:  Goals: Encouraged intake of small portions through out the day- avoid foods that exacerbate symptoms- ultimately recommend follow-up with GI regard PERT dosage. Encouraged adequate hydration  Medical Food Supplement: Ensure Plus, Other, Specify:  Goals: Encourage 2 ounce of ONS every 1-2 hrs as needed for tolerance; recommend trail of ReachForce 1.4  Other:: PERT  Goals: Recommend follow-up with GI regarding enzymes; potential benefit from lower dosage given small portions; also encouraged adherence to taking    Nutrition Education  Nutrition Education  Nutrition Education Content: Content related nutrition  education  Goals: High calore high protein diet as able/ tolerate    Coordination of Care  Coordination of Nutrition Care by a Nutrition Professional  Collaboration and referral of nutrition care: Collaboration by nutrition professional with other providers    Patient Instructions   Encouraged intake of small frequent meals with compliance of enzymes  - recommend for pt to reach out to GI regarding enzyme dosage as pt is not eating enough for 80,000 lipase units; a lower dose may be helpful  - reviewed taking enzymes, take a bite then 1 enzyme and then eat, take second enzyme half way through meal.    Will have samples of Cassie Concepcion sent to pt for trial; if tolerates, would recommend KF 1.4 standard = 455kcal, 20g protein per carton    Pt would likely benefit from referral/ follow-up with general outpatient dietitian    Nutrition Monitoring and Evaluation   Food/Nutrient Related History Monitoring  Monitoring and Evaluation Plan: Energy intake, Fluid intake  Energy Intake: Estimated energy intake  Criteria: Pt will meet >/= 75% of estimated needs with oral intake  Fluid Intake: Estimated fluid intake  Criteria: Pt will maintian hydration, consume at least 60oz daily  Additional Plan: Use of ONS to help meet needs          Time Spent  Prep time on day of patient encounter: 10 minutes  Time spent directly with patient, family or caregiver: 20 minutes  Additional Time Spent on Patient Care Activities: 0 minutes  Documentation Time: 30 minutes  Other Time Spent: 0 minutes  Total: 60 minutes

## 2024-01-24 ENCOUNTER — HOSPITAL ENCOUNTER (INPATIENT)
Facility: HOSPITAL | Age: 54
LOS: 1 days | Discharge: HOME | DRG: 178 | End: 2024-01-27
Attending: EMERGENCY MEDICINE | Admitting: INTERNAL MEDICINE
Payer: COMMERCIAL

## 2024-01-24 ENCOUNTER — APPOINTMENT (OUTPATIENT)
Dept: RADIOLOGY | Facility: HOSPITAL | Age: 54
DRG: 178 | End: 2024-01-24
Payer: COMMERCIAL

## 2024-01-24 DIAGNOSIS — E86.1 HYPOTENSION DUE TO HYPOVOLEMIA: ICD-10-CM

## 2024-01-24 DIAGNOSIS — N30.00 ACUTE CYSTITIS WITHOUT HEMATURIA: ICD-10-CM

## 2024-01-24 DIAGNOSIS — U07.1 COVID-19: Primary | ICD-10-CM

## 2024-01-24 DIAGNOSIS — I95.89 HYPOTENSION DUE TO HYPOVOLEMIA: ICD-10-CM

## 2024-01-24 DIAGNOSIS — S09.90XA CLOSED HEAD INJURY, INITIAL ENCOUNTER: ICD-10-CM

## 2024-01-24 DIAGNOSIS — R55 SYNCOPE, UNSPECIFIED SYNCOPE TYPE: ICD-10-CM

## 2024-01-24 LAB
ALBUMIN SERPL-MCNC: 3.7 G/DL (ref 3.5–5)
ALP BLD-CCNC: 50 U/L (ref 35–125)
ALT SERPL-CCNC: 13 U/L (ref 5–40)
ANION GAP BLDV CALCULATED.4IONS-SCNC: 12 MMOL/L (ref 10–25)
ANION GAP SERPL CALC-SCNC: 9 MMOL/L
APTT PPP: 29.1 SECONDS (ref 22–32.5)
AST SERPL-CCNC: 16 U/L (ref 5–40)
BASE EXCESS BLDV CALC-SCNC: -5.2 MMOL/L (ref -2–3)
BASOPHILS # BLD AUTO: 0.02 X10*3/UL (ref 0–0.1)
BASOPHILS NFR BLD AUTO: 0.4 %
BILIRUB SERPL-MCNC: <0.2 MG/DL (ref 0.1–1.2)
BODY TEMPERATURE: 37 DEGREES CELSIUS
BUN SERPL-MCNC: 16 MG/DL (ref 8–25)
CA-I BLDV-SCNC: 1.23 MMOL/L (ref 1.1–1.33)
CALCIUM SERPL-MCNC: 8.5 MG/DL (ref 8.5–10.4)
CHLORIDE BLDV-SCNC: 106 MMOL/L (ref 98–107)
CHLORIDE SERPL-SCNC: 101 MMOL/L (ref 97–107)
CO2 SERPL-SCNC: 21 MMOL/L (ref 24–31)
CREAT SERPL-MCNC: 1.4 MG/DL (ref 0.4–1.6)
EGFRCR SERPLBLD CKD-EPI 2021: 45 ML/MIN/1.73M*2
EOSINOPHIL # BLD AUTO: 0.03 X10*3/UL (ref 0–0.7)
EOSINOPHIL NFR BLD AUTO: 0.6 %
ERYTHROCYTE [DISTWIDTH] IN BLOOD BY AUTOMATED COUNT: 12.9 % (ref 11.5–14.5)
GLUCOSE BLDV-MCNC: 89 MG/DL (ref 74–99)
GLUCOSE SERPL-MCNC: 95 MG/DL (ref 65–99)
HAPTOGLOB SERPL-MCNC: 192 MG/DL (ref 37–246)
HCO3 BLDV-SCNC: 21.6 MMOL/L (ref 22–26)
HCT VFR BLD AUTO: 29.8 % (ref 36–46)
HCT VFR BLD EST: 31 % (ref 36–46)
HGB BLD-MCNC: 10.4 G/DL (ref 12–16)
HGB BLDV-MCNC: 10.4 G/DL (ref 12–16)
IMM GRANULOCYTES # BLD AUTO: 0.01 X10*3/UL (ref 0–0.7)
IMM GRANULOCYTES NFR BLD AUTO: 0.2 % (ref 0–0.9)
INHALED O2 CONCENTRATION: 97 %
INR PPP: 1.1 (ref 0.9–1.2)
KAPPA LC SERPL-MCNC: 2.99 MG/DL (ref 0.33–1.94)
KAPPA LC/LAMBDA SER: 1.73 {RATIO} (ref 0.26–1.65)
LACTATE BLDV-SCNC: 0.8 MMOL/L (ref 0.4–2)
LACTATE BLDV-SCNC: 0.9 MMOL/L (ref 0.4–2)
LAMBDA LC SERPL-MCNC: 1.73 MG/DL (ref 0.57–2.63)
LIPASE SERPL-CCNC: 44 U/L (ref 16–63)
LYMPHOCYTES # BLD AUTO: 1.95 X10*3/UL (ref 1.2–4.8)
LYMPHOCYTES NFR BLD AUTO: 41 %
MCH RBC QN AUTO: 32.1 PG (ref 26–34)
MCHC RBC AUTO-ENTMCNC: 34.9 G/DL (ref 32–36)
MCV RBC AUTO: 92 FL (ref 80–100)
MONOCYTES # BLD AUTO: 0.52 X10*3/UL (ref 0.1–1)
MONOCYTES NFR BLD AUTO: 10.9 %
NEUTROPHILS # BLD AUTO: 2.23 X10*3/UL (ref 1.2–7.7)
NEUTROPHILS NFR BLD AUTO: 46.9 %
NRBC BLD-RTO: 0 /100 WBCS (ref 0–0)
OXYHGB MFR BLDV: 53.7 % (ref 45–75)
PCO2 BLDV: 47 MM HG (ref 41–51)
PH BLDV: 7.27 PH (ref 7.33–7.43)
PLATELET # BLD AUTO: 243 X10*3/UL (ref 150–450)
PO2 BLDV: 33 MM HG (ref 35–45)
POTASSIUM BLDV-SCNC: 3.4 MMOL/L (ref 3.5–5.3)
POTASSIUM SERPL-SCNC: 3.3 MMOL/L (ref 3.4–5.1)
PROT SERPL-MCNC: 5.9 G/DL (ref 5.9–7.9)
PROTHROMBIN TIME: 11.8 SECONDS (ref 9.3–12.7)
RBC # BLD AUTO: 3.24 X10*6/UL (ref 4–5.2)
SAO2 % BLDV: 54 % (ref 45–75)
SODIUM BLDV-SCNC: 136 MMOL/L (ref 136–145)
SODIUM SERPL-SCNC: 131 MMOL/L (ref 133–145)
TROPONIN T SERPL-MCNC: 7 NG/L
WBC # BLD AUTO: 4.8 X10*3/UL (ref 4.4–11.3)

## 2024-01-24 PROCEDURE — 87086 URINE CULTURE/COLONY COUNT: CPT | Mod: TRILAB,WESLAB | Performed by: EMERGENCY MEDICINE

## 2024-01-24 PROCEDURE — 85730 THROMBOPLASTIN TIME PARTIAL: CPT | Performed by: EMERGENCY MEDICINE

## 2024-01-24 PROCEDURE — 80053 COMPREHEN METABOLIC PANEL: CPT | Performed by: EMERGENCY MEDICINE

## 2024-01-24 PROCEDURE — 96365 THER/PROPH/DIAG IV INF INIT: CPT

## 2024-01-24 PROCEDURE — 81001 URINALYSIS AUTO W/SCOPE: CPT | Performed by: EMERGENCY MEDICINE

## 2024-01-24 PROCEDURE — 96375 TX/PRO/DX INJ NEW DRUG ADDON: CPT

## 2024-01-24 PROCEDURE — 36415 COLL VENOUS BLD VENIPUNCTURE: CPT | Performed by: EMERGENCY MEDICINE

## 2024-01-24 PROCEDURE — 71045 X-RAY EXAM CHEST 1 VIEW: CPT

## 2024-01-24 PROCEDURE — 85610 PROTHROMBIN TIME: CPT | Performed by: EMERGENCY MEDICINE

## 2024-01-24 PROCEDURE — 83605 ASSAY OF LACTIC ACID: CPT | Performed by: EMERGENCY MEDICINE

## 2024-01-24 PROCEDURE — 87040 BLOOD CULTURE FOR BACTERIA: CPT | Mod: TRILAB | Performed by: EMERGENCY MEDICINE

## 2024-01-24 PROCEDURE — 71045 X-RAY EXAM CHEST 1 VIEW: CPT | Mod: FOREIGN READ | Performed by: RADIOLOGY

## 2024-01-24 PROCEDURE — 84132 ASSAY OF SERUM POTASSIUM: CPT | Performed by: EMERGENCY MEDICINE

## 2024-01-24 PROCEDURE — 96361 HYDRATE IV INFUSION ADD-ON: CPT

## 2024-01-24 PROCEDURE — 70450 CT HEAD/BRAIN W/O DYE: CPT | Mod: FR

## 2024-01-24 PROCEDURE — 99291 CRITICAL CARE FIRST HOUR: CPT | Mod: 27

## 2024-01-24 PROCEDURE — 87636 SARSCOV2 & INF A&B AMP PRB: CPT | Performed by: EMERGENCY MEDICINE

## 2024-01-24 PROCEDURE — 84484 ASSAY OF TROPONIN QUANT: CPT | Performed by: EMERGENCY MEDICINE

## 2024-01-24 PROCEDURE — 99285 EMERGENCY DEPT VISIT HI MDM: CPT | Mod: 25,27 | Performed by: EMERGENCY MEDICINE

## 2024-01-24 PROCEDURE — 83690 ASSAY OF LIPASE: CPT | Performed by: EMERGENCY MEDICINE

## 2024-01-24 PROCEDURE — 2500000004 HC RX 250 GENERAL PHARMACY W/ HCPCS (ALT 636 FOR OP/ED): Performed by: EMERGENCY MEDICINE

## 2024-01-24 PROCEDURE — 70450 CT HEAD/BRAIN W/O DYE: CPT | Mod: FOREIGN READ | Performed by: RADIOLOGY

## 2024-01-24 PROCEDURE — 85025 COMPLETE CBC W/AUTO DIFF WBC: CPT | Performed by: EMERGENCY MEDICINE

## 2024-01-24 PROCEDURE — 80307 DRUG TEST PRSMV CHEM ANLYZR: CPT | Performed by: EMERGENCY MEDICINE

## 2024-01-24 RX ADMIN — SODIUM CHLORIDE 2000 ML: 900 INJECTION, SOLUTION INTRAVENOUS at 22:12

## 2024-01-24 ASSESSMENT — COLUMBIA-SUICIDE SEVERITY RATING SCALE - C-SSRS
1. IN THE PAST MONTH, HAVE YOU WISHED YOU WERE DEAD OR WISHED YOU COULD GO TO SLEEP AND NOT WAKE UP?: NO
2. HAVE YOU ACTUALLY HAD ANY THOUGHTS OF KILLING YOURSELF?: NO
6. HAVE YOU EVER DONE ANYTHING, STARTED TO DO ANYTHING, OR PREPARED TO DO ANYTHING TO END YOUR LIFE?: NO

## 2024-01-24 ASSESSMENT — PAIN - FUNCTIONAL ASSESSMENT: PAIN_FUNCTIONAL_ASSESSMENT: 0-10

## 2024-01-24 ASSESSMENT — PAIN SCALES - GENERAL
PAINLEVEL_OUTOF10: 0 - NO PAIN
PAINLEVEL_OUTOF10: 0 - NO PAIN

## 2024-01-24 NOTE — PROGRESS NOTES
"Reason for Visit  Paulie Byrd is a 53 y.o. female self referred for chronic normocytic anemia after admission for colonic ileus.    PMH/PSH: Gastroparesis, chronic pancreatitis due to ETOH, gallstones s/p cholecystectomy, hysterectomy, Graves s/p ablation, now w/hypothyroidism, alopecia, b/l knee replacement.  FH: father w/presumed stomach cancer, MGM-colon cancer  Soc Hx: former smoker, ETOH and drug user, sober/clean x 15 years; MA at , , 2 sons.    History of Present Illness:  Upon review of labs, initially noted to have anemia since 4/2023, hemoglobin in the 9-10 ranges, with her episode of acute pancreatitis. Normal platelet and WBC count.    Admitted in 12/2023 for olonic ileus managed conservatively with NG tube, surgery not required. Showed acute kidney injury likely secondary to prerenal azotemia for which she was given IV fluids.     On assessment, noted to have 200 lbs weight loss after her father death a couple of years ago. Recently, she alternates between diarrhea and constipation. Patient has been diagnosed with pancreatic enzyme insufficiency and takes pancreatic enzymes when she remembers. She was diagnosed with gastroparesis by her former GI but was told the medication to treat this can cause neurological conditions and since her mother had MS she didn't want to take it. She has had EGDs with dilations for dysphagia and c-scopes for hemorroids and polyps. Recently, she feels fatigue and has \"zero appetite\". Denies f/c/night sweats, SOB, CP, n/v/d/abd pain, bleeding from any source, neuropathy, rash or any other complaint at this time.    Review of Systems: All of the systems have been reviewed and are negative for complaints except what is stated in the HPI and/or past medical history.    Allergies   Allergen Reactions    Codeine Other    Grass Pollen Unknown              Outpatient Medication Profile:  Current Outpatient Medications   Medication Sig Dispense Refill    buPROPion XL " (Wellbutrin XL) 150 mg 24 hr tablet Take 1 tablet (150 mg) by mouth once daily in the morning. Do not crush, chew, or split. 30 tablet 3    gabapentin (Neurontin) 300 mg capsule TAKE 1 CAPSULE BY MOUTH EVERY 8 HOURS AND 1 ADDITIONAL CAPSULE AT BEDTIME (Patient taking differently: Take 4 capsules (1,200 mg) by mouth once daily at bedtime. Last OARRS fill: 12/2/23 #120 for 30 days) 360 capsule 3    levothyroxine (Synthroid, Levoxyl) 100 mcg tablet Take 1 tablet (100 mcg) by mouth once daily in the morning. Take before meals. 90 tablet 3    lipase-protease-amylase (Zenpep) 40,000-126,000- 168,000 unit capsule Take 2 capsules by mouth 3 times a day.      potassium chloride CR 20 mEq ER tablet Take 2 tablets (40 mEq) by mouth 3 times a day. Do not crush or chew.      spironolactone (Aldactone) 100 mg tablet TAKE 1 TABLET BY MOUTH DAILY 30 tablet 3     No current facility-administered medications for this visit.        Vitals and Measurements:   Visit Vitals  BP 96/62 (BP Location: Left arm)   Pulse 97   Temp 36.4 °C (97.5 °F)   Resp 16        Physical Exam:   Constitutional: alert, awake and oriented, not in acute distress   HEENT: moist mucous membranes, normal nose   Neck: supple, no lymphadenopathy   EYES: PERRL, EOM intact, conjunctiva normal  Skin: no jaundice, rash or erythema  Neurological: AAOx3, no gross focal deficit   Psychiatric: normal mood and behavior     Lab Results   Component Value Date    WBC 5.1 01/23/2024    NEUTROABS 3.46 01/23/2024    IGABSOL 0.01 01/23/2024    LYMPHSABS 1.35 01/23/2024    MONOSABS 0.23 01/23/2024    EOSABS 0.07 01/23/2024    BASOSABS 0.01 01/23/2024    RBC 3.53 (L) 01/23/2024    MCV 93 01/23/2024    MCHC 34.1 01/23/2024    HGB 11.2 (L) 01/23/2024    HCT 32.8 (L) 01/23/2024     01/23/2024     Lab Results   Component Value Date    RETICCTPCT 1.5 01/23/2024      Lab Results   Component Value Date    CREATININE 0.92 01/23/2024    BUN 12 01/23/2024    EGFR 74 01/23/2024    NA  "140 01/23/2024    K 3.2 (L) 01/23/2024     01/23/2024    CO2 23 01/23/2024      Lab Results   Component Value Date    ALT 16 01/23/2024    AST 14 01/23/2024    ALKPHOS 53 01/23/2024    BILITOT 0.3 01/23/2024      Lab Results   Component Value Date    TSH 2.10 06/15/2023     Lab Results   Component Value Date    TSH 2.10 06/15/2023     Lab Results   Component Value Date    IRON 161 (H) 01/23/2024    TIBC 311 01/23/2024    FERRITIN 54 01/23/2024      Lab Results   Component Value Date    KXBWLGEK62 1,029 (H) 01/23/2024      Lab Results   Component Value Date    FOLATE >24.0 01/23/2024     Lab Results   Component Value Date    JOHN NEGATIVE 05/09/2023    SEDRATE 16 06/15/2023      Lab Results   Component Value Date    CRP 0.13 06/15/2023      No results found for: \"CHELO\"  Lab Results   Component Value Date     01/23/2024     Lab Results   Component Value Date     (L) 01/23/2024    IGM 63 01/23/2024     01/23/2024     Assessment:    53 y.o. female self referred for chronic normocytic anemia after admission for colonic ileus.    Chronic pancreatitis due to ETOH    EPI on PERT and gastroparesis     Plan:    Reviewed and discussed lab, imaging, and pathology results with patient in detail as well as diagnosis, prognosis, and treatment options.    Will send anemia work up including nutritional, hemolysis and plasma cell dyscrasia.    Can't r/o MDS or anemia of chronic disease; discussed role of Bmbx.    Nutritional consult for appetite and weight loss    Recommend following up with GI hasn't seen them I over a year to discuss her EPI and gastroparesis.     F/ w/PCP    RTC in 2-3 weeks    Patient verbalized understanding, and all her questions were answered to her satisfaction.     60 min spent with patient greater than 50 % of which was spent in consultation, counselling and coordination of care via telehealth.     "

## 2024-01-25 ENCOUNTER — APPOINTMENT (OUTPATIENT)
Dept: RADIOLOGY | Facility: HOSPITAL | Age: 54
DRG: 178 | End: 2024-01-25
Payer: COMMERCIAL

## 2024-01-25 ENCOUNTER — TELEPHONE (OUTPATIENT)
Dept: HEMATOLOGY/ONCOLOGY | Facility: CLINIC | Age: 54
End: 2024-01-25
Payer: COMMERCIAL

## 2024-01-25 PROBLEM — R55 SYNCOPE: Status: ACTIVE | Noted: 2024-01-25

## 2024-01-25 PROBLEM — A41.9 SEPSIS (MULTI): Status: ACTIVE | Noted: 2024-01-25

## 2024-01-25 PROBLEM — U07.1 COVID: Status: ACTIVE | Noted: 2024-01-25

## 2024-01-25 PROBLEM — A41.9 SEPSIS (MULTI): Status: RESOLVED | Noted: 2024-01-25 | Resolved: 2024-01-25

## 2024-01-25 LAB
ABO GROUP (TYPE) IN BLOOD: NORMAL
ALBUMIN SERPL-MCNC: 3.5 G/DL (ref 3.5–5)
ALP BLD-CCNC: 45 U/L (ref 35–125)
ALT SERPL-CCNC: 13 U/L (ref 5–40)
AMPHETAMINES UR QL SCN>1000 NG/ML: NEGATIVE
ANION GAP SERPL CALC-SCNC: 9 MMOL/L
ANTIBODY SCREEN: NORMAL
APPEARANCE UR: CLEAR
AST SERPL-CCNC: 15 U/L (ref 5–40)
BARBITURATES UR QL SCN>300 NG/ML: NEGATIVE
BENZODIAZ UR QL SCN>300 NG/ML: NEGATIVE
BILIRUB SERPL-MCNC: <0.2 MG/DL (ref 0.1–1.2)
BILIRUB UR STRIP.AUTO-MCNC: NEGATIVE MG/DL
BUN SERPL-MCNC: 14 MG/DL (ref 8–25)
BZE UR QL SCN>300 NG/ML: NEGATIVE
CALCIUM SERPL-MCNC: 7.7 MG/DL (ref 8.5–10.4)
CANNABINOIDS UR QL SCN>50 NG/ML: POSITIVE
CHLORIDE SERPL-SCNC: 113 MMOL/L (ref 97–107)
CO2 SERPL-SCNC: 20 MMOL/L (ref 24–31)
COLOR UR: ABNORMAL
CORTIS SERPL-MCNC: 6.4 UG/DL (ref 2.5–20)
CREAT SERPL-MCNC: 1.1 MG/DL (ref 0.4–1.6)
EGFRCR SERPLBLD CKD-EPI 2021: 60 ML/MIN/1.73M*2
ERYTHROCYTE [DISTWIDTH] IN BLOOD BY AUTOMATED COUNT: 13 % (ref 11.5–14.5)
EST. AVERAGE GLUCOSE BLD GHB EST-MCNC: 108 MG/DL
FENTANYL+NORFENTANYL UR QL SCN: NEGATIVE
FLUAV RNA RESP QL NAA+PROBE: NOT DETECTED
FLUBV RNA RESP QL NAA+PROBE: NOT DETECTED
GLUCOSE SERPL-MCNC: 90 MG/DL (ref 65–99)
GLUCOSE UR STRIP.AUTO-MCNC: NORMAL MG/DL
HBA1C MFR BLD: 5.4 %
HCT VFR BLD AUTO: 29.1 % (ref 36–46)
HGB BLD-MCNC: 10 G/DL (ref 12–16)
INR PPP: 1.1 (ref 0.9–1.2)
KETONES UR STRIP.AUTO-MCNC: NEGATIVE MG/DL
LEUKOCYTE ESTERASE UR QL STRIP.AUTO: ABNORMAL
MAGNESIUM SERPL-MCNC: 1.86 MG/DL (ref 1.6–2.4)
MAGNESIUM SERPL-MCNC: 1.9 MG/DL (ref 1.6–3.1)
MCH RBC QN AUTO: 31.8 PG (ref 26–34)
MCHC RBC AUTO-ENTMCNC: 34.4 G/DL (ref 32–36)
MCV RBC AUTO: 93 FL (ref 80–100)
METHADONE UR QL SCN>300 NG/ML: NEGATIVE
MUCOUS THREADS #/AREA URNS AUTO: ABNORMAL /LPF
NITRITE UR QL STRIP.AUTO: NEGATIVE
NRBC BLD-RTO: 0 /100 WBCS (ref 0–0)
OPIATES UR QL SCN>300 NG/ML: NEGATIVE
OXYCODONE UR QL: NEGATIVE
PCP UR QL SCN>25 NG/ML: NEGATIVE
PH UR STRIP.AUTO: 5 [PH]
PLATELET # BLD AUTO: 239 X10*3/UL (ref 150–450)
POTASSIUM SERPL-SCNC: 3.6 MMOL/L (ref 3.4–5.1)
PROT SERPL-MCNC: 5.5 G/DL (ref 5.9–7.9)
PROT UR STRIP.AUTO-MCNC: NEGATIVE MG/DL
PROTHROMBIN TIME: 11.6 SECONDS (ref 9.3–12.7)
RBC # BLD AUTO: 3.14 X10*6/UL (ref 4–5.2)
RBC # UR STRIP.AUTO: NEGATIVE /UL
RBC #/AREA URNS AUTO: ABNORMAL /HPF
RH FACTOR (ANTIGEN D): NORMAL
SARS-COV-2 RNA RESP QL NAA+PROBE: DETECTED
SODIUM SERPL-SCNC: 142 MMOL/L (ref 133–145)
SP GR UR STRIP.AUTO: 1.01
SQUAMOUS #/AREA URNS AUTO: ABNORMAL /HPF
TROPONIN T SERPL-MCNC: 6 NG/L
TROPONIN T SERPL-MCNC: 7 NG/L
TROPONIN T SERPL-MCNC: 7 NG/L
TROPONIN T SERPL-MCNC: <6 NG/L
TSH SERPL DL<=0.05 MIU/L-ACNC: 0.02 MIU/L (ref 0.27–4.2)
UROBILINOGEN UR STRIP.AUTO-MCNC: NORMAL MG/DL
WBC # BLD AUTO: 5 X10*3/UL (ref 4.4–11.3)
WBC #/AREA URNS AUTO: ABNORMAL /HPF

## 2024-01-25 PROCEDURE — G0378 HOSPITAL OBSERVATION PER HR: HCPCS

## 2024-01-25 PROCEDURE — 85610 PROTHROMBIN TIME: CPT | Performed by: INTERNAL MEDICINE

## 2024-01-25 PROCEDURE — 71250 CT THORAX DX C-: CPT | Mod: FOREIGN READ | Performed by: RADIOLOGY

## 2024-01-25 PROCEDURE — 83735 ASSAY OF MAGNESIUM: CPT | Mod: TRILAB,WESLAB | Performed by: INTERNAL MEDICINE

## 2024-01-25 PROCEDURE — 71250 CT THORAX DX C-: CPT | Mod: FR

## 2024-01-25 PROCEDURE — 80053 COMPREHEN METABOLIC PANEL: CPT | Performed by: INTERNAL MEDICINE

## 2024-01-25 PROCEDURE — 2500000001 HC RX 250 WO HCPCS SELF ADMINISTERED DRUGS (ALT 637 FOR MEDICARE OP): Performed by: INTERNAL MEDICINE

## 2024-01-25 PROCEDURE — 85027 COMPLETE CBC AUTOMATED: CPT | Performed by: INTERNAL MEDICINE

## 2024-01-25 PROCEDURE — 2500000004 HC RX 250 GENERAL PHARMACY W/ HCPCS (ALT 636 FOR OP/ED): Performed by: EMERGENCY MEDICINE

## 2024-01-25 PROCEDURE — 84484 ASSAY OF TROPONIN QUANT: CPT | Performed by: INTERNAL MEDICINE

## 2024-01-25 PROCEDURE — 2500000004 HC RX 250 GENERAL PHARMACY W/ HCPCS (ALT 636 FOR OP/ED): Performed by: INTERNAL MEDICINE

## 2024-01-25 PROCEDURE — 84443 ASSAY THYROID STIM HORMONE: CPT | Performed by: INTERNAL MEDICINE

## 2024-01-25 PROCEDURE — 2500000005 HC RX 250 GENERAL PHARMACY W/O HCPCS: Mod: JZ | Performed by: NURSE PRACTITIONER

## 2024-01-25 PROCEDURE — 84484 ASSAY OF TROPONIN QUANT: CPT | Performed by: EMERGENCY MEDICINE

## 2024-01-25 PROCEDURE — 82533 TOTAL CORTISOL: CPT | Mod: TRILAB,WESLAB | Performed by: INTERNAL MEDICINE

## 2024-01-25 PROCEDURE — 36415 COLL VENOUS BLD VENIPUNCTURE: CPT | Performed by: INTERNAL MEDICINE

## 2024-01-25 PROCEDURE — 87040 BLOOD CULTURE FOR BACTERIA: CPT | Mod: TRILAB | Performed by: INTERNAL MEDICINE

## 2024-01-25 PROCEDURE — 74176 CT ABD & PELVIS W/O CONTRAST: CPT | Mod: FOREIGN READ | Performed by: RADIOLOGY

## 2024-01-25 PROCEDURE — XW033E5 INTRODUCTION OF REMDESIVIR ANTI-INFECTIVE INTO PERIPHERAL VEIN, PERCUTANEOUS APPROACH, NEW TECHNOLOGY GROUP 5: ICD-10-PCS | Performed by: INTERNAL MEDICINE

## 2024-01-25 PROCEDURE — 2500000004 HC RX 250 GENERAL PHARMACY W/ HCPCS (ALT 636 FOR OP/ED): Performed by: NURSE PRACTITIONER

## 2024-01-25 PROCEDURE — 83036 HEMOGLOBIN GLYCOSYLATED A1C: CPT | Performed by: INTERNAL MEDICINE

## 2024-01-25 PROCEDURE — 83735 ASSAY OF MAGNESIUM: CPT | Performed by: INTERNAL MEDICINE

## 2024-01-25 PROCEDURE — 86901 BLOOD TYPING SEROLOGIC RH(D): CPT | Performed by: INTERNAL MEDICINE

## 2024-01-25 PROCEDURE — 83540 ASSAY OF IRON: CPT | Performed by: INTERNAL MEDICINE

## 2024-01-25 RX ORDER — SPIRONOLACTONE 25 MG/1
12.5 TABLET ORAL DAILY
Status: DISCONTINUED | OUTPATIENT
Start: 2024-01-25 | End: 2024-01-27 | Stop reason: HOSPADM

## 2024-01-25 RX ORDER — LEVOTHYROXINE SODIUM 100 UG/1
100 TABLET ORAL DAILY
Status: DISCONTINUED | OUTPATIENT
Start: 2024-01-25 | End: 2024-01-27 | Stop reason: HOSPADM

## 2024-01-25 RX ORDER — PANTOPRAZOLE SODIUM 40 MG/1
40 TABLET, DELAYED RELEASE ORAL
Status: DISCONTINUED | OUTPATIENT
Start: 2024-01-25 | End: 2024-01-27 | Stop reason: HOSPADM

## 2024-01-25 RX ORDER — PANTOPRAZOLE SODIUM 40 MG/10ML
40 INJECTION, POWDER, LYOPHILIZED, FOR SOLUTION INTRAVENOUS
Status: DISCONTINUED | OUTPATIENT
Start: 2024-01-25 | End: 2024-01-25

## 2024-01-25 RX ORDER — DEXTROSE MONOHYDRATE, SODIUM CHLORIDE, SODIUM LACTATE, POTASSIUM CHLORIDE, CALCIUM CHLORIDE 5; 600; 310; 179; 20 G/100ML; MG/100ML; MG/100ML; MG/100ML; MG/100ML
75 INJECTION, SOLUTION INTRAVENOUS CONTINUOUS
Status: DISPENSED | OUTPATIENT
Start: 2024-01-25 | End: 2024-01-26

## 2024-01-25 RX ORDER — KETOROLAC TROMETHAMINE 30 MG/ML
15 INJECTION, SOLUTION INTRAMUSCULAR; INTRAVENOUS ONCE
Status: COMPLETED | OUTPATIENT
Start: 2024-01-25 | End: 2024-01-25

## 2024-01-25 RX ORDER — POTASSIUM CHLORIDE 20 MEQ/1
40 TABLET, EXTENDED RELEASE ORAL
Status: DISCONTINUED | OUTPATIENT
Start: 2024-01-25 | End: 2024-01-27 | Stop reason: HOSPADM

## 2024-01-25 RX ORDER — MAGNESIUM SULFATE HEPTAHYDRATE 40 MG/ML
2 INJECTION, SOLUTION INTRAVENOUS ONCE
Status: COMPLETED | OUTPATIENT
Start: 2024-01-25 | End: 2024-01-25

## 2024-01-25 RX ORDER — ACETAMINOPHEN 650 MG/1
650 SUPPOSITORY RECTAL EVERY 4 HOURS PRN
Status: DISCONTINUED | OUTPATIENT
Start: 2024-01-25 | End: 2024-01-25

## 2024-01-25 RX ORDER — ACETAMINOPHEN 160 MG/5ML
650 SOLUTION ORAL EVERY 4 HOURS PRN
Status: DISCONTINUED | OUTPATIENT
Start: 2024-01-25 | End: 2024-01-25

## 2024-01-25 RX ORDER — ACETAMINOPHEN 325 MG/1
650 TABLET ORAL EVERY 4 HOURS PRN
Status: DISCONTINUED | OUTPATIENT
Start: 2024-01-25 | End: 2024-01-27 | Stop reason: HOSPADM

## 2024-01-25 RX ORDER — ONDANSETRON HYDROCHLORIDE 2 MG/ML
4 INJECTION, SOLUTION INTRAVENOUS ONCE
Status: COMPLETED | OUTPATIENT
Start: 2024-01-25 | End: 2024-01-25

## 2024-01-25 RX ORDER — BUPROPION HYDROCHLORIDE 150 MG/1
150 TABLET ORAL EVERY MORNING
Status: DISCONTINUED | OUTPATIENT
Start: 2024-01-25 | End: 2024-01-27 | Stop reason: HOSPADM

## 2024-01-25 RX ORDER — POTASSIUM CHLORIDE 14.9 MG/ML
20 INJECTION INTRAVENOUS
Status: DISPENSED | OUTPATIENT
Start: 2024-01-25 | End: 2024-01-25

## 2024-01-25 RX ORDER — ENOXAPARIN SODIUM 100 MG/ML
40 INJECTION SUBCUTANEOUS DAILY
Status: DISCONTINUED | OUTPATIENT
Start: 2024-01-25 | End: 2024-01-27 | Stop reason: HOSPADM

## 2024-01-25 RX ORDER — OXYCODONE HYDROCHLORIDE 5 MG/1
5 TABLET ORAL EVERY 4 HOURS PRN
Status: DISCONTINUED | OUTPATIENT
Start: 2024-01-25 | End: 2024-01-27 | Stop reason: HOSPADM

## 2024-01-25 RX ORDER — GABAPENTIN 400 MG/1
1200 CAPSULE ORAL NIGHTLY
Status: DISCONTINUED | OUTPATIENT
Start: 2024-01-25 | End: 2024-01-27 | Stop reason: HOSPADM

## 2024-01-25 RX ORDER — CEFTRIAXONE 2 G/50ML
2 INJECTION, SOLUTION INTRAVENOUS ONCE
Status: COMPLETED | OUTPATIENT
Start: 2024-01-25 | End: 2024-01-25

## 2024-01-25 RX ORDER — MEROPENEM 1 G/1
1 INJECTION, POWDER, FOR SOLUTION INTRAVENOUS EVERY 12 HOURS
Status: DISCONTINUED | OUTPATIENT
Start: 2024-01-25 | End: 2024-01-25

## 2024-01-25 RX ADMIN — PANCRELIPASE 3 CAPSULE: 24000; 76000; 120000 CAPSULE, DELAYED RELEASE PELLETS ORAL at 16:36

## 2024-01-25 RX ADMIN — BUPROPION HYDROCHLORIDE 150 MG: 150 TABLET, EXTENDED RELEASE ORAL at 09:45

## 2024-01-25 RX ADMIN — PANTOPRAZOLE SODIUM 40 MG: 40 TABLET, DELAYED RELEASE ORAL at 09:45

## 2024-01-25 RX ADMIN — SPIRONOLACTONE 12.5 MG: 25 TABLET ORAL at 09:45

## 2024-01-25 RX ADMIN — CEFTRIAXONE SODIUM 2 G: 2 INJECTION, SOLUTION INTRAVENOUS at 01:16

## 2024-01-25 RX ADMIN — ONDANSETRON 4 MG: 2 INJECTION INTRAMUSCULAR; INTRAVENOUS at 01:16

## 2024-01-25 RX ADMIN — MAGNESIUM SULFATE HEPTAHYDRATE 2 G: 40 INJECTION, SOLUTION INTRAVENOUS at 11:45

## 2024-01-25 RX ADMIN — ACETAMINOPHEN 650 MG: 325 TABLET ORAL at 20:01

## 2024-01-25 RX ADMIN — GABAPENTIN 1200 MG: 400 CAPSULE ORAL at 20:01

## 2024-01-25 RX ADMIN — OXYCODONE 5 MG: 5 TABLET ORAL at 15:50

## 2024-01-25 RX ADMIN — KETOROLAC TROMETHAMINE 15 MG: 30 INJECTION INTRAMUSCULAR; INTRAVENOUS at 01:16

## 2024-01-25 RX ADMIN — SODIUM CHLORIDE 1000 ML: 9 INJECTION, SOLUTION INTRAVENOUS at 00:35

## 2024-01-25 RX ADMIN — PIPERACILLIN SODIUM AND TAZOBACTAM SODIUM 3.38 G: 3; .375 INJECTION, SOLUTION INTRAVENOUS at 14:51

## 2024-01-25 RX ADMIN — POTASSIUM CHLORIDE 40 MEQ: 1500 TABLET, EXTENDED RELEASE ORAL at 16:36

## 2024-01-25 RX ADMIN — MEROPENEM 1 G: 1 INJECTION, POWDER, FOR SOLUTION INTRAVENOUS at 04:38

## 2024-01-25 RX ADMIN — POTASSIUM CHLORIDE 40 MEQ: 1500 TABLET, EXTENDED RELEASE ORAL at 11:44

## 2024-01-25 RX ADMIN — POTASSIUM CHLORIDE 20 MEQ: 14.9 INJECTION, SOLUTION INTRAVENOUS at 04:38

## 2024-01-25 RX ADMIN — DEXTROSE MONOHYDRATE, SODIUM CHLORIDE, SODIUM LACTATE, POTASSIUM CHLORIDE, CALCIUM CHLORIDE 75 ML/HR: 5; 600; 310; 179; 20 INJECTION, SOLUTION INTRAVENOUS at 11:23

## 2024-01-25 RX ADMIN — REMDESIVIR 200 MG: 100 INJECTION, POWDER, LYOPHILIZED, FOR SOLUTION INTRAVENOUS at 13:51

## 2024-01-25 RX ADMIN — ACETAMINOPHEN 650 MG: 325 TABLET ORAL at 06:19

## 2024-01-25 RX ADMIN — PIPERACILLIN SODIUM AND TAZOBACTAM SODIUM 3.38 G: 3; .375 INJECTION, SOLUTION INTRAVENOUS at 20:01

## 2024-01-25 RX ADMIN — GABAPENTIN 1200 MG: 400 CAPSULE ORAL at 04:39

## 2024-01-25 RX ADMIN — ACETAMINOPHEN 650 MG: 325 TABLET ORAL at 10:08

## 2024-01-25 RX ADMIN — PANCRELIPASE 3 CAPSULE: 24000; 76000; 120000 CAPSULE, DELAYED RELEASE PELLETS ORAL at 12:56

## 2024-01-25 RX ADMIN — LEVOTHYROXINE SODIUM 100 MCG: 0.1 TABLET ORAL at 06:15

## 2024-01-25 SDOH — SOCIAL STABILITY: SOCIAL INSECURITY: HAS ANYONE EVER THREATENED TO HURT YOUR FAMILY OR YOUR PETS?: NO

## 2024-01-25 SDOH — SOCIAL STABILITY: SOCIAL INSECURITY: DOES ANYONE TRY TO KEEP YOU FROM HAVING/CONTACTING OTHER FRIENDS OR DOING THINGS OUTSIDE YOUR HOME?: NO

## 2024-01-25 SDOH — SOCIAL STABILITY: SOCIAL INSECURITY: ABUSE: ADULT

## 2024-01-25 SDOH — SOCIAL STABILITY: SOCIAL INSECURITY: ARE THERE ANY APPARENT SIGNS OF INJURIES/BEHAVIORS THAT COULD BE RELATED TO ABUSE/NEGLECT?: NO

## 2024-01-25 SDOH — SOCIAL STABILITY: SOCIAL INSECURITY: DO YOU FEEL UNSAFE GOING BACK TO THE PLACE WHERE YOU ARE LIVING?: NO

## 2024-01-25 SDOH — SOCIAL STABILITY: SOCIAL INSECURITY: HAVE YOU HAD THOUGHTS OF HARMING ANYONE ELSE?: NO

## 2024-01-25 SDOH — SOCIAL STABILITY: SOCIAL INSECURITY: DO YOU FEEL ANYONE HAS EXPLOITED OR TAKEN ADVANTAGE OF YOU FINANCIALLY OR OF YOUR PERSONAL PROPERTY?: NO

## 2024-01-25 SDOH — SOCIAL STABILITY: SOCIAL INSECURITY: WERE YOU ABLE TO COMPLETE ALL THE BEHAVIORAL HEALTH SCREENINGS?: YES

## 2024-01-25 SDOH — SOCIAL STABILITY: SOCIAL INSECURITY: ARE YOU OR HAVE YOU BEEN THREATENED OR ABUSED PHYSICALLY, EMOTIONALLY, OR SEXUALLY BY ANYONE?: NO

## 2024-01-25 ASSESSMENT — ENCOUNTER SYMPTOMS
MUSCULOSKELETAL NEGATIVE: 1
RESPIRATORY NEGATIVE: 1
HEMATOLOGIC/LYMPHATIC NEGATIVE: 1
PSYCHIATRIC NEGATIVE: 1
APPETITE CHANGE: 1
ALLERGIC/IMMUNOLOGIC NEGATIVE: 1
EYES NEGATIVE: 1
GASTROINTESTINAL NEGATIVE: 1
FEVER: 1
ENDOCRINE NEGATIVE: 1
CARDIOVASCULAR NEGATIVE: 1
HEADACHES: 1

## 2024-01-25 ASSESSMENT — PAIN - FUNCTIONAL ASSESSMENT
PAIN_FUNCTIONAL_ASSESSMENT: 0-10

## 2024-01-25 ASSESSMENT — COGNITIVE AND FUNCTIONAL STATUS - GENERAL
DAILY ACTIVITIY SCORE: 24
PATIENT BASELINE BEDBOUND: NO
MOBILITY SCORE: 24
MOBILITY SCORE: 24

## 2024-01-25 ASSESSMENT — ACTIVITIES OF DAILY LIVING (ADL)
WALKS IN HOME: INDEPENDENT
DRESSING YOURSELF: INDEPENDENT
GROOMING: INDEPENDENT
HEARING - LEFT EAR: FUNCTIONAL
BATHING: INDEPENDENT
TOILETING: INDEPENDENT
FEEDING YOURSELF: INDEPENDENT
ADEQUATE_TO_COMPLETE_ADL: YES
ADEQUATE_TO_COMPLETE_ADL: YES
PATIENT'S MEMORY ADEQUATE TO SAFELY COMPLETE DAILY ACTIVITIES?: YES
HEARING - RIGHT EAR: FUNCTIONAL
HEARING - LEFT EAR: FUNCTIONAL
ADL_ASSISTANCE: INDEPENDENT
LACK_OF_TRANSPORTATION: NO
BATHING_ASSISTANCE: MODIFIED INDEPENDENT (DEVICE)
GROOMING: INDEPENDENT
HEARING - RIGHT EAR: FUNCTIONAL
JUDGMENT_ADEQUATE_SAFELY_COMPLETE_DAILY_ACTIVITIES: YES
JUDGMENT_ADEQUATE_SAFELY_COMPLETE_DAILY_ACTIVITIES: YES
TOILETING: INDEPENDENT
DRESSING YOURSELF: INDEPENDENT
PATIENT'S MEMORY ADEQUATE TO SAFELY COMPLETE DAILY ACTIVITIES?: YES
BATHING: INDEPENDENT
FEEDING YOURSELF: INDEPENDENT

## 2024-01-25 ASSESSMENT — PAIN SCALES - GENERAL
PAINLEVEL_OUTOF10: 2
PAINLEVEL_OUTOF10: 3
PAINLEVEL_OUTOF10: 5 - MODERATE PAIN
PAINLEVEL_OUTOF10: 8
PAINLEVEL_OUTOF10: 6
PAINLEVEL_OUTOF10: 5 - MODERATE PAIN
PAINLEVEL_OUTOF10: 0 - NO PAIN
PAINLEVEL_OUTOF10: 6
PAINLEVEL_OUTOF10: 5 - MODERATE PAIN

## 2024-01-25 ASSESSMENT — LIFESTYLE VARIABLES
SKIP TO QUESTIONS 9-10: 1
HOW OFTEN DO YOU HAVE A DRINK CONTAINING ALCOHOL: NEVER
AUDIT-C TOTAL SCORE: 0
HOW MANY STANDARD DRINKS CONTAINING ALCOHOL DO YOU HAVE ON A TYPICAL DAY: PATIENT DOES NOT DRINK
HOW OFTEN DO YOU HAVE 6 OR MORE DRINKS ON ONE OCCASION: NEVER
AUDIT-C TOTAL SCORE: 0

## 2024-01-25 ASSESSMENT — PATIENT HEALTH QUESTIONNAIRE - PHQ9
2. FEELING DOWN, DEPRESSED OR HOPELESS: NOT AT ALL
1. LITTLE INTEREST OR PLEASURE IN DOING THINGS: NOT AT ALL
SUM OF ALL RESPONSES TO PHQ9 QUESTIONS 1 & 2: 0

## 2024-01-25 ASSESSMENT — PAIN DESCRIPTION - PAIN TYPE: TYPE: ACUTE PAIN

## 2024-01-25 ASSESSMENT — PAIN DESCRIPTION - LOCATION
LOCATION: HEAD
LOCATION: HEAD

## 2024-01-25 NOTE — TELEPHONE ENCOUNTER
Pt called and told, per [facility], that the labs that she checks are stable.  She was glad to hear that.  Pt is still hospitalized.

## 2024-01-25 NOTE — PROGRESS NOTES
Occupational Therapy    Evaluation    Patient Name: Paulie Byrd  MRN: [id number]  Today's Date: 1/25/2024  Time Calculation  Start Time: 1029  Stop Time: 1044  Time Calculation (min): 15 min    Assessment  IP OT Assessment  OT Assessment: 52 y/o female here s/p syncopal event at home.  On eval, she presents at her baseline functionally, vitals remained stable.  Has no home going concerns/needs at this time.  OT will sign off.      Prognosis: Excellent  Barriers to Discharge: None  Evaluation/Treatment Tolerance: Patient tolerated treatment well  Medical Staff Made Aware: Yes  End of Session Communication: Bedside nurse  End of Session Patient Position: Bed, 3 rail up    Plan:  No Skilled OT: Independent with ADLs  OT Frequency: OT eval only  OT Discharge Recommendations: No further acute OT  OT Recommended Transfer Status: Independent  OT - OK to Discharge: Yes    Subjective   Current Problem:  1. Syncope, unspecified syncope type        2. Hypotension due to hypovolemia        3. COVID-19        4. Acute cystitis without hematuria        5. Closed head injury, initial encounter          General:  General  Reason for Referral: Decreased ADLs  Referred By: Dr. Pitts  Past Medical History Relevant to Rehab: OA, chronic pain, pancreatitis  Family/Caregiver Present: No  Prior to Session Communication: Bedside nurse  Patient Position Received: Bed, 2 rail up  Preferred Learning Style: verbal  General Comment: Cleared by nsg, pt met in supine, agreeable to therapy assessment  Precautions:  Medical Precautions: Infection precautions (+ covid iso, on room air)  Vital Signs:  BP: 104/65  BP Location: Left arm  BP Method: Automatic  Patient Position: Standing  Pain:  Pain Assessment  Pain Assessment: 0-10  Pain Score: 5 - Moderate pain  Pain Type: Acute pain  Pain Location: Head    Objective   Cognition:  Overall Cognitive Status: Within Functional Limits    Home Living:  Type of Home: House  Lives With: Spouse,  "Friends  Home Adaptive Equipment: None  Home Layout: Multi-level  Home Living Comments: lives with spouse and \"roommate\". states \"there's tons of stairs but I'm fine\"   Prior Function:  Level of [place]: Independent with ADLs and functional transfers, Independent with homemaking with ambulation  Receives Help From: Family  ADL Assistance: Independent  Homemaking Assistance: Independent  Ambulatory Assistance: Independent  Prior Function Comments: reports she is IND at baseline, states \"I do all the caring typically\"    ADL:  Eating Assistance: Independent  Grooming Assistance: Independent  Bathing Assistance: Modified independent (Device)  UE Dressing Assistance: Independent  LE Dressing Assistance: Independent  Toileting Assistance with Device: Independent  ADL Comments: reports she has been up ad neeta, no assist. feels \"just about back to normal\"    Activity Tolerance:  Endurance: Endurance does not limit participation in activity    Bed Mobility/Transfers:   Bed Mobility  Bed Mobility: Yes  Bed Mobility 1  Bed Mobility 1: Supine to sitting  Level of Assistance 1: Modified independent  Bed Mobility Comments 1: HOB elevated  Bed Mobility 2  Bed Mobility  2: Sitting to supine  Level of Assistance 2: Modified independent  Bed Mobility Comments 2: manages trunk/BLEs no assist    Transfers  Transfer: Yes  Transfer 1  Transfer From 1: Bed to  Transfer to 1: Stand  Technique 1: Stand to sit, Sit to stand  Transfer Level of Assistance 1: Independent  Trials/Comments 1: STS from EOB, no device or assist, good balance. no dizziness  Transfers 2  Technique 2:  (functional mobility)  Transfer Level of Assistance 2: Independent  Trials/Comments 2: ambulates within hospital room, household distance, IND level. retrieves items from ground level, good stability, no LOB.  No deficits noted.     Vision: Vision - Basic Assessment  Current Vision: No visual deficits  Sensation:  Light Touch: No apparent " deficits  Strength:  Strength Comments: [place] WFL  Hand Function:  Hand Function  Gross Grasp: Functional  Coordination: Functional  Extremities: RUE   RUE : Within Functional Limits and LUE   LUE: Within Functional Limits    Outcome Measures: [facility] Daily Activity  Putting on and taking off regular lower body clothing: None  Bathing (including washing, rinsing, drying): None  Putting on and taking off regular upper body clothing: None  Toileting, which includes using toilet, bedpan or urinal: None  Taking care of personal grooming such as brushing teeth: None  Eating Meals: None  Daily Activity - Total Score: 24      Education Documentation  No documentation found.  Education Comments  No comments found.      Goals:   No OT needs indicated. DC OT at this time.

## 2024-01-25 NOTE — ED PROVIDER NOTES
HPI   Chief Complaint   Patient presents with    Dizziness    Fall       HPI       53-year-old female with weakness and syncope.  She states for the past day or so she has been feeling very weak and tired.  Reports having a temperature of 103 yesterday.  Decreased appetite.  Has history of chronic pancreatitis but denies any abdominal pain or vomiting currently.  Was going to the bathroom when she passed out hit the back of her head.  Brought in by EMS.  Reports pain in the back of the head.  No abdominal pain.  No chest pain or shortness of breath             No data recorded                Patient History   Past Medical History:   Diagnosis Date    Bilateral primary osteoarthritis of knee 01/19/2016    Degenerative arthritis of knee, bilateral    Chronic pain syndrome 06/06/2018    Pain syndrome, chronic    Chronic pancreatitis (CMS/HCC)     Contusion of lower back and pelvis, subsequent encounter 09/07/2016    Lumbar contusion, subsequent encounter    Encounter for other preprocedural examination 12/02/2016    Pre-op exam    Encounter for other preprocedural examination 01/19/2016    Preoperative evaluation to rule out surgical contraindication    Graves disease     Other abnormality of red blood cells 06/23/2015    MCV - raised    Other tear of medial meniscus, current injury, right knee, subsequent encounter 01/19/2016    Acute medial meniscal tear, right, subsequent encounter    Pain in right knee 01/19/2016    Bilateral knee pain    Personal history of other diseases of the musculoskeletal system and connective tissue 11/11/2015    History of muscle pain    Personal history of other diseases of the musculoskeletal system and connective tissue 12/29/2014    History of low back pain    Personal history of other endocrine, nutritional and metabolic disease 06/06/2018    History of hyperglycemia    Personal history of other specified conditions 12/11/2014    History of lump of right breast    Personal history of  other specified conditions 04/22/2015    History of fatigue    Unilateral primary osteoarthritis, left knee 12/02/2016    Degenerative joint disease of knee, left    Unilateral primary osteoarthritis, right knee 03/04/2016    Right knee DJD     Past Surgical History:   Procedure Laterality Date    ADENOIDECTOMY  06/27/2014    Adenoidectomy    CHOLECYSTECTOMY  06/27/2014    Cholecystectomy    HYSTERECTOMY  06/27/2014    Hysterectomy    OTHER SURGICAL HISTORY  06/27/2014    Abdominal Surgery    OTHER SURGICAL HISTORY  09/23/2021    Knee replacement    OTHER SURGICAL HISTORY  09/23/2021    Cyst excision     Family History   Problem Relation Name Age of Onset    Allergies Other       Social History     Tobacco Use    Smoking status: Former    Smokeless tobacco: Never   Vaping Use    Vaping Use: Every day    Substances: Nicotine   Substance Use Topics    Alcohol use: Not Currently     Comment: sober since 2006    Drug use: Not Currently       Physical Exam   ED Triage Vitals   Temperature Heart Rate Respirations BP   01/24/24 2322 01/24/24 2203 01/24/24 2203 01/24/24 2203   36.7 °C (98.1 °F) 73 16 (!) 79/48      Pulse Ox Temp Source Heart Rate Source Patient Position   01/24/24 2203 01/24/24 2322 -- --   97 % Oral        BP Location FiO2 (%)     -- --             Physical Exam    Gen: Older than stated age, cachectic appearing female  Head: Normocephalic, tenderness posterior scalp  Eyes: PERRL, EOMI, conjunctiva clear  ENT: External ears and nose normal, OP clear, Mucosa moist  Neck: Supple, no tenderness  Chest: No tenderness, no crepitus  CV: Regular rate and rhythm, no Murmur  Lungs: Clear bilaterally, no distress  Abd: No tenderness, no rebound or guarding  Extremities: FROM, no edema  Neuro: Cranial nerves intact, moving all 4 extremities, A&O x 4, GCS 15, strength out of 5 in all 4 extremities  Psych: Appropriate behavior and affect  Back: No focal tenderness, no CVA tenderness  Skin: No rashes or lesions  noted    ED Course & MDM   ED Course as of 01/25/24 0157   Thu Jan 25, 2024   0124 The following diagnostic tests were ordered by me and interpreted by me.  Drug screen positive for cannabis.  Chemistry panel shows creatinine 1.4 with baseline of 0.7.  Bicarb slightly low at 21.  Potassium slight low at 3.3.  These are consistent with dehydration.  Venous pH 7.23.  Lactic acid 0.9.  Initial troponin negative.  Lipase negative.  Lactic acid 0.8.  Urinalysis does show high leukocyte Estrace with white blood cells.  INR within normal notes.  CBC shows normal white blood cell count, hemoglobin 10.4 consistent with priors.  X-ray chest was ordered by me and interpreted by radiology and shows no acute cardiopulmonary maladies.  CT of the head ordered by me interpreted by radiology.  Shows no acute intracranial hemorrhage or injury [AK]   0156 EKG was ordered by me and reviewed by me at 1:55 AM.  Normal sinus rhythm rate of 63.  Normal MN QT intervals.  Normal QRS.  No acute ST or T wave changes [AK]      ED Course User Index  [AK] Allan Negrete MD         Diagnoses as of 01/25/24 0157   Syncope, unspecified syncope type   Hypotension due to hypovolemia   COVID-19   Acute cystitis without hematuria   Closed head injury, initial encounter   Patient presents with generalized weakness with a syncopal episode.  States she had a fever for the past day.  Does report dysuria and polyuria with urgency recently.  Urinalysis is positive.  She also was positive for COVID.  X-ray chest was negative.  Her blood pressure improved to the 90s after 2 L of fluid.  Reviewing her previous office visits it looks like her blood pressure usually runs about 95 systolic.  She states that it usually does run pretty low.  Her pulse never really elevated.  Pressures remained stable.  She is severely dehydrated with elevated creatinine with positive COVID and a positive UTI.  Received Rocephin IV here.  Sepsis labs were ordered.  Lactic acid was  negative therefore repeat not necessary.  After IV fluids we did perform a sepsis reevaluation her blood pressure has improved.  Mental status has improved.  However due to the severe hypotension with syncope plan to admit for further care.  Discussed case with hospitalist agreed to accept her on his service      Critical Care time: I spent a total of  35  minutes of critical care time on this patient.    Due to a high probability of clinically significant, life threatening deterioration, the patient required my highest level of preparedness to intervene emergently and I personally spent this critical care time directly and personally managing the patient. This critical care time included obtaining a history; examining the patient; pulse oximetry; ordering and review of studies; arranging urgent treatment with development of a management plan; evaluation of patient's response to treatment; frequent reassessment; and, discussions with other providers. This critical care time was performed to assess and manage the high probability of imminent, life-threatening deterioration that could result in multi-organ failure. It was exclusive of separately billable procedures and treating other patients and teaching time    Medical Decision Making      Procedure  Procedures     Allan Negrete MD  01/25/24 0127       Allan Negrete MD  01/25/24 0157

## 2024-01-25 NOTE — CONSULTS
Inpatient consult to Infectious Diseases  Consult performed by: Destiny Baker, APRN-CNP  Consult ordered by: Bg Martinez MD  Reason for consult: Covid, UTI, sepsis        Primary MD: Mukesh Yeung DO        History Of Present Illness  Paulie Byrd is a 53 y.o. female with past medical history of chronic pancreatitis, anemia.   She presented to the emergency room after syncopal event.  She reported fever of 103 F.  She reports she was going downstairs to get ginger ale and the next thing she remembers was waking up in the emergency room.  She was evaluated in the emergency room.  She reports her blood pressure normally runs low 90s systolic.  She reports headache.  She denies shortness of breath cough or chest pain.  He denies nausea vomiting or diarrhea.  Labs with no leukocytosis.  Resolving acute kidney injury.  Urinalysis with pyuria, urine culture pending.  She is on room air with normal oxygenation.  Max Temperature recorded is 37.7 °C.  CT chest abdomen pelvis shows pulmonary nodules fluid throughout the nondilated colon, findings indicative of viral enterocolitis, no bowel obstruction..  Chest x-ray showed no acute cardiopulmonary findings.  CT of head showed no acute intracranial pathology.  She was started on empiric IV antibiotics.       Past Medical History  She has a past medical history of Bilateral primary osteoarthritis of knee (01/19/2016), Chronic pain syndrome (06/06/2018), Chronic pancreatitis (CMS/[facility]), Contusion of lower back and pelvis, subsequent encounter (09/07/2016), Encounter for other preprocedural examination (12/02/2016), Encounter for other preprocedural examination (01/19/2016), Graves disease, Other abnormality of red blood cells (06/23/2015), Other tear of medial meniscus, current injury, right knee, subsequent encounter (01/19/2016), Pain in right knee (01/19/2016), Personal history of other diseases of the musculoskeletal system and connective tissue  (11/11/2015), Personal history of other diseases of the musculoskeletal system and connective tissue (12/29/2014), Personal history of other endocrine, nutritional and metabolic disease (06/06/2018), Personal history of other specified conditions (12/11/2014), Personal history of other specified conditions (04/22/2015), Unilateral primary osteoarthritis, left knee (12/02/2016), and Unilateral primary osteoarthritis, right knee (03/04/2016).    Surgical History  She has a past surgical history that includes Other surgical history (06/27/2014); Cholecystectomy (06/27/2014); Hysterectomy (06/27/2014); Adenoidectomy (06/27/2014); Other surgical history (09/23/2021); and Other surgical history (09/23/2021).     Social History     Occupational History    Not on file   Tobacco Use    Smoking status: Former    Smokeless tobacco: Never   Vaping Use    Vaping Use: Every day    Substances: Nicotine   Substance and Sexual Activity    Alcohol use: Not Currently     Comment: sober since 2006    Drug use: Not Currently    Sexual activity: Not on file     Travel History   Travel since 12/25/23    No documented travel since 12/25/23              Family History  Family History   Problem Relation Name Age of Onset    Allergies Other       Allergies  Grass pollen and Codeine     Immunization History   Administered Date(s) Administered    Influenza, Unspecified 09/21/2011, 10/15/2022, 09/08/2023    Influenza, injectable, MDCK, quadrivalent 12/23/2020    Influenza, seasonal, injectable 10/28/2016    Influenza, seasonal, injectable, preservative free 10/23/2015    Pfizer Purple Cap SARS-CoV-2 01/14/2021, 02/04/2021, 10/18/2021    Pneumococcal polysaccharide vaccine, 23-valent, age 2 years and older (PNEUMOVAX 23) 04/10/2012, 05/07/2020    Tdap vaccine, age 7 year and older (BOOSTRIX) 03/29/2011    Zoster vaccine, recombinant, adult (SHINGRIX) 01/17/2023     Medications  Home medications:  (Not in a hospital admission)    Current  "medications:  Scheduled medications  buPROPion XL, 150 mg, oral, q AM  enoxaparin, 40 mg, subcutaneous, Daily  gabapentin, 1,200 mg, oral, Nightly  levothyroxine, 100 mcg, oral, Daily  lipase-protease-amylase, 3 capsule, oral, TID with meals  magnesium sulfate, 2 g, intravenous, Once  pantoprazole, 40 mg, oral, Daily before breakfast  potassium chloride CR, 40 mEq, oral, TID with meals  [Held by provider] spironolactone, 12.5 mg, oral, Daily      Continuous medications   dextrose 5% lactated Ringer's with KCl 20 mEq/L, 75 mL/hr      PRN medications  PRN medications: acetaminophen **OR** [DISCONTINUED] acetaminophen **OR** [DISCONTINUED] acetaminophen    Review of Systems   Constitutional:  Positive for appetite change and fever.   HENT: Negative.     Eyes: Negative.    Respiratory: Negative.     Cardiovascular: Negative.    Gastrointestinal: Negative.    Endocrine: Negative.    Genitourinary: Negative.    Musculoskeletal: Negative.    Skin: Negative.    Neurological:  Positive for syncope and headaches.   Psychiatric/Behavioral: Negative.          Objective  Range of Vitals (last 24 hours)  Heart Rate:  [66-81]   Temperature:  [36.7 °C (98.1 °F)-37.7 °C (99.9 °F)]   Respirations:  [12-16]   BP: ()/(48-71)   Height:  [165.1 cm (5' 5\")]   Weight:  [65.7 kg (144 lb 13.5 oz)]   Pulse Ox:  [97 %-100 %]   Daily Weight  01/24/24 : 65.7 kg (144 lb 13.5 oz)    Body mass index is 24.1 kg/m².     Physical Exam  Constitutional:       Appearance: Normal appearance.   HENT:      Head: Normocephalic and atraumatic.      Nose: Nose normal.      Mouth/Throat:      Mouth: Mucous membranes are moist.      Pharynx: Oropharynx is clear.   Eyes:      Extraocular Movements: Extraocular movements intact.      Conjunctiva/sclera: Conjunctivae normal.   Cardiovascular:      Rate and Rhythm: Normal rate and regular rhythm.   Pulmonary:      Effort: Pulmonary effort is normal.      Breath sounds: Normal breath sounds.   Abdominal:      " General: Bowel sounds are normal.      Palpations: Abdomen is soft.   Musculoskeletal:         General: Normal range of motion.      Cervical back: Normal range of motion and neck supple.   Skin:     General: Skin is warm and dry.   Neurological:      General: No focal deficit present.      Mental Status: She is alert and oriented to person, place, and time. Mental status is at baseline.   Psychiatric:         Mood and Affect: Mood normal.         Behavior: Behavior normal.        Relevant Results  Outside Hospital Results  No  Labs  Results from last 72 hours   Lab Units 01/25/24 0250 01/24/24 2227 01/23/24  1129   WBC AUTO x10*3/uL 5.0 4.8 5.1   HEMOGLOBIN g/dL 10.0* 10.4* 11.2*   HEMATOCRIT % 29.1* 29.8* 32.8*   PLATELETS AUTO x10*3/uL 239 243 307   NEUTROS PCT AUTO %  --  46.9 67.4   LYMPHS PCT AUTO %  --  41.0 26.3   MONOS PCT AUTO %  --  10.9 4.5   EOS PCT AUTO %  --  0.6 1.4     Results from last 72 hours   Lab Units 01/25/24  0250 01/24/24 2227 01/23/24  1129   SODIUM mmol/L 142 131* 140   POTASSIUM mmol/L 3.6 3.3* 3.2*   CHLORIDE mmol/L 113* 101 102   CO2 mmol/L 20* 21* 23   BUN mg/dL 14 16 12   CREATININE mg/dL 1.10 1.40 0.92   GLUCOSE mg/dL 90 95 104*   CALCIUM mg/dL 7.7* 8.5 9.1   ANION GAP mmol/L 9 9 18   EGFR mL/min/1.73m*2 60* 45* 74     Results from last 72 hours   Lab Units 01/25/24 0250 01/24/24 2227 01/23/24  1129   ALK PHOS U/L 45 50 53   BILIRUBIN TOTAL mg/dL <0.2 <0.2 0.3   PROTEIN TOTAL g/dL 5.5* 5.9 6.4  6.6   ALT U/L 13 13 16   AST U/L 15 16 14   ALBUMIN g/dL 3.5 3.7 4.4     Estimated Creatinine Clearance: 52.6 mL/min (by C-G formula based on SCr of 1.1 mg/dL).  CRP   Date Value Ref Range Status   06/15/2023 0.13 mg/dL Final     Comment:     REF VALUE  < 1.00     Sedimentation Rate   Date Value Ref Range Status   06/15/2023 16 0 - 30 mm/h Final     HIV 1 and 2 Screen   Date Value Ref Range Status   06/16/2023 NONREACTIVE NONREACTIVE Final     Comment:      HIV Ag/Ab screen is performed  using the Siemens HealthSynchllDigilab   HIV Ag/Ab Combo assay which detects the presence of HIV    p24 antigen as well as antibodies to HIV-1   (Group M and O) and HIV-2.  .  No laboratory evidence of HIV infection. If acute HIV infection is   suspected, consider testing for HIV RNA by PCR (viral load).     Hepatitis C AB   Date Value Ref Range Status   01/23/2024 Nonreactive Nonreactive Final     Comment:     Results from patients taking biotin supplements or receiving high-dose biotin therapy should be interpreted with caution due to possible interference with this test. Providers may contact their local laboratory for further information.     Microbiology  No results found for the last 90 days.    reviewed  Imaging  CT chest abdomen pelvis wo IV contrast    Result Date: 1/25/2024  STUDY: CT Chest, Abdomen, and Pelvis without IV Contrast; 1/25/2024 2:45 AM INDICATION: Urinary tract infection. COMPARISON: CXR 1/24/2024.  XR abdomen 12/15/2023.  CT AP 12/15/2023. ACCESSION NUMBER(S): [id number] ORDERING CLINICIAN: SUNIL CASPER TECHNIQUE: CT of the chest, abdomen, and pelvis was performed.  Contiguous axial images were obtained at 3 mm slice thickness through the chest, abdomen, and pelvis.  Coronal and sagittal reconstructions at 3 mm slice thickness were performed.  No intravenous contrast was administered. FINDINGS: Please note that the evaluation of vessels, lymph nodes and organs is limited without intravenous contrast. CHEST: Common origin of the right brachiocephalic artery and left common carotid artery is seen from the aortic arch, a normal variant.  MEDIASTINUM: Cardiac size is normal without pericardial effusion. LUNGS/PLEURA: There is no pleural effusion, pleural thickening, or pneumothorax. The airways are patent. 4 mm nodule seen laterally in the right upper lobe on image 95 of series 7.  Calcified granuloma is seen in the right upper lobe along the minor fissure.  LYMPH NODES: Thoracic lymph nodes are  not enlarged. ABDOMEN:  LIVER: No hepatomegaly.  Smooth surface contour.  Normal attenuation.  BILE DUCTS: No intrahepatic or extrahepatic biliary ductal dilatation.  GALLBLADDER: Gallbladder is absent.  STOMACH: No abnormalities identified.  PANCREAS: There is mild fatty atrophy of the pancreas.   SPLEEN: No splenomegaly or focal splenic lesion.  ADRENAL GLANDS: No thickening or nodules.  KIDNEYS AND URETERS: Kidneys are normal in size and location.  No renal or ureteral calculi.  PELVIS:  BLADDER: No abnormalities identified.  REPRODUCTIVE ORGANS: No abnormalities identified.  The patient is status post hysterectomy.  BOWEL: Appendix is not definitively identified.  Terminal ileum is unremarkable.  Fluid is seen throughout the nondilated colon.   VESSELS: No abnormalities identified.  Abdominal aorta is normal in caliber. Mild calcified atheromatous plaque is seen in the abdominal aorta and iliac arteries.   PERITONEUM/RETROPERITONEUM/LYMPH NODES: No free fluid.  No pneumoperitoneum. No lymphadenopathy.  ABDOMINAL WALL: No abnormalities identified. SOFT TISSUES: No abnormalities identified.  BONES: No acute fracture or aggressive osseous lesion.    Fluid throughout the nondilated colon which may indicate a viral enterocolitis in the appropriate clinical setting.  No evidence of bowel wall thickening or obstruction. 4 mm right upper lobe pulmonary nodule.  Follow-up per Fleischner Society guidelines. Fleischner Society 2017 Guidelines for Management of Incidentally Detected Pulmonary Nodules in Adults: A.  SOLID NODULES* Nodule Type and Size: Single: *Low Risk** < 6 mm - No routine follow-up 6-8 mm - CT at 6-12 months, then consider CT at 18-24 months > 8 mm - Consider CT at 3 months, PET/CT, or tissue sampling *High Risk** < 6 mm - Optional CT at 12 months 6-8 mm - CT at 6-12 months, then CT at 18-24 months > 8 mm - Consider CT at 3 months, PET/CT, or tissue sampling Multiple: *Low Risk** < 6 mm - No routine  follow-up 6-8 mm - CT at 3-6 months, then consider CT at 18-24 months > 8 mm - CT at 3-6 months, then consider CT at 18-24 months *High Risk** < 6 mm - Optional CT at 12 months 6-8 mm - CT at 3-6 months, then at 18-24 months > 8 mm - CT at 3-6 months, then at 18-24 months B. SUBSOLID NODULES* Nodule Type and Size: Single:  *Ground glass < 6 mm - No routine follow-up > 6 mm - CT at 6-12 months to confirm persistence, then CT every 2 years until 5 years *Part Solid < 6 mm - No routine follow-up > 6 mm - CT at 3-6 months to confirm persistence.  If unchanged and solid component remains < 6 mm, annual CT should be performed for 5 years. Multiple: < 6 mm - CT at 3-6 months.  If stable, consider CT at 2 and 4 years. > 6 mm - CT at 3-6 months.  Subsequent management based on the most suspicious nodule(s). Note - These recommendations do not apply to lung cancer screening, patients with immunosuppression, or patients with known primary cancer. * Dimensions are average of long and short axes, rounded to the nearest millimeter. ** Consider all relevant risk factors. Signed by Dawit Knapp    XR chest 1 view    Result Date: 1/25/2024  STUDY: Chest Radiograph;  1/24/2024 10:52 PM INDICATION: Weakness and shortness of breath. COMPARISON: 2/17/2022 XR Chest one view ACCESSION NUMBER(S): [id number] ORDERING CLINICIAN: LANDON ROACH TECHNIQUE:  Frontal chest was obtained at 22:52 hours. FINDINGS: CARDIOMEDIASTINAL SILHOUETTE: Cardiomediastinal silhouette is normal in size and configuration.  LUNGS: Lungs are clear.  ABDOMEN: No remarkable upper abdominal findings.  BONES: No acute osseous changes.    No acute cardiopulmonary disease. Signed by Dawit Knapp    CT head wo IV contrast    Result Date: 1/24/2024  STUDY: CT Head without IV Contrast; 1/24/2024 at 10:41 PM. INDICATION: Fall, altered mental status, injury. COMPARISON: None available. ACCESSION NUMBER(S): [id number] ORDERING CLINICIAN: LANDON ROACH TECHNIQUE: Noncontrast  axial CT scan of head was performed.  Angled reformats in brain and bone windows were generated.  The images were reviewed in bone, brain, blood and soft tissue windows. Automated mA/kV exposure control was utilized and patient examination was performed in strict accordance with principles of ALARA. FINDINGS: CSF Spaces:  The ventricles, sulci and basal cisterns are within normal limits.  There is no extraaxial fluid collection.  Parenchyma:  The grey-white differentiation is intact.  There is no mass effect or midline shift.  There is no intracranial hemorrhage.  Calvarium:  The calvarium is unremarkable.  Minimal soft tissue swelling is seen in the left posterior parietal scalp.  Paranasal sinuses and mastoids:  Visualized paranasal sinuses and mastoids are clear.    No acute intracranial pathology identified.  Minimal soft tissue edema in the left posterior parietal scalp. Signed by Dawit Knapp    Electrocardiogram, 12-lead PRN ACS symptoms    Result Date: 1/9/2024  Normal sinus rhythm Normal ECG When compared with ECG of 17-FEB-2022 10:52, No significant change was found     Assessment/Plan   Coronavirus, on room air  Syncope  Pyuria vs Urinary tract infection      IV zosyn-empiric coverage-UTI-pending urine culture  Remdesisvr-up to 3 days [facility] inpatient-at risk for progression  Monitor oxygenation  Follow urine culture  Follow blood cultures  Monitor temperature and WBC  Supportive care  Droplet plus precautions          NIRMALA Pugh-CNP

## 2024-01-25 NOTE — CARE PLAN
Problem: Fall/Injury  Goal: Not fall by end of shift  Outcome: Progressing  Goal: Be free from injury by end of the shift  Outcome: Progressing  Goal: Verbalize understanding of personal risk factors for fall in the hospital  Outcome: Progressing  Goal: Verbalize understanding of risk factor reduction measures to prevent injury from fall in the home  Outcome: Progressing  Goal: Use assistive devices by end of the shift  Outcome: Progressing  Goal: Pace activities to prevent fatigue by end of the shift  Outcome: Progressing   The patient's goals for the shift include no fall    The clinical goals for the shift include no fall

## 2024-01-25 NOTE — H&P
History Of Present Illness  Paulie Byrd is a 53 y.o. female presenting with syncope.  This patient has been running fevers for the last 3 days.  Today she went downstairs to grab a cup of ginger ale and then she does not remember what happened.  She has found recollection of being in the ambulance but then she woke up truly in the emergency room.  Per the ER physician she was quite hypotensive initially.  She normally runs low blood pressure but this time it was even lower.  After fluid bolusing her she improved and she feels much better and back to normal now she was also febrile on admission.  This also improved by now.  The patient denies any headache denies any chest pain abdominal pain back pain dysuria.  She was found to be COVID-positive and also Urinary tract infection.  Chest x-ray was negative.  Rocephin was given.     Past Medical History  She has a past medical history of Bilateral primary osteoarthritis of knee (01/19/2016), Chronic pain syndrome (06/06/2018), Chronic pancreatitis (CMS/[facility]), Contusion of lower back and pelvis, subsequent encounter (09/07/2016), Encounter for other preprocedural examination (12/02/2016), Encounter for other preprocedural examination (01/19/2016), Graves disease, Other abnormality of red blood cells (06/23/2015), Other tear of medial meniscus, current injury, right knee, subsequent encounter (01/19/2016), Pain in right knee (01/19/2016), Personal history of other diseases of the musculoskeletal system and connective tissue (11/11/2015), Personal history of other diseases of the musculoskeletal system and connective tissue (12/29/2014), Personal history of other endocrine, nutritional and metabolic disease (06/06/2018), Personal history of other specified conditions (12/11/2014), Personal history of other specified conditions (04/22/2015), Unilateral primary osteoarthritis, left knee (12/02/2016), and Unilateral primary osteoarthritis, right knee  (03/04/2016).  Reviewed  Surgical History  She has a past surgical history that includes Other surgical history (06/27/2014); Cholecystectomy (06/27/2014); Hysterectomy (06/27/2014); Adenoidectomy (06/27/2014); Other surgical history (09/23/2021); and Other surgical history (09/23/2021).  Reviewed  Social History  She reports that she has quit smoking. She has never used smokeless tobacco. She reports that she does not currently use alcohol. She reports that she does not currently use drugs.  Reviewed  Family History  Family History   Problem Relation Name Age of Onset    Allergies Other          Allergies  Codeine and Grass pollen    ROS  Review of Systems   Constitutional:  Positive for fever.   HENT: Negative.     Eyes: Negative.    Respiratory: Negative.     Cardiovascular: Negative.    Gastrointestinal: Negative.    Endocrine: Negative.    Genitourinary: Negative.    Musculoskeletal: Negative.    Skin: Negative.    Allergic/Immunologic: Negative.    Neurological:  Positive for syncope.   Hematological: Negative.    Psychiatric/Behavioral: Negative.     All other systems reviewed and are negative.       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  This is a  female who is alert oriented x 3 and cooperative she is in no acute distress  Normocephalic atraumatic EOMI PERRLA  Neck supple  Chest clear  Heart regular  Abdomen soft nontender no CVA tenderness  Extremities no peripheral edema good pedal pulses  Neurologic examination is muscles are nonfocal  Skin no rash  Psych no psychosis    Relevant Results  Potassium 3.3 hemoglobin 10 WBC count 4.8 COVID-positive UA positive for infection chest x-ray negative CT head negative except for parietal scalp edema EKG normal sinus rhythm    ASSESSMENT/PLAN  Assessment/Plan   Principal Problem:    COVID  Active Problems:     Sepsis (CMS/HCC)    Sepsis fever I suspect this is more likely related to UTI versus COVID.  Would like to get additional imaging in view of the fact that she was quite significantly symptomatic.  The fact that she does not remember what happened to her most likely related to orthostatic syncope but she has been out of it for quite some time which may be related to her fever concussion or other.  Neurology cardiology evaluation keep a monitor.  Hypertension she is borderline hypotensive to begin with we will continue monitoring.  This has improved to baseline.  Would like to check cortisol level  History of hypothyroidism check TSH       Bg Martinez MD

## 2024-01-25 NOTE — CARE PLAN
Problem: Fall/Injury  Goal: Not fall by end of shift  1/25/2024 1500 by Ashly Myers RN  Outcome: Progressing  1/25/2024 1457 by Ashly Myers RN  Outcome: Progressing  Goal: Be free from injury by end of the shift  1/25/2024 1500 by Ashly Myers RN  Outcome: Progressing  1/25/2024 1457 by Ashly Myers RN  Outcome: Progressing  Goal: Verbalize understanding of personal risk factors for fall in the hospital  1/25/2024 1500 by Ashly Myers RN  Outcome: Progressing  1/25/2024 1457 by Ashly Myers RN  Outcome: Progressing  Goal: Verbalize understanding of risk factor reduction measures to prevent injury from fall in the home  1/25/2024 1500 by Ashly Myers RN  Outcome: Progressing  1/25/2024 1457 by Ashly Myers RN  Outcome: Progressing  Goal: Use assistive devices by end of the shift  1/25/2024 1500 by Ashly Myers RN  Outcome: Progressing  1/25/2024 1457 by Ashly Myers RN  Outcome: Progressing  Goal: Pace activities to prevent fatigue by end of the shift  1/25/2024 1500 by Ashly Myers RN  Outcome: Progressing  1/25/2024 1457 by Ashly Myers RN  Outcome: Progressing   The patient's goals for the shift include no fall    The clinical goals for the shift include no fall

## 2024-01-25 NOTE — NURSING NOTE
Pt arrived in her room, alert and orientedx3, no complains of SOB, dizziness or chest pain, pt also stated not to freak out on her low bp as she normally run low at home, call light within reach.

## 2024-01-25 NOTE — PROGRESS NOTES
Paulie Byrd is a 53 y.o. female on day 0 of admission presenting with Syncope.    Subjective   53-year-old  female presents with episode of syncope at home she apparently had noted fever of 102-103 at home felt lightheaded and dizzy while ambulating to get water to drink she takes spironolactone on a daily basis she was recently seen at the [facility] hematology oncology program for iron deficiency anemia is being referred for upper endoscopy in an outpatient setting she has had previous colonoscopies with removal of polyps apparently has a history of hepatitis.  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  She was began on broad-spectrum antibiotics which were subsequently discontinued infectious diseases been consulted I have canceled her neurology consultation as there is no evidence of neurologic issue cardiology consultation has been requested as well and I not seeing a need for this as this is likely orthostatic hypotension in the setting of hypovolemia induced by her fever and acute viral illness.  She is assigned observation with anticipated discharge later today or first thing tomorrow.  She was noted to be hypokalemic potassium supplementation orally has occurred she continues on IV fluids at my request she no longer requires telemetry monitoring and is assigned observation to regular nursing floor       Objective     Physical Exam  Vitals and nursing note reviewed.   Constitutional:       Appearance: Normal appearance.   HENT:      Head: Normocephalic and atraumatic.      Mouth/Throat:      Mouth: Mucous membranes are moist.  "  Eyes:      Extraocular Movements: Extraocular movements intact.      Pupils: Pupils are equal, round, and reactive to light.   Cardiovascular:      Rate and Rhythm: Normal rate and regular rhythm.      Pulses: Normal pulses.      Heart sounds: Normal heart sounds.   Pulmonary:      Effort: Pulmonary effort is normal.      Breath sounds: Normal breath sounds.   Abdominal:      General: Abdomen is flat.      Palpations: Abdomen is soft.   Musculoskeletal:         General: Normal range of motion.      Cervical back: Normal range of motion and neck supple.   Skin:     General: Skin is warm and dry.      Capillary Refill: Capillary refill takes less than 2 seconds.   Neurological:      General: No focal deficit present.      Mental Status: She is alert and oriented to person, place, and time. Mental status is at baseline.   Psychiatric:         Mood and Affect: Mood normal.         Last Recorded Vitals  Blood pressure 101/67, pulse 77, temperature 37.7 °C (99.9 °F), temperature source Axillary, resp. rate 15, height 1.651 m (5' 5\"), weight 65.7 kg (144 lb 13.5 oz), SpO2 99 %.  Intake/Output last 3 Shifts:  I/O last 3 completed shifts:  In: 3121 (47.5 mL/kg) [IV Piggyback:3121]  Out: - (0 mL/kg)   Weight: 65.7 kg     Relevant Results              Results for orders placed or performed during the hospital encounter of 01/24/24 (from the past 24 hour(s))   CBC and Auto Differential   Result Value Ref Range    WBC 4.8 4.4 - 11.3 x10*3/uL    nRBC 0.0 0.0 - 0.0 /100 WBCs    RBC 3.24 (L) 4.00 - 5.20 x10*6/uL    Hemoglobin 10.4 (L) 12.0 - 16.0 g/dL    Hematocrit 29.8 (L) 36.0 - 46.0 %    MCV 92 80 - 100 fL    MCH 32.1 26.0 - 34.0 pg    MCHC 34.9 32.0 - 36.0 g/dL    RDW 12.9 11.5 - 14.5 %    Platelets 243 150 - 450 x10*3/uL    Neutrophils % 46.9 40.0 - 80.0 %    Immature Granulocytes %, Automated 0.2 0.0 - 0.9 %    Lymphocytes % 41.0 13.0 - 44.0 %    Monocytes % 10.9 2.0 - 10.0 %    Eosinophils % 0.6 0.0 - 6.0 %    Basophils % " 0.4 0.0 - 2.0 %    Neutrophils Absolute 2.23 1.20 - 7.70 x10*3/uL    Immature Granulocytes Absolute, Automated 0.01 0.00 - 0.70 x10*3/uL    Lymphocytes Absolute 1.95 1.20 - 4.80 x10*3/uL    Monocytes Absolute 0.52 0.10 - 1.00 x10*3/uL    Eosinophils Absolute 0.03 0.00 - 0.70 x10*3/uL    Basophils Absolute 0.02 0.00 - 0.10 x10*3/uL   Comprehensive Metabolic Panel   Result Value Ref Range    Glucose 95 65 - 99 mg/dL    Sodium 131 (L) 133 - 145 mmol/L    Potassium 3.3 (L) 3.4 - 5.1 mmol/L    Chloride 101 97 - 107 mmol/L    Bicarbonate 21 (L) 24 - 31 mmol/L    Urea Nitrogen 16 8 - 25 mg/dL    Creatinine 1.40 0.40 - 1.60 mg/dL    eGFR 45 (L) >60 mL/min/1.73m*2    Calcium 8.5 8.5 - 10.4 mg/dL    Albumin 3.7 3.5 - 5.0 g/dL    Alkaline Phosphatase 50 35 - 125 U/L    Total Protein 5.9 5.9 - 7.9 g/dL    AST 16 5 - 40 U/L    Bilirubin, Total <0.2 0.1 - 1.2 mg/dL    ALT 13 5 - 40 U/L    Anion Gap 9 <=19 mmol/L   Lipase   Result Value Ref Range    Lipase 44 16 - 63 U/L   Blood Gas Venous Full Panel   Result Value Ref Range    POCT pH, Venous 7.27 (L) 7.33 - 7.43 pH    POCT pCO2, Venous 47 41 - 51 mm Hg    POCT pO2, Venous 33 (L) 35 - 45 mm Hg    POCT SO2, Venous 54 45 - 75 %    POCT Oxy Hemoglobin, Venous 53.7 45.0 - 75.0 %    POCT Hematocrit Calculated, Venous 31.0 (L) 36.0 - 46.0 %    POCT Sodium, Venous 136 136 - 145 mmol/L    POCT Potassium, Venous 3.4 (L) 3.5 - 5.3 mmol/L    POCT Chloride, Venous 106 98 - 107 mmol/L    POCT Ionized Calicum, Venous 1.23 1.10 - 1.33 mmol/L    POCT Glucose, Venous 89 74 - 99 mg/dL    POCT Lactate, Venous 0.9 0.4 - 2.0 mmol/L    POCT Base Excess, Venous -5.2 (L) -2.0 - 3.0 mmol/L    POCT HCO3 Calculated, Venous 21.6 (L) 22.0 - 26.0 mmol/L    POCT Hemoglobin, Venous 10.4 (L) 12.0 - 16.0 g/dL    POCT Anion Gap, Venous 12.0 10.0 - 25.0 mmol/L    Patient Temperature 37.0 degrees Celsius    FiO2 97 %   Protime-INR   Result Value Ref Range    Protime 11.8 9.3 - 12.7 seconds    INR 1.1 0.9 - 1.2    aPTT   Result Value Ref Range    aPTT 29.1 22.0 - 32.5 seconds   Blood Gas Lactic Acid, Venous   Result Value Ref Range    POCT Lactate, Venous 0.8 0.4 - 2.0 mmol/L   Serial Troponin, Initial ([facility])   Result Value Ref Range    Troponin T, High Sensitivity 7 <=14 ng/L   Urinalysis with Reflex Culture and Microscopic   Result Value Ref Range    Color, Urine Light-Yellow Light-Yellow, Yellow, Dark-Yellow    Appearance, Urine Clear Clear    Specific Gravity, Urine 1.008 1.005 - 1.035    pH, Urine 5.0 5.0, 5.5, 6.0, 6.5, 7.0, 7.5, 8.0    Protein, Urine NEGATIVE NEGATIVE, 10 (TRACE), 20 (TRACE) mg/dL    Glucose, Urine Normal Normal mg/dL    Blood, Urine NEGATIVE NEGATIVE    Ketones, Urine NEGATIVE NEGATIVE mg/dL    Bilirubin, Urine NEGATIVE NEGATIVE    Urobilinogen, Urine Normal Normal mg/dL    Nitrite, Urine NEGATIVE NEGATIVE    Leukocyte Esterase, Urine 250 Shantanu/µL (A) NEGATIVE   Microscopic Only, Urine   Result Value Ref Range    WBC, Urine 11-20 (A) 1-5, NONE /HPF    RBC, Urine 1-2 NONE, 1-2, 3-5 /HPF    Squamous Epithelial Cells, Urine 1-9 (SPARSE) Reference range not established. /HPF    Mucus, Urine FEW Reference range not established. /LPF   Sars-CoV-2 and Influenza A/B PCR   Result Value Ref Range    Flu A Result Not Detected Not Detected    Flu B Result Not Detected Not Detected    Coronavirus 2019, PCR Detected (A) Not Detected   Drug Screen, Urine   Result Value Ref Range    Amphetamine Screen, Urine Negative      Barbiturate Screen, Urine Negative      Benzodiazepines Screen, Urine Negative      Cannabinoid Screen, Urine Positive (A)      Cocaine Metabolite Screen, Urine Negative      Fentanyl Screen, Urine Negative       Methadone Screen, Urine Negative      Opiate Screen, Urine Negative      Oxycodone Screen, Urine Negative      PCP Screen, Urine Negative     Serial Troponin, 2 Hour ([facility])   Result Value Ref Range    Troponin T, High Sensitivity 6 <=14 ng/L   Hemoglobin A1C   Result Value Ref Range     Hemoglobin A1C 5.4 See below %    Estimated Average Glucose 108 Not Established mg/dL   Magnesium   Result Value Ref Range    Magnesium 1.90 1.60 - 3.10 mg/dL   Protime-INR   Result Value Ref Range    Protime 11.6 9.3 - 12.7 seconds    INR 1.1 0.9 - 1.2   CBC   Result Value Ref Range    WBC 5.0 4.4 - 11.3 x10*3/uL    nRBC 0.0 0.0 - 0.0 /100 WBCs    RBC 3.14 (L) 4.00 - 5.20 x10*6/uL    Hemoglobin 10.0 (L) 12.0 - 16.0 g/dL    Hematocrit 29.1 (L) 36.0 - 46.0 %    MCV 93 80 - 100 fL    MCH 31.8 26.0 - 34.0 pg    MCHC 34.4 32.0 - 36.0 g/dL    RDW 13.0 11.5 - 14.5 %    Platelets 239 150 - 450 x10*3/uL   Comprehensive metabolic panel   Result Value Ref Range    Glucose 90 65 - 99 mg/dL    Sodium 142 133 - 145 mmol/L    Potassium 3.6 3.4 - 5.1 mmol/L    Chloride 113 (H) 97 - 107 mmol/L    Bicarbonate 20 (L) 24 - 31 mmol/L    Urea Nitrogen 14 8 - 25 mg/dL    Creatinine 1.10 0.40 - 1.60 mg/dL    eGFR 60 (L) >60 mL/min/1.73m*2    Calcium 7.7 (L) 8.5 - 10.4 mg/dL    Albumin 3.5 3.5 - 5.0 g/dL    Alkaline Phosphatase 45 35 - 125 U/L    Total Protein 5.5 (L) 5.9 - 7.9 g/dL    AST 15 5 - 40 U/L    Bilirubin, Total <0.2 0.1 - 1.2 mg/dL    ALT 13 5 - 40 U/L    Anion Gap 9 <=19 mmol/L   TSH   Result Value Ref Range    Thyroid Stimulating Hormone 0.02 (L) 0.27 - 4.20 mIU/L      Scheduled medications  buPROPion XL, 150 mg, oral, q AM  enoxaparin, 40 mg, subcutaneous, Daily  gabapentin, 1,200 mg, oral, Nightly  levothyroxine, 100 mcg, oral, Daily  lipase-protease-amylase, 3 capsule, oral, TID with meals  magnesium sulfate, 2 g, intravenous, Once  pantoprazole, 40 mg, oral, Daily before breakfast  potassium chloride CR, 40 mEq, oral, TID with meals  [Held by provider] spironolactone, 12.5 mg, oral, Daily      Continuous medications   dextrose 5% lactated Ringer's with KCl 20 mEq/L, 75 mL/hr      PRN medications  PRN medications: acetaminophen **OR** [DISCONTINUED] acetaminophen **OR** [DISCONTINUED]  acetaminophen                  Assessment/Plan   Principal Problem:    Syncope  Active Problems:    COVID    Dehydration  Anemia outpatient workup and follow-up no evidence of active bleeding check iron stores  Discontinue antibiotics consider need for COVID rescue therapy Niccoli the patient is not requiring oxygen does not appear toxic will discuss with infectious disease has been consulted and just saw the patient.  Anticipating discharge in 24 hours       I spent 30 minutes in the professional and overall care of this patient.      Keynon Pitts DO

## 2024-01-26 PROBLEM — U07.1 COVID-19: Status: ACTIVE | Noted: 2024-01-26

## 2024-01-26 LAB
ALBUMIN SERPL-MCNC: 3.1 G/DL (ref 3.5–5)
ALP BLD-CCNC: 39 U/L (ref 35–125)
ALT SERPL-CCNC: 11 U/L (ref 5–40)
ANION GAP SERPL CALC-SCNC: 5 MMOL/L
AST SERPL-CCNC: 12 U/L (ref 5–40)
BACTERIA UR CULT: NORMAL
BASOPHILS # BLD AUTO: 0.01 X10*3/UL (ref 0–0.1)
BASOPHILS NFR BLD AUTO: 0.2 %
BILIRUB SERPL-MCNC: <0.2 MG/DL (ref 0.1–1.2)
BUN SERPL-MCNC: 4 MG/DL (ref 8–25)
CALCIUM SERPL-MCNC: 7.8 MG/DL (ref 8.5–10.4)
CHLORIDE SERPL-SCNC: 114 MMOL/L (ref 97–107)
CO2 SERPL-SCNC: 21 MMOL/L (ref 24–31)
CREAT SERPL-MCNC: 0.9 MG/DL (ref 0.4–1.6)
EGFRCR SERPLBLD CKD-EPI 2021: 76 ML/MIN/1.73M*2
EOSINOPHIL # BLD AUTO: 0.06 X10*3/UL (ref 0–0.7)
EOSINOPHIL NFR BLD AUTO: 1.2 %
ERYTHROCYTE [DISTWIDTH] IN BLOOD BY AUTOMATED COUNT: 13.3 % (ref 11.5–14.5)
GLUCOSE SERPL-MCNC: 109 MG/DL (ref 65–99)
HCT VFR BLD AUTO: 29.1 % (ref 36–46)
HGB BLD-MCNC: 9.9 G/DL (ref 12–16)
IMM GRANULOCYTES # BLD AUTO: 0.01 X10*3/UL (ref 0–0.7)
IMM GRANULOCYTES NFR BLD AUTO: 0.2 % (ref 0–0.9)
IRON SATN MFR SERPL: 15 % (ref 12–50)
IRON SERPL-MCNC: 32 UG/DL (ref 30–160)
LYMPHOCYTES # BLD AUTO: 2.71 X10*3/UL (ref 1.2–4.8)
LYMPHOCYTES NFR BLD AUTO: 54.6 %
MCH RBC QN AUTO: 32 PG (ref 26–34)
MCHC RBC AUTO-ENTMCNC: 34 G/DL (ref 32–36)
MCV RBC AUTO: 94 FL (ref 80–100)
MONOCYTES # BLD AUTO: 0.28 X10*3/UL (ref 0.1–1)
MONOCYTES NFR BLD AUTO: 5.6 %
NEUTROPHILS # BLD AUTO: 1.89 X10*3/UL (ref 1.2–7.7)
NEUTROPHILS NFR BLD AUTO: 38.2 %
NRBC BLD-RTO: 0 /100 WBCS (ref 0–0)
PLATELET # BLD AUTO: 254 X10*3/UL (ref 150–450)
POTASSIUM SERPL-SCNC: 4.3 MMOL/L (ref 3.4–5.1)
PROT SERPL-MCNC: 5.1 G/DL (ref 5.9–7.9)
RBC # BLD AUTO: 3.09 X10*6/UL (ref 4–5.2)
SODIUM SERPL-SCNC: 140 MMOL/L (ref 133–145)
TIBC SERPL-MCNC: 212 UG/DL (ref 228–428)
UIBC SERPL-MCNC: 180 UG/DL (ref 110–370)
WBC # BLD AUTO: 5 X10*3/UL (ref 4.4–11.3)

## 2024-01-26 PROCEDURE — 2500000005 HC RX 250 GENERAL PHARMACY W/O HCPCS: Mod: JZ | Performed by: NURSE PRACTITIONER

## 2024-01-26 PROCEDURE — 2500000001 HC RX 250 WO HCPCS SELF ADMINISTERED DRUGS (ALT 637 FOR MEDICARE OP): Performed by: INTERNAL MEDICINE

## 2024-01-26 PROCEDURE — 85025 COMPLETE CBC W/AUTO DIFF WBC: CPT | Performed by: INTERNAL MEDICINE

## 2024-01-26 PROCEDURE — 2500000004 HC RX 250 GENERAL PHARMACY W/ HCPCS (ALT 636 FOR OP/ED): Performed by: INTERNAL MEDICINE

## 2024-01-26 PROCEDURE — 1100000001 HC PRIVATE ROOM DAILY

## 2024-01-26 PROCEDURE — 80053 COMPREHEN METABOLIC PANEL: CPT | Performed by: INTERNAL MEDICINE

## 2024-01-26 PROCEDURE — 36415 COLL VENOUS BLD VENIPUNCTURE: CPT | Performed by: INTERNAL MEDICINE

## 2024-01-26 PROCEDURE — 2500000004 HC RX 250 GENERAL PHARMACY W/ HCPCS (ALT 636 FOR OP/ED): Performed by: NURSE PRACTITIONER

## 2024-01-26 RX ORDER — CEFTRIAXONE 2 G/50ML
2 INJECTION, SOLUTION INTRAVENOUS EVERY 24 HOURS
Status: DISCONTINUED | OUTPATIENT
Start: 2024-01-26 | End: 2024-01-27 | Stop reason: HOSPADM

## 2024-01-26 RX ORDER — L. ACIDOPHILUS/L.BULGARICUS 1MM CELL
1 TABLET ORAL 2 TIMES DAILY
Status: DISCONTINUED | OUTPATIENT
Start: 2024-01-26 | End: 2024-01-27 | Stop reason: HOSPADM

## 2024-01-26 RX ORDER — ONDANSETRON HYDROCHLORIDE 2 MG/ML
4 INJECTION, SOLUTION INTRAVENOUS EVERY 4 HOURS PRN
Status: DISCONTINUED | OUTPATIENT
Start: 2024-01-26 | End: 2024-01-27 | Stop reason: HOSPADM

## 2024-01-26 RX ADMIN — ONDANSETRON 4 MG: 2 INJECTION INTRAMUSCULAR; INTRAVENOUS at 15:48

## 2024-01-26 RX ADMIN — ACETAMINOPHEN 650 MG: 325 TABLET ORAL at 11:17

## 2024-01-26 RX ADMIN — Medication 1 TABLET: at 21:12

## 2024-01-26 RX ADMIN — PANTOPRAZOLE SODIUM 40 MG: 40 TABLET, DELAYED RELEASE ORAL at 06:28

## 2024-01-26 RX ADMIN — ACETAMINOPHEN 650 MG: 325 TABLET ORAL at 15:57

## 2024-01-26 RX ADMIN — PANCRELIPASE 3 CAPSULE: 24000; 76000; 120000 CAPSULE, DELAYED RELEASE PELLETS ORAL at 11:17

## 2024-01-26 RX ADMIN — POTASSIUM CHLORIDE 40 MEQ: 1500 TABLET, EXTENDED RELEASE ORAL at 08:15

## 2024-01-26 RX ADMIN — OXYCODONE 5 MG: 5 TABLET ORAL at 08:15

## 2024-01-26 RX ADMIN — LEVOTHYROXINE SODIUM 100 MCG: 0.1 TABLET ORAL at 05:31

## 2024-01-26 RX ADMIN — BUPROPION HYDROCHLORIDE 150 MG: 150 TABLET, EXTENDED RELEASE ORAL at 08:15

## 2024-01-26 RX ADMIN — PIPERACILLIN SODIUM AND TAZOBACTAM SODIUM 3.38 G: 3; .375 INJECTION, SOLUTION INTRAVENOUS at 01:18

## 2024-01-26 RX ADMIN — PIPERACILLIN SODIUM AND TAZOBACTAM SODIUM 3.38 G: 3; .375 INJECTION, SOLUTION INTRAVENOUS at 08:16

## 2024-01-26 RX ADMIN — ACETAMINOPHEN 650 MG: 325 TABLET ORAL at 01:18

## 2024-01-26 RX ADMIN — CEFTRIAXONE SODIUM 2 G: 2 INJECTION, SOLUTION INTRAVENOUS at 13:09

## 2024-01-26 RX ADMIN — DEXTROSE MONOHYDRATE, SODIUM CHLORIDE, SODIUM LACTATE, POTASSIUM CHLORIDE, CALCIUM CHLORIDE 75 ML/HR: 5; 600; 310; 179; 20 INJECTION, SOLUTION INTRAVENOUS at 01:18

## 2024-01-26 RX ADMIN — REMDESIVIR 100 MG: 100 INJECTION, POWDER, LYOPHILIZED, FOR SOLUTION INTRAVENOUS at 11:17

## 2024-01-26 RX ADMIN — POTASSIUM CHLORIDE 40 MEQ: 1500 TABLET, EXTENDED RELEASE ORAL at 11:17

## 2024-01-26 RX ADMIN — PANCRELIPASE 3 CAPSULE: 24000; 76000; 120000 CAPSULE, DELAYED RELEASE PELLETS ORAL at 08:15

## 2024-01-26 RX ADMIN — POTASSIUM CHLORIDE 40 MEQ: 1500 TABLET, EXTENDED RELEASE ORAL at 16:01

## 2024-01-26 RX ADMIN — GABAPENTIN 1200 MG: 400 CAPSULE ORAL at 21:12

## 2024-01-26 RX ADMIN — ENOXAPARIN SODIUM 40 MG: 40 INJECTION SUBCUTANEOUS at 05:31

## 2024-01-26 RX ADMIN — Medication 1 TABLET: at 11:17

## 2024-01-26 RX ADMIN — ACETAMINOPHEN 650 MG: 325 TABLET ORAL at 21:18

## 2024-01-26 RX ADMIN — ONDANSETRON 4 MG: 2 INJECTION INTRAMUSCULAR; INTRAVENOUS at 21:18

## 2024-01-26 ASSESSMENT — COGNITIVE AND FUNCTIONAL STATUS - GENERAL
DAILY ACTIVITIY SCORE: 24
MOBILITY SCORE: 24
DAILY ACTIVITIY SCORE: 24
MOBILITY SCORE: 24

## 2024-01-26 ASSESSMENT — PAIN DESCRIPTION - LOCATION
LOCATION: HEAD
LOCATION: NECK
LOCATION: HEAD
LOCATION: HEAD

## 2024-01-26 ASSESSMENT — PAIN SCALES - GENERAL
PAINLEVEL_OUTOF10: 4
PAINLEVEL_OUTOF10: 0 - NO PAIN
PAINLEVEL_OUTOF10: 6
PAINLEVEL_OUTOF10: 8
PAINLEVEL_OUTOF10: 6
PAINLEVEL_OUTOF10: 5 - MODERATE PAIN
PAINLEVEL_OUTOF10: 0 - NO PAIN
PAINLEVEL_OUTOF10: 0 - NO PAIN
PAINLEVEL_OUTOF10: 3
PAINLEVEL_OUTOF10: 3

## 2024-01-26 ASSESSMENT — PAIN - FUNCTIONAL ASSESSMENT
PAIN_FUNCTIONAL_ASSESSMENT: 0-10
PAIN_FUNCTIONAL_ASSESSMENT: 0-10
PAIN_FUNCTIONAL_ASSESSMENT: WONG-BAKER FACES
PAIN_FUNCTIONAL_ASSESSMENT: 0-10

## 2024-01-26 NOTE — PROGRESS NOTES
Paulie Byrd is a 53 y.o. female on day 0 of admission presenting with Syncope.    Subjective   Interval History:   Max temp last 24 hours 37.7 C  Reports headache controlled  Reports poor appetite  No emesis or diarrhea  No shortness of breath or chest pain  Urine culture negative        Objective   Range of Vitals (last 24 hours)  Heart Rate:  [60-77]   Temp:  [36.4 °C (97.5 °F)-37.4 °C (99.4 °F)]   Resp:  [15-17]   BP: ()/(46-78)   SpO2:  [96 %-98 %]   Daily Weight  01/24/24 : 65.7 kg (144 lb 13.5 oz)    Body mass index is 24.1 kg/m².    Physical Exam  Constitutional:       Appearance: Normal appearance.   HENT:      Head: Normocephalic and atraumatic.      Nose: Nose normal.      Mouth/Throat:      Mouth: Mucous membranes are moist.      Pharynx: Oropharynx is clear.   Eyes:      Extraocular Movements: Extraocular movements intact.      Conjunctiva/sclera: Conjunctivae normal.   Cardiovascular:      Rate and Rhythm: Normal rate and regular rhythm.   Pulmonary:      Effort: Pulmonary effort is normal.      Breath sounds: Normal breath sounds.   Abdominal:      General: Bowel sounds are normal.      Palpations: Abdomen is soft.   Musculoskeletal:         General: Normal range of motion.      Cervical back: Normal range of motion and neck supple.   Skin:     General: Skin is warm and dry.   Neurological:      General: No focal deficit present.      Mental Status: She is alert and oriented to person, place, and time. Mental status is at baseline.   Psychiatric:         Mood and Affect: Mood normal.         Behavior: Behavior normal.     Antibiotics  sodium chloride 0.9 % bolus 1,971 mL  cefTRIAXone (Rocephin) 2 g IV in dextrose 5% 50 mL  ketorolac (Toradol) injection 15 mg  ondansetron (Zofran) injection 4 mg  sodium chloride 0.9 % bolus 1,000 mL  levothyroxine (Synthroid, Levoxyl) tablet 100 mcg  gabapentin (Neurontin) capsule 1,200 mg  buPROPion XL (Wellbutrin XL) 24 hr tablet 150 mg  spironolactone  (Aldactone) tablet 12.5 mg  potassium chloride CR (Klor-Con M20) ER tablet 40 mEq  lipase-protease-amylase (Creon) 24,000-76,000 -120,000 unit per capsule 3 capsule  enoxaparin (Lovenox) syringe 40 mg  pantoprazole (ProtoNix) EC tablet 40 mg  pantoprazole (ProtoNix) injection 40 mg  acetaminophen (Tylenol) tablet 650 mg  acetaminophen (Tylenol) oral liquid 650 mg  acetaminophen (Tylenol) suppository 650 mg  meropenem (Merrem) in sodium chloride 0.9 % 100 mL IV 1 g  potassium chloride 20 mEq in 100 mL IV premix  dextrose 5 % and lactated Ringer's with KCl 20 mEq/L infusion  magnesium sulfate IV 2 g  remdesivir (Veklury) 200 mg in sodium chloride 0.9% 250 mL IV  remdesivir (Veklury) 100 mg in sodium chloride 0.9% 250 mL IV  piperacillin-tazobactam-dextrose (Zosyn) IV 3.375 g  oxyCODONE (Roxicodone) immediate release tablet 5 mg  lactobacillus acidophilus tablet 1 tablet      Relevant Results  Labs  Results from last 72 hours   Lab Units 01/26/24  0524 01/25/24  0250 01/24/24  2227   WBC AUTO x10*3/uL 5.0 5.0 4.8   HEMOGLOBIN g/dL 9.9* 10.0* 10.4*   HEMATOCRIT % 29.1* 29.1* 29.8*   PLATELETS AUTO x10*3/uL 254 239 243   NEUTROS PCT AUTO % 38.2  --  46.9   LYMPHS PCT AUTO % 54.6  --  41.0   MONOS PCT AUTO % 5.6  --  10.9   EOS PCT AUTO % 1.2  --  0.6     Results from last 72 hours   Lab Units 01/26/24  0524 01/25/24  0250 01/24/24  2227   SODIUM mmol/L 140 142 131*   POTASSIUM mmol/L 4.3 3.6 3.3*   CHLORIDE mmol/L 114* 113* 101   CO2 mmol/L 21* 20* 21*   BUN mg/dL 4* 14 16   CREATININE mg/dL 0.90 1.10 1.40   GLUCOSE mg/dL 109* 90 95   CALCIUM mg/dL 7.8* 7.7* 8.5   ANION GAP mmol/L 5 9 9   EGFR mL/min/1.73m*2 76 60* 45*     Results from last 72 hours   Lab Units 01/26/24  0524 01/25/24  0250 01/24/24  2227   ALK PHOS U/L 39 45 50   BILIRUBIN TOTAL mg/dL <0.2 <0.2 <0.2   PROTEIN TOTAL g/dL 5.1* 5.5* 5.9   ALT U/L 11 13 13   AST U/L 12 15 16   ALBUMIN g/dL 3.1* 3.5 3.7     Estimated Creatinine Clearance: 64.3 mL/min (by C-G  formula based on SCr of 0.9 mg/dL).  CRP   Date Value Ref Range Status   06/15/2023 0.13 mg/dL Final     Comment:     REF VALUE  < 1.00     Microbiology  No results found for the last 14 days.    reviewed  Imaging  Reviewed      Assessment/Plan   Coronavirus, on room air  Syncope  Pyuria vs Urinary tract infection-urine culture negative        IV zosyn-de-escalate to IV ceftriaxone -empiric for total 3 days for UTI  Remdesisvr-up to 3 days wile inpatient-at risk for progression  Monitor oxygenation  Follow blood cultures  Monitor temperature and WBC  Supportive care  Droplet plus precautions    Destiny Baker, APRN-CNP

## 2024-01-26 NOTE — CARE PLAN
The patient's goals for the shift include no fall    The clinical goals for the shift include safety

## 2024-01-26 NOTE — CARE PLAN
Problem: Fall/Injury  Goal: Not fall by end of shift  Outcome: Progressing  Goal: Be free from injury by end of the shift  Outcome: Progressing  Goal: Verbalize understanding of personal risk factors for fall in the hospital  Outcome: Progressing  Goal: Verbalize understanding of risk factor reduction measures to prevent injury from fall in the home  Outcome: Progressing  Goal: Use assistive devices by end of the shift  Outcome: Progressing  Goal: Pace activities to prevent fatigue by end of the shift  Outcome: Progressing   The patient's goals for the shift include no fall    The clinical goals for the shift include safety

## 2024-01-26 NOTE — PROGRESS NOTES
Patient is independent at home with spouse.  Will have no needs upon discharge.    More Bello RN

## 2024-01-26 NOTE — PROGRESS NOTES
"Paulie Byrd is a 53 y.o. female on day 0 of admission presenting with Syncope.    Subjective   Seen and examined tolerating remdesivir well plan is for minimum 3 days of remdesivir treatment for rescue she continues on IV Zosyn pending urine culture results which are likely to report out in the next 24 hours clinically is improving but not as rapidly as expected she still requires IV antibiotics IV remdesivir and ongoing IV fluids orthostasis has resolved electrolytes are correcting dissipated discharge in 48 hours will transition to inpatient status at this time       Objective     Physical Exam  Vitals and nursing note reviewed.   Constitutional:       Appearance: Normal appearance.   HENT:      Head: Normocephalic and atraumatic.      Mouth/Throat:      Mouth: Mucous membranes are moist.   Eyes:      Extraocular Movements: Extraocular movements intact.      Pupils: Pupils are equal, round, and reactive to light.   Cardiovascular:      Rate and Rhythm: Normal rate and regular rhythm.      Pulses: Normal pulses.      Heart sounds: Normal heart sounds.   Pulmonary:      Effort: Pulmonary effort is normal.      Breath sounds: Normal breath sounds.   Abdominal:      Palpations: Abdomen is soft.   Musculoskeletal:         General: Normal range of motion.      Cervical back: Normal range of motion and neck supple.   Skin:     General: Skin is warm and dry.      Capillary Refill: Capillary refill takes less than 2 seconds.   Neurological:      General: No focal deficit present.      Mental Status: She is alert and oriented to person, place, and time. Mental status is at baseline.   Psychiatric:         Mood and Affect: Mood normal.         Last Recorded Vitals  Blood pressure 96/78, pulse 77, temperature 36.4 °C (97.5 °F), temperature source Temporal, resp. rate 17, height 1.651 m (5' 5\"), weight 65.7 kg (144 lb 13.5 oz), SpO2 98 %.  Intake/Output last 3 Shifts:  I/O last 3 completed shifts:  In: 6186 (94.2 mL/kg) " [P.O.:1600; IV Piggyback:4586]  Out: 1925 (29.3 mL/kg) [Urine:1925 (0.8 mL/kg/hr)]  Weight: 65.7 kg     Relevant Results              Results for orders placed or performed during the hospital encounter of 01/24/24 (from the past 24 hour(s))   Blood Culture    Specimen: Peripheral Venipuncture; Blood culture   Result Value Ref Range    Blood Culture Loaded on Instrument - Culture in progress    Blood Culture    Specimen: Peripheral Venipuncture; Blood culture   Result Value Ref Range    Blood Culture Loaded on Instrument - Culture in progress    Serial Troponin, 2 Hour ([facility])   Result Value Ref Range    Troponin T, High Sensitivity 7 <=14 ng/L   Serial Troponin, 6 Hour ([facility])   Result Value Ref Range    Troponin T, High Sensitivity 7 <=14 ng/L   Serial Troponin, 6 Hour ([facility])   Result Value Ref Range    Troponin T, High Sensitivity <6 <=14 ng/L   CBC and Auto Differential   Result Value Ref Range    WBC 5.0 4.4 - 11.3 x10*3/uL    nRBC 0.0 0.0 - 0.0 /100 WBCs    RBC 3.09 (L) 4.00 - 5.20 x10*6/uL    Hemoglobin 9.9 (L) 12.0 - 16.0 g/dL    Hematocrit 29.1 (L) 36.0 - 46.0 %    MCV 94 80 - 100 fL    MCH 32.0 26.0 - 34.0 pg    MCHC 34.0 32.0 - 36.0 g/dL    RDW 13.3 11.5 - 14.5 %    Platelets 254 150 - 450 x10*3/uL    Neutrophils % 38.2 40.0 - 80.0 %    Immature Granulocytes %, Automated 0.2 0.0 - 0.9 %    Lymphocytes % 54.6 13.0 - 44.0 %    Monocytes % 5.6 2.0 - 10.0 %    Eosinophils % 1.2 0.0 - 6.0 %    Basophils % 0.2 0.0 - 2.0 %    Neutrophils Absolute 1.89 1.20 - 7.70 x10*3/uL    Immature Granulocytes Absolute, Automated 0.01 0.00 - 0.70 x10*3/uL    Lymphocytes Absolute 2.71 1.20 - 4.80 x10*3/uL    Monocytes Absolute 0.28 0.10 - 1.00 x10*3/uL    Eosinophils Absolute 0.06 0.00 - 0.70 x10*3/uL    Basophils Absolute 0.01 0.00 - 0.10 x10*3/uL   Comprehensive Metabolic Panel   Result Value Ref Range    Glucose 109 (H) 65 - 99 mg/dL    Sodium 140 133 - 145 mmol/L    Potassium 4.3 3.4 - 5.1 mmol/L    Chloride 114 (H) 97 - 107  mmol/L    Bicarbonate 21 (L) 24 - 31 mmol/L    Urea Nitrogen 4 (L) 8 - 25 mg/dL    Creatinine 0.90 0.40 - 1.60 mg/dL    eGFR 76 >60 mL/min/1.73m*2    Calcium 7.8 (L) 8.5 - 10.4 mg/dL    Albumin 3.1 (L) 3.5 - 5.0 g/dL    Alkaline Phosphatase 39 35 - 125 U/L    Total Protein 5.1 (L) 5.9 - 7.9 g/dL    AST 12 5 - 40 U/L    Bilirubin, Total <0.2 0.1 - 1.2 mg/dL    ALT 11 5 - 40 U/L    Anion Gap 5 <=19 mmol/L     No results found for the last 90 days.                      Assessment/Plan   Principal Problem:    Syncope  Active Problems:    COVID    COVID-19    Electrolyte disturbances corrected continue IV fluids diet is advanced continuing IV remdesivir for anticipated 3 days today is day #2 tomorrow would be day #3 continuing IV Zosyn awaiting results of blood cultures and urine cultures which are delayed and should report out tomorrow with decision being made for de-escalation of antibiotics and subsequent discharge if possible       I spent 30 minutes in the professional and overall care of this patient.      Kenyon Pitts DO

## 2024-01-27 VITALS
DIASTOLIC BLOOD PRESSURE: 73 MMHG | BODY MASS INDEX: 24.13 KG/M2 | WEIGHT: 144.84 LBS | TEMPERATURE: 97.9 F | OXYGEN SATURATION: 97 % | RESPIRATION RATE: 17 BRPM | HEIGHT: 65 IN | HEART RATE: 61 BPM | SYSTOLIC BLOOD PRESSURE: 102 MMHG

## 2024-01-27 LAB
ALBUMIN SERPL-MCNC: 3.3 G/DL (ref 3.5–5)
ALP BLD-CCNC: 40 U/L (ref 35–125)
ALT SERPL-CCNC: 10 U/L (ref 5–40)
ANION GAP SERPL CALC-SCNC: 5 MMOL/L
AST SERPL-CCNC: 13 U/L (ref 5–40)
BASOPHILS # BLD AUTO: 0.01 X10*3/UL (ref 0–0.1)
BASOPHILS NFR BLD AUTO: 0.2 %
BILIRUB SERPL-MCNC: <0.2 MG/DL (ref 0.1–1.2)
BUN SERPL-MCNC: 3 MG/DL (ref 8–25)
CALCIUM SERPL-MCNC: 8.4 MG/DL (ref 8.5–10.4)
CHLORIDE SERPL-SCNC: 108 MMOL/L (ref 97–107)
CO2 SERPL-SCNC: 26 MMOL/L (ref 24–31)
CREAT SERPL-MCNC: 0.7 MG/DL (ref 0.4–1.6)
EGFRCR SERPLBLD CKD-EPI 2021: >90 ML/MIN/1.73M*2
EOSINOPHIL # BLD AUTO: 0.08 X10*3/UL (ref 0–0.7)
EOSINOPHIL NFR BLD AUTO: 1.5 %
ERYTHROCYTE [DISTWIDTH] IN BLOOD BY AUTOMATED COUNT: 13.1 % (ref 11.5–14.5)
GLUCOSE SERPL-MCNC: 88 MG/DL (ref 65–99)
HCT VFR BLD AUTO: 30.2 % (ref 36–46)
HGB BLD-MCNC: 10.3 G/DL (ref 12–16)
IMM GRANULOCYTES # BLD AUTO: 0.01 X10*3/UL (ref 0–0.7)
IMM GRANULOCYTES NFR BLD AUTO: 0.2 % (ref 0–0.9)
LYMPHOCYTES # BLD AUTO: 2.96 X10*3/UL (ref 1.2–4.8)
LYMPHOCYTES NFR BLD AUTO: 56 %
MAGNESIUM SERPL-MCNC: 2 MG/DL (ref 1.6–3.1)
MCH RBC QN AUTO: 31.7 PG (ref 26–34)
MCHC RBC AUTO-ENTMCNC: 34.1 G/DL (ref 32–36)
MCV RBC AUTO: 93 FL (ref 80–100)
MONOCYTES # BLD AUTO: 0.32 X10*3/UL (ref 0.1–1)
MONOCYTES NFR BLD AUTO: 6 %
NEUTROPHILS # BLD AUTO: 1.91 X10*3/UL (ref 1.2–7.7)
NEUTROPHILS NFR BLD AUTO: 36.1 %
NRBC BLD-RTO: 0 /100 WBCS (ref 0–0)
PLATELET # BLD AUTO: 270 X10*3/UL (ref 150–450)
POTASSIUM SERPL-SCNC: 4.5 MMOL/L (ref 3.4–5.1)
PROT SERPL-MCNC: 5.4 G/DL (ref 5.9–7.9)
RBC # BLD AUTO: 3.25 X10*6/UL (ref 4–5.2)
SODIUM SERPL-SCNC: 139 MMOL/L (ref 133–145)
WBC # BLD AUTO: 5.3 X10*3/UL (ref 4.4–11.3)

## 2024-01-27 PROCEDURE — 2500000004 HC RX 250 GENERAL PHARMACY W/ HCPCS (ALT 636 FOR OP/ED): Performed by: NURSE PRACTITIONER

## 2024-01-27 PROCEDURE — 83735 ASSAY OF MAGNESIUM: CPT | Performed by: INTERNAL MEDICINE

## 2024-01-27 PROCEDURE — 80053 COMPREHEN METABOLIC PANEL: CPT | Performed by: INTERNAL MEDICINE

## 2024-01-27 PROCEDURE — 2500000001 HC RX 250 WO HCPCS SELF ADMINISTERED DRUGS (ALT 637 FOR MEDICARE OP): Performed by: INTERNAL MEDICINE

## 2024-01-27 PROCEDURE — 85025 COMPLETE CBC W/AUTO DIFF WBC: CPT | Performed by: INTERNAL MEDICINE

## 2024-01-27 PROCEDURE — 2500000004 HC RX 250 GENERAL PHARMACY W/ HCPCS (ALT 636 FOR OP/ED): Performed by: INTERNAL MEDICINE

## 2024-01-27 PROCEDURE — 36415 COLL VENOUS BLD VENIPUNCTURE: CPT | Performed by: INTERNAL MEDICINE

## 2024-01-27 RX ADMIN — BUPROPION HYDROCHLORIDE 150 MG: 150 TABLET, EXTENDED RELEASE ORAL at 08:25

## 2024-01-27 RX ADMIN — POTASSIUM CHLORIDE 40 MEQ: 1500 TABLET, EXTENDED RELEASE ORAL at 12:28

## 2024-01-27 RX ADMIN — POTASSIUM CHLORIDE 40 MEQ: 1500 TABLET, EXTENDED RELEASE ORAL at 08:25

## 2024-01-27 RX ADMIN — ENOXAPARIN SODIUM 40 MG: 40 INJECTION SUBCUTANEOUS at 05:34

## 2024-01-27 RX ADMIN — Medication 1 TABLET: at 08:25

## 2024-01-27 RX ADMIN — ACETAMINOPHEN 650 MG: 325 TABLET ORAL at 06:59

## 2024-01-27 RX ADMIN — PANCRELIPASE 3 CAPSULE: 24000; 76000; 120000 CAPSULE, DELAYED RELEASE PELLETS ORAL at 12:28

## 2024-01-27 RX ADMIN — PANCRELIPASE 3 CAPSULE: 24000; 76000; 120000 CAPSULE, DELAYED RELEASE PELLETS ORAL at 07:00

## 2024-01-27 RX ADMIN — LEVOTHYROXINE SODIUM 100 MCG: 0.1 TABLET ORAL at 05:33

## 2024-01-27 RX ADMIN — PANTOPRAZOLE SODIUM 40 MG: 40 TABLET, DELAYED RELEASE ORAL at 08:25

## 2024-01-27 RX ADMIN — CEFTRIAXONE SODIUM 2 G: 2 INJECTION, SOLUTION INTRAVENOUS at 12:28

## 2024-01-27 ASSESSMENT — COGNITIVE AND FUNCTIONAL STATUS - GENERAL
DAILY ACTIVITIY SCORE: 24
MOBILITY SCORE: 24

## 2024-01-27 ASSESSMENT — PAIN - FUNCTIONAL ASSESSMENT: PAIN_FUNCTIONAL_ASSESSMENT: 0-10

## 2024-01-27 ASSESSMENT — PAIN SCALES - GENERAL
PAINLEVEL_OUTOF10: 3
PAINLEVEL_OUTOF10: 0 - NO PAIN

## 2024-01-27 ASSESSMENT — PAIN SCALES - WONG BAKER: WONGBAKER_NUMERICALRESPONSE: NO HURT

## 2024-01-27 ASSESSMENT — PAIN DESCRIPTION - LOCATION: LOCATION: HEAD

## 2024-01-27 NOTE — DISCHARGE SUMMARY
Discharge Diagnosis  Syncope    Issues Requiring Follow-Up  COVID    Test Results Pending At Discharge  Pending Labs       Order Current Status    Extra Urine Gray Tube Collected (01/24/24 5621)    Urinalysis with Reflex Culture and Microscopic In process    Blood Culture Preliminary result    Blood Culture Preliminary result    Blood Culture Preliminary result    Blood Culture Preliminary result            Hospital Course   On admission: History and physical:    Paulie Byrd is a 53 y.o. female presenting with syncope.  This patient has been running fevers for the last 3 days.  Today she went downstairs to grab a cup of ginger ale and then she does not remember what happened.  She has found recollection of being in the ambulance but then she woke up truly in the emergency room.  Per the ER physician she was quite hypotensive initially.  She normally runs low blood pressure but this time it was even lower.  After fluid bolusing her she improved and she feels much better and back to normal now she was also febrile on admission.  This also improved by now.  The patient denies any headache denies any chest pain abdominal pain back pain dysuria.  She was found to be COVID-positive and also Urinary tract infection.  Chest x-ray was negative.  Rocephin was given.    During the hospitalization: Patient eval by infectious disease started on ceftriaxone for UTI.  Started remdesivir.  Patient had no shortness of breath and had no desaturations.  Had significant myalgias and generalized lower extremity weakness.  Patient during hospitalization continue to improve.  Hypotension resolved.  Patient was not febrile at the time of discharge.  Information discussed with the patient disease and patient advised that she can finish her current 3-day course of ceftriaxone for UTI and finished her course of remdesivir.  Patient expressed desire to be discharged.  On day of discharge lungs clear to auscultation.  Cardiovascular exam  S1 and S2 no S3.  Condition on discharge stable.    Pertinent Physical Exam At Time of Discharge  Physical Exam  Lungs: Clear to auscultation bilaterally no wheezing rales or rhonchi.  Cardiovascular: S1 and S2 regular rate and rhythm no murmurs rubs or gallops.  Outpatient Follow-Up  Future Appointments   Date Time Provider Department Center   2/13/2024  9:30 AM Hellen Wagner PA-C [id number]   5/7/2024 10:15 AM NIRMALA Gonzalez-CNP [id number]       Jose Stafford MD

## 2024-01-27 NOTE — CARE PLAN
The patient's goals for the shift include no fall    The clinical goals for the shift include safety, monitor VS and labs      Problem: Fall/Injury  Goal: Not fall by end of shift  Outcome: Progressing  Goal: Be free from injury by end of the shift  Outcome: Progressing  Goal: Verbalize understanding of personal risk factors for fall in the hospital  Outcome: Progressing  Goal: Verbalize understanding of risk factor reduction measures to prevent injury from fall in the home  Outcome: Progressing  Goal: Use assistive devices by end of the shift  Outcome: Progressing  Goal: Pace activities to prevent fatigue by end of the shift  Outcome: Progressing

## 2024-01-29 DIAGNOSIS — K90.9 IDIOPATHIC STEATORRHEA (HHS-HCC): ICD-10-CM

## 2024-01-29 DIAGNOSIS — D50.9 IRON DEFICIENCY ANEMIA, UNSPECIFIED IRON DEFICIENCY ANEMIA TYPE: Primary | ICD-10-CM

## 2024-01-29 LAB
ALBUMIN: 4.1 G/DL (ref 3.4–5)
ALPHA 1 GLOBULIN: 0.3 G/DL (ref 0.2–0.6)
ALPHA 2 GLOBULIN: 0.7 G/DL (ref 0.4–1.1)
BACTERIA BLD CULT: NORMAL
BETA GLOBULIN: 0.7 G/DL (ref 0.5–1.2)
GAMMA GLOBULIN: 0.5 G/DL (ref 0.5–1.4)
IMMUNOFIXATION COMMENT: NORMAL
PATH REVIEW - SERUM IMMUNOFIXATION: NORMAL
PATH REVIEW-SERUM PROTEIN ELECTROPHORESIS: NORMAL
PROTEIN ELECTROPHORESIS COMMENT: NORMAL

## 2024-01-31 ENCOUNTER — PATIENT OUTREACH (OUTPATIENT)
Dept: CARE COORDINATION | Facility: CLINIC | Age: 54
End: 2024-01-31
Payer: COMMERCIAL

## 2024-01-31 NOTE — PROGRESS NOTES
Outreach call to Paulie post hospital discharge ( [facility] 1.24 to 1.27.24).  Feeling somewhat better.  Adequate fluids.  Will schedule PCP [facility] independently.  Will contact Dr. Storm as was instructed to have colonoscopy repeated to R/O GI bleed (colonoscopy last done Feb 2023).  Completed post-DC Assessment.  Has all meds.  Opted out of [employer] so not enrolled today.  Advised of all [facility] services/resources.  Will plan further outreach calls at 2 and 4 week intervals post-DC; agreeable.  Encouraged to contact me for any issues/problems/concerns that may arise in the interim.  Continue to follow.

## 2024-02-01 ENCOUNTER — TELEPHONE (OUTPATIENT)
Dept: INPATIENT UNIT | Facility: HOSPITAL | Age: 54
End: 2024-02-01

## 2024-02-03 LAB
ATRIAL RATE: 70 BPM
P AXIS: 50 DEGREES
P OFFSET: 204 MS
P ONSET: 156 MS
PR INTERVAL: 128 MS
Q ONSET: 220 MS
QRS COUNT: 11 BEATS
QRS DURATION: 84 MS
QT INTERVAL: 424 MS
QTC CALCULATION(BAZETT): 457 MS
QTC FREDERICIA: 446 MS
R AXIS: 57 DEGREES
T AXIS: 42 DEGREES
T OFFSET: 432 MS
VENTRICULAR RATE: 70 BPM

## 2024-02-09 ENCOUNTER — LAB (OUTPATIENT)
Dept: LAB | Facility: LAB | Age: 54
End: 2024-02-09
Payer: COMMERCIAL

## 2024-02-09 DIAGNOSIS — E87.6 HYPOKALEMIA: Primary | ICD-10-CM

## 2024-02-09 DIAGNOSIS — E03.8 SECONDARY HYPOTHYROIDISM: ICD-10-CM

## 2024-02-09 DIAGNOSIS — K90.9 IDIOPATHIC STEATORRHEA (HHS-HCC): ICD-10-CM

## 2024-02-09 DIAGNOSIS — D50.9 IRON DEFICIENCY ANEMIA, UNSPECIFIED IRON DEFICIENCY ANEMIA TYPE: ICD-10-CM

## 2024-02-09 DIAGNOSIS — E87.6 HYPOKALEMIA: ICD-10-CM

## 2024-02-09 LAB
ANION GAP SERPL CALC-SCNC: 15 MMOL/L (ref 10–20)
BUN SERPL-MCNC: 13 MG/DL (ref 6–23)
CALCIUM SERPL-MCNC: 9.8 MG/DL (ref 8.6–10.3)
CHLORIDE SERPL-SCNC: 100 MMOL/L (ref 98–107)
CO2 SERPL-SCNC: 26 MMOL/L (ref 21–32)
CREAT SERPL-MCNC: 0.99 MG/DL (ref 0.5–1.05)
EGFRCR SERPLBLD CKD-EPI 2021: 68 ML/MIN/1.73M*2
ERYTHROCYTE [DISTWIDTH] IN BLOOD BY AUTOMATED COUNT: 12.4 % (ref 11.5–14.5)
FERRITIN SERPL-MCNC: 125 NG/ML (ref 8–150)
GLUCOSE SERPL-MCNC: 97 MG/DL (ref 74–99)
HCT VFR BLD AUTO: 36.7 % (ref 36–46)
HGB BLD-MCNC: 11.9 G/DL (ref 12–16)
IRON SATN MFR SERPL: 37 % (ref 25–45)
IRON SERPL-MCNC: 136 UG/DL (ref 35–150)
MCH RBC QN AUTO: 31.2 PG (ref 26–34)
MCHC RBC AUTO-ENTMCNC: 32.4 G/DL (ref 32–36)
MCV RBC AUTO: 96 FL (ref 80–100)
NRBC BLD-RTO: 0 /100 WBCS (ref 0–0)
PLATELET # BLD AUTO: 610 X10*3/UL (ref 150–450)
POTASSIUM SERPL-SCNC: 4.4 MMOL/L (ref 3.5–5.3)
RBC # BLD AUTO: 3.82 X10*6/UL (ref 4–5.2)
SODIUM SERPL-SCNC: 137 MMOL/L (ref 136–145)
T3FREE SERPL-MCNC: 3.4 PG/ML (ref 2.3–4.2)
T4 FREE SERPL-MCNC: 1.25 NG/DL (ref 0.61–1.12)
TIBC SERPL-MCNC: 368 UG/DL (ref 240–445)
TSH SERPL-ACNC: 0.16 MIU/L (ref 0.44–3.98)
UIBC SERPL-MCNC: 232 UG/DL (ref 110–370)
WBC # BLD AUTO: 11 X10*3/UL (ref 4.4–11.3)

## 2024-02-09 PROCEDURE — 36415 COLL VENOUS BLD VENIPUNCTURE: CPT

## 2024-02-09 PROCEDURE — 83550 IRON BINDING TEST: CPT

## 2024-02-09 PROCEDURE — 84439 ASSAY OF FREE THYROXINE: CPT

## 2024-02-09 PROCEDURE — 83540 ASSAY OF IRON: CPT

## 2024-02-09 PROCEDURE — 84443 ASSAY THYROID STIM HORMONE: CPT

## 2024-02-09 PROCEDURE — 85027 COMPLETE CBC AUTOMATED: CPT

## 2024-02-09 PROCEDURE — 82728 ASSAY OF FERRITIN: CPT

## 2024-02-09 PROCEDURE — 84481 FREE ASSAY (FT-3): CPT

## 2024-02-09 PROCEDURE — 80048 BASIC METABOLIC PNL TOTAL CA: CPT

## 2024-02-09 RX ORDER — LEVOTHYROXINE SODIUM 75 UG/1
75 TABLET ORAL DAILY
Qty: 90 TABLET | Refills: 1 | Status: SHIPPED | OUTPATIENT
Start: 2024-02-09 | End: 2024-05-01 | Stop reason: SDUPTHER

## 2024-02-13 ENCOUNTER — TELEMEDICINE (OUTPATIENT)
Dept: HEMATOLOGY/ONCOLOGY | Facility: CLINIC | Age: 54
End: 2024-02-13
Payer: COMMERCIAL

## 2024-02-13 DIAGNOSIS — R11.0 NAUSEA IN ADULT: ICD-10-CM

## 2024-02-13 DIAGNOSIS — K56.7 ILEUS (MULTI): ICD-10-CM

## 2024-02-13 DIAGNOSIS — K90.9 IDIOPATHIC STEATORRHEA (HHS-HCC): ICD-10-CM

## 2024-02-13 DIAGNOSIS — K31.84 GASTROPARESIS: ICD-10-CM

## 2024-02-13 DIAGNOSIS — D50.0 IRON DEFICIENCY ANEMIA DUE TO CHRONIC BLOOD LOSS: ICD-10-CM

## 2024-02-13 DIAGNOSIS — K29.50 CHRONIC GASTRITIS WITHOUT BLEEDING, UNSPECIFIED GASTRITIS TYPE: ICD-10-CM

## 2024-02-13 PROCEDURE — 99214 OFFICE O/P EST MOD 30 MIN: CPT | Performed by: PHYSICIAN ASSISTANT

## 2024-02-13 PROCEDURE — 1036F TOBACCO NON-USER: CPT | Performed by: PHYSICIAN ASSISTANT

## 2024-02-13 NOTE — PROGRESS NOTES
"Visit Type: Benign Heme Follow-up, Virtual Visit:  A Virtual visit (telephone only) between the patient (at the originating site) and the provider (at the distant site) was utilized to provide this telehealth service.   Verbal Consent for Encounter: Verbal consent was requested and obtained from patient, or from parent/guardian if minor, on this date for a telehealth visit.     Reason for Visit  Paulie Byrd is a 53 y.o. female self referred for chronic normocytic anemia after admission for colonic ileus.    Upon review of labs, initially noted to have anemia since 4/2023, hemoglobin in the 9-10 ranges, with her episode of acute pancreatitis. Normal platelet and WBC count.    Admitted in 12/2023 for olonic ileus managed conservatively with NG tube, surgery not required. Showed acute kidney injury likely secondary to prerenal azotemia for which she was given IV fluids.     On assessment, noted to have 200 lbs weight loss after her father death a couple of years ago. Recently, she alternates between diarrhea and constipation. Patient has been diagnosed with pancreatic enzyme insufficiency and takes pancreatic enzymes when she remembers. She was diagnosed with gastroparesis by her former GI but was told the medication to treat this can cause neurological conditions and since her mother had MS she didn't want to take it. She has had EGDs with dilations for dysphagia and c-scopes for hemorroids and polyps. Recently, she feels fatigue and has \"zero appetite\". Denies f/c/night sweats, SOB, CP, n/v/d/abd pain, bleeding from any source, neuropathy, rash or any other complaint at this time.    PMH/PSH: Gastroparesis, chronic pancreatitis due to ETOH, gallstones s/p cholecystectomy, hysterectomy, Graves s/p ablation, now w/hypothyroidism, alopecia, b/l knee replacement.  FH: father w/presumed stomach cancer, MGM-colon cancer  Soc Hx: former smoker, ETOH and drug user, sober/clean x 15 years; MA at , , 2 " sons.    History of Present Illness:  Recently admitted for CoVID. Reports ongoing fatigue. Has been feeling better off of her vitamins. Otherwise doing well.    Review of Systems: All of the systems have been reviewed and are negative for complaints except what is stated in the HPI and/or past medical history.    Allergies   Allergen Reactions    Grass Pollen Unknown    Codeine GI Upset              Outpatient Medication Profile:  Current Outpatient Medications   Medication Sig Dispense Refill    buPROPion XL (Wellbutrin XL) 150 mg 24 hr tablet Take 1 tablet (150 mg) by mouth once daily in the morning. Do not crush, chew, or split. 30 tablet 3    gabapentin (Neurontin) 300 mg capsule TAKE 1 CAPSULE BY MOUTH EVERY 8 HOURS AND 1 ADDITIONAL CAPSULE AT BEDTIME (Patient taking differently: Take 4 capsules (1,200 mg) by mouth once daily at bedtime. Last OARRS fill: 12/2/23 #120 for 30 days) 360 capsule 3    levothyroxine (Synthroid, Levoxyl) 75 mcg tablet Take 1 tablet (75 mcg) by mouth once daily. 90 tablet 1    potassium chloride CR 20 mEq ER tablet Take 2 tablets (40 mEq) by mouth 3 times a day. Do not crush or chew.      spironolactone (Aldactone) 100 mg tablet TAKE 1 TABLET BY MOUTH DAILY 30 tablet 3     No current facility-administered medications for this visit.        Vitals and Measurements:   There were no vitals taken for this visit.       Physical Exam:   Deferred due to telehealth     Lab Results   Component Value Date    WBC 11.0 02/09/2024    NEUTROABS 1.91 01/27/2024    IGABSOL 0.01 01/27/2024    LYMPHSABS 2.96 01/27/2024    MONOSABS 0.32 01/27/2024    EOSABS 0.08 01/27/2024    BASOSABS 0.01 01/27/2024    RBC 3.82 (L) 02/09/2024    MCV 96 02/09/2024    MCHC 32.4 02/09/2024    HGB 11.9 (L) 02/09/2024    HCT 36.7 02/09/2024     (H) 02/09/2024     Lab Results   Component Value Date    RETICCTPCT 1.5 01/23/2024      Lab Results   Component Value Date    CREATININE 0.99 02/09/2024    BUN 13 02/09/2024  "   EGFR 68 02/09/2024     02/09/2024    K 4.4 02/09/2024     02/09/2024    CO2 26 02/09/2024      Lab Results   Component Value Date    ALT 10 01/27/2024    AST 13 01/27/2024    ALKPHOS 40 01/27/2024    BILITOT <0.2 01/27/2024      Lab Results   Component Value Date    TSH 0.16 (L) 02/09/2024     Lab Results   Component Value Date    TSH 0.16 (L) 02/09/2024     Lab Results   Component Value Date    IRON 136 02/09/2024    TIBC 368 02/09/2024    FERRITIN 125 02/09/2024      Lab Results   Component Value Date    AWWEPSPU88 1,029 (H) 01/23/2024      Lab Results   Component Value Date    FOLATE >24.0 01/23/2024     Lab Results   Component Value Date    JOHN NEGATIVE 05/09/2023    SEDRATE 16 06/15/2023      Lab Results   Component Value Date    CRP 0.13 06/15/2023      No results found for: \"CHELO\"  Lab Results   Component Value Date     01/23/2024     Lab Results   Component Value Date     (L) 01/23/2024    IGM 63 01/23/2024     01/23/2024     Lab on 02/09/2024   Component Date Value Ref Range Status    Iron 02/09/2024 136  35 - 150 ug/dL Final    UIBC 02/09/2024 232  110 - 370 ug/dL Final    TIBC 02/09/2024 368  240 - 445 ug/dL Final    % Saturation 02/09/2024 37  25 - 45 % Final    Ferritin 02/09/2024 125  8 - 150 ng/mL Final    WBC 02/09/2024 11.0  4.4 - 11.3 x10*3/uL Final    nRBC 02/09/2024 0.0  0.0 - 0.0 /100 WBCs Final    RBC 02/09/2024 3.82 (L)  4.00 - 5.20 x10*6/uL Final    Hemoglobin 02/09/2024 11.9 (L)  12.0 - 16.0 g/dL Final    Hematocrit 02/09/2024 36.7  36.0 - 46.0 % Final    MCV 02/09/2024 96  80 - 100 fL Final    MCH 02/09/2024 31.2  26.0 - 34.0 pg Final    MCHC 02/09/2024 32.4  32.0 - 36.0 g/dL Final    RDW 02/09/2024 12.4  11.5 - 14.5 % Final    Platelets 02/09/2024 610 (H)  150 - 450 x10*3/uL Final    delta reviewed    Glucose 02/09/2024 97  74 - 99 mg/dL Final    Sodium 02/09/2024 137  136 - 145 mmol/L Final    Potassium 02/09/2024 4.4  3.5 - 5.3 mmol/L Final    Chloride " 02/09/2024 100  98 - 107 mmol/L Final    Bicarbonate 02/09/2024 26  21 - 32 mmol/L Final    Anion Gap 02/09/2024 15  10 - 20 mmol/L Final    Urea Nitrogen 02/09/2024 13  6 - 23 mg/dL Final    Creatinine 02/09/2024 0.99  0.50 - 1.05 mg/dL Final    eGFR 02/09/2024 68  >60 mL/min/1.73m*2 Final    Calculations of estimated GFR are performed using the 2021 CKD-EPI Study Refit equation without the race variable for the IDMS-Traceable creatinine methods.  https://jasn.asnjournals.org/content/early/2021/09/22/ASN.7257422432    Calcium 02/09/2024 9.8  8.6 - 10.3 mg/dL Final    Thyroid Stimulating Hormone 02/09/2024 0.16 (L)  0.44 - 3.98 mIU/L Final    Thyroxine, Free 02/09/2024 1.25 (H)  0.61 - 1.12 ng/dL Final    Triiodothyronine, Free 02/09/2024 3.4  2.3 - 4.2 pg/mL Final   No results displayed because visit has over 200 results.      Office Visit on 01/23/2024   Component Date Value Ref Range Status    WBC 01/23/2024 5.1  4.4 - 11.3 x10*3/uL Final    nRBC 01/23/2024    Final    Not Measured    RBC 01/23/2024 3.53 (L)  4.00 - 5.20 x10*6/uL Final    Hemoglobin 01/23/2024 11.2 (L)  12.0 - 16.0 g/dL Final    Hematocrit 01/23/2024 32.8 (L)  36.0 - 46.0 % Final    MCV 01/23/2024 93  80 - 100 fL Final    MCH 01/23/2024 31.7  26.0 - 34.0 pg Final    MCHC 01/23/2024 34.1  32.0 - 36.0 g/dL Final    RDW 01/23/2024 12.9  11.5 - 14.5 % Final    Platelets 01/23/2024 307  150 - 450 x10*3/uL Final    Neutrophils % 01/23/2024 67.4  40.0 - 80.0 % Final    Immature Granulocytes %, Automated 01/23/2024 0.2  0.0 - 0.9 % Final    Immature Granulocyte Count (IG) includes promyelocytes, myelocytes and metamyelocytes but does not include bands. Percent differential counts (%) should be interpreted in the context of the absolute cell counts (cells/UL).    Lymphocytes % 01/23/2024 26.3  13.0 - 44.0 % Final    Monocytes % 01/23/2024 4.5  2.0 - 10.0 % Final    Eosinophils % 01/23/2024 1.4  0.0 - 6.0 % Final    Basophils % 01/23/2024 0.2  0.0 - 2.0 %  Final    Neutrophils Absolute 01/23/2024 3.46  1.20 - 7.70 x10*3/uL Final    Percent differential counts (%) should be interpreted in the context of the absolute cell counts (cells/uL).    Immature Granulocytes Absolute, Au* 01/23/2024 0.01  0.00 - 0.70 x10*3/uL Final    Lymphocytes Absolute 01/23/2024 1.35  1.20 - 4.80 x10*3/uL Final    Monocytes Absolute 01/23/2024 0.23  0.10 - 1.00 x10*3/uL Final    Eosinophils Absolute 01/23/2024 0.07  0.00 - 0.70 x10*3/uL Final    Basophils Absolute 01/23/2024 0.01  0.00 - 0.10 x10*3/uL Final    Glucose 01/23/2024 104 (H)  74 - 99 mg/dL Final    Sodium 01/23/2024 140  136 - 145 mmol/L Final    Potassium 01/23/2024 3.2 (L)  3.5 - 5.3 mmol/L Final    Chloride 01/23/2024 102  98 - 107 mmol/L Final    Bicarbonate 01/23/2024 23  21 - 32 mmol/L Final    Anion Gap 01/23/2024 18  10 - 20 mmol/L Final    Urea Nitrogen 01/23/2024 12  6 - 23 mg/dL Final    Creatinine 01/23/2024 0.92  0.50 - 1.05 mg/dL Final    eGFR 01/23/2024 74  >60 mL/min/1.73m*2 Final    Calculations of estimated GFR are performed using the 2021 CKD-EPI Study Refit equation without the race variable for the IDMS-Traceable creatinine methods.  https://jasn.asnjournals.org/content/early/2021/09/22/ASN.0130310884    Calcium 01/23/2024 9.1  8.6 - 10.3 mg/dL Final    Albumin 01/23/2024 4.4  3.4 - 5.0 g/dL Final    Alkaline Phosphatase 01/23/2024 53  33 - 110 U/L Final    Total Protein 01/23/2024 6.6  6.4 - 8.2 g/dL Final    AST 01/23/2024 14  9 - 39 U/L Final    Bilirubin, Total 01/23/2024 0.3  0.0 - 1.2 mg/dL Final    ALT 01/23/2024 16  7 - 45 U/L Final    Patients treated with Sulfasalazine may generate falsely decreased results for ALT.    Iron 01/23/2024 161 (H)  35 - 150 ug/dL Final    UIBC 01/23/2024 150  110 - 370 ug/dL Final    TIBC 01/23/2024 311  ug/dL Final    % Saturation 01/23/2024 52  % Final    LDH 01/23/2024 169  84 - 246 U/L Final    Ferritin 01/23/2024 54  8 - 150 ng/mL Final    Vitamin B12 01/23/2024  "1,029 (H)  211 - 911 pg/mL Final    Retic % 01/23/2024 1.5  0.5 - 2.0 % Final    Retic Absolute 01/23/2024 0.055  0.018 - 0.083 x10*6/uL Final    Reticulocyte Hemoglobin 01/23/2024 36  28 - 38 pg Final    Immature Retic fraction 01/23/2024 7.9  <=16.0 % Final    Reticulocytes are measured based on a fluorescent technique. The IRF, or immature reticulocyte fraction, is the percent of reticulocytes that show medium (MFR) or high (HFR) fluorescence.  This value can be used to assess the relative maturity of the reticulocyte population in response to anemia. The \"shift reticulocytes\" are not measured by this technique, eliminating the need for their correction in the reticulocyte index.    Haptoglobin 01/23/2024 192  37 - 246 mg/dL Final    Folate, Serum 01/23/2024 >24.0  >5.0 ng/mL Final    Ig Kappa Free Light Chain 01/23/2024 2.99 (H)  0.33 - 1.94 mg/dL Final    Ig Lambda Free Light Chain 01/23/2024 1.73  0.57 - 2.63 mg/dL Final    Kappa/Lambda Ratio 01/23/2024 1.73 (H)  0.26 - 1.65 Final    IgG 01/23/2024 600 (L)  700 - 1,600 mg/dL Final    IgA 01/23/2024 170  70 - 400 mg/dL Final    IgM 01/23/2024 63  40 - 230 mg/dL Final    Hepatitis B Surface AG 01/23/2024 Nonreactive  Nonreactive Final    Biotin interference may cause falsely decreased results. Patients taking a Biotin dose of up to 5 mg/day should refrain from taking Biotin for 24 hours before sample collection. Providers may contact their local laboratory for  further information.    Hepatitis C AB 01/23/2024 Nonreactive  Nonreactive Final    Results from patients taking biotin supplements or receiving high-dose biotin therapy should be interpreted with caution due to possible interference with this test. Providers may contact their local laboratory for further information.    Hepatitis B Surface AB 01/23/2024 26.8 (H)  <10.0 mIU/mL Final    Interpretive Criteria:                         <10 mIU/mL    Nonreactive                          >=10 mIU/mL    Reactive "     Biotin interference may cause falsely decreased results. Patients taking a Biotin dose of up to 5 mg/day should refrain from taking Biotin for 24 hours before sample collection. Providers may contact their local laboratory for further information.    Hepatitis B Core AB- Total 01/23/2024 Nonreactive  Nonreactive Final    Hepatitis A  AB-Total 01/23/2024 Reactive (A)  Nonreactive Final    Total Protein 01/23/2024 6.4  6.4 - 8.2 g/dL Final    Albumin 01/23/2024 4.1  3.4 - 5.0 g/dL Final    Alpha 1 Globulin 01/23/2024 0.3  0.2 - 0.6 g/dL Final    Alpha 2 Globulin 01/23/2024 0.7  0.4 - 1.1 g/dL Final    Beta Globulin 01/23/2024 0.7  0.5 - 1.2 g/dL Final    Gamma 01/23/2024 0.5  0.5 - 1.4 g/dL Final    Protein Electrophoresis Comment 01/23/2024 Normal.   Final    Immunofixation Comment 01/23/2024 No monoclonal protein detected by immunofixation.   Final    Path Review - Serum Protein Electr* 01/23/2024 Reviewed and approved by ODALIS THOMAS on 1/29/24 at 10:23 AM.       Final    Path Review - Serum Immunofixation 01/23/2024 Reviewed and approved by ODALIS THOMAS on 1/29/24 at 10:23 AM.       Final   Lab on 12/20/2023   Component Date Value Ref Range Status    Glucose 12/20/2023 95  74 - 99 mg/dL Final    Sodium 12/20/2023 145  136 - 145 mmol/L Final    Potassium 12/20/2023 4.6  3.5 - 5.3 mmol/L Final    Chloride 12/20/2023 109 (H)  98 - 107 mmol/L Final    Bicarbonate 12/20/2023 30  21 - 32 mmol/L Final    Anion Gap 12/20/2023 11  10 - 20 mmol/L Final    Urea Nitrogen 12/20/2023 11  6 - 23 mg/dL Final    Creatinine 12/20/2023 0.71  0.50 - 1.05 mg/dL Final    eGFR 12/20/2023 >90  >60 mL/min/1.73m*2 Final    Calculations of estimated GFR are performed using the 2021 CKD-EPI Study Refit equation without the race variable for the IDMS-Traceable creatinine methods.  https://jasn.asnjournals.org/content/early/2021/09/22/ASN.5271551114    Calcium 12/20/2023 9.2  8.6 - 10.6 mg/dL Final    WBC 12/20/2023 8.0  4.4 - 11.3  x10*3/uL Final    nRBC 12/20/2023 0.0  0.0 - 0.0 /100 WBCs Final    RBC 12/20/2023 3.38 (L)  4.00 - 5.20 x10*6/uL Final    Hemoglobin 12/20/2023 10.9 (L)  12.0 - 16.0 g/dL Final    Hematocrit 12/20/2023 32.2 (L)  36.0 - 46.0 % Final    MCV 12/20/2023 95  80 - 100 fL Final    MCH 12/20/2023 32.2  26.0 - 34.0 pg Final    MCHC 12/20/2023 33.9  32.0 - 36.0 g/dL Final    RDW 12/20/2023 12.4  11.5 - 14.5 % Final    Platelets 12/20/2023 348  150 - 450 x10*3/uL Final   Admission on 12/15/2023, Discharged on 12/19/2023   Component Date Value Ref Range Status    Lipase 12/15/2023 56  9 - 82 U/L Final    Color, Urine 12/15/2023 Yellow  Straw, Yellow Final    Appearance, Urine 12/15/2023 Hazy (N)  Clear Final    Specific Gravity, Urine 12/15/2023 1.017  1.005 - 1.035 Final    pH, Urine 12/15/2023 5.0  5.0, 5.5, 6.0, 6.5, 7.0, 7.5, 8.0 Final    Protein, Urine 12/15/2023 NEGATIVE  NEGATIVE mg/dL Final    Glucose, Urine 12/15/2023 NEGATIVE  NEGATIVE mg/dL Final    Blood, Urine 12/15/2023 NEGATIVE  NEGATIVE Final    Ketones, Urine 12/15/2023 NEGATIVE  NEGATIVE mg/dL Final    Bilirubin, Urine 12/15/2023 NEGATIVE  NEGATIVE Final    Urobilinogen, Urine 12/15/2023 <2.0  <2.0 mg/dL Final    Nitrite, Urine 12/15/2023 NEGATIVE  NEGATIVE Final    Leukocyte Esterase, Urine 12/15/2023 SMALL (1+) (A)  NEGATIVE Final    Lactate 12/15/2023 1.0  0.4 - 2.0 mmol/L Final    Glucose 12/15/2023 89  74 - 99 mg/dL Final    Sodium 12/15/2023 140  136 - 145 mmol/L Final    Potassium 12/15/2023 3.1 (L)  3.5 - 5.3 mmol/L Final    Chloride 12/15/2023 101  98 - 107 mmol/L Final    Bicarbonate 12/15/2023 26  21 - 32 mmol/L Final    Anion Gap 12/15/2023 16  10 - 20 mmol/L Final    Urea Nitrogen 12/15/2023 26 (H)  6 - 23 mg/dL Final    Creatinine 12/15/2023 1.40 (H)  0.50 - 1.05 mg/dL Final    eGFR 12/15/2023 45 (L)  >60 mL/min/1.73m*2 Final    Calculations of estimated GFR are performed using the 2021 CKD-EPI Study Refit equation without the race variable for  the IDMS-Traceable creatinine methods.  https://jasn.asnjournals.org/content/early/2021/09/22/ASN.5683200454    Calcium 12/15/2023 9.6  8.6 - 10.3 mg/dL Final    Albumin 12/15/2023 4.6  3.4 - 5.0 g/dL Final    Alkaline Phosphatase 12/15/2023 58  33 - 110 U/L Final    Total Protein 12/15/2023 7.1  6.4 - 8.2 g/dL Final    AST 12/15/2023 19  9 - 39 U/L Final    Bilirubin, Total 12/15/2023 0.3  0.0 - 1.2 mg/dL Final    ALT 12/15/2023 21  7 - 45 U/L Final    Patients treated with Sulfasalazine may generate falsely decreased results for ALT.    WBC 12/15/2023 8.4  4.4 - 11.3 x10*3/uL Final    nRBC 12/15/2023 0.0  0.0 - 0.0 /100 WBCs Final    RBC 12/15/2023 3.75 (L)  4.00 - 5.20 x10*6/uL Final    Hemoglobin 12/15/2023 11.7 (L)  12.0 - 16.0 g/dL Final    Hematocrit 12/15/2023 34.5 (L)  36.0 - 46.0 % Final    MCV 12/15/2023 92  80 - 100 fL Final    MCH 12/15/2023 31.2  26.0 - 34.0 pg Final    MCHC 12/15/2023 33.9  32.0 - 36.0 g/dL Final    RDW 12/15/2023 12.5  11.5 - 14.5 % Final    Platelets 12/15/2023 380  150 - 450 x10*3/uL Final    Neutrophils % 12/15/2023 59.7  40.0 - 80.0 % Final    Immature Granulocytes %, Automated 12/15/2023 0.2  0.0 - 0.9 % Final    Immature Granulocyte Count (IG) includes promyelocytes, myelocytes and metamyelocytes but does not include bands. Percent differential counts (%) should be interpreted in the context of the absolute cell counts (cells/UL).    Lymphocytes % 12/15/2023 33.2  13.0 - 44.0 % Final    Monocytes % 12/15/2023 5.8  2.0 - 10.0 % Final    Eosinophils % 12/15/2023 0.7  0.0 - 6.0 % Final    Basophils % 12/15/2023 0.4  0.0 - 2.0 % Final    Neutrophils Absolute 12/15/2023 5.04  1.20 - 7.70 x10*3/uL Final    Percent differential counts (%) should be interpreted in the context of the absolute cell counts (cells/uL).    Immature Granulocytes Absolute, Au* 12/15/2023 0.02  0.00 - 0.70 x10*3/uL Final    Lymphocytes Absolute 12/15/2023 2.80  1.20 - 4.80 x10*3/uL Final    Monocytes Absolute  12/15/2023 0.49  0.10 - 1.00 x10*3/uL Final    Eosinophils Absolute 12/15/2023 0.06  0.00 - 0.70 x10*3/uL Final    Basophils Absolute 12/15/2023 0.03  0.00 - 0.10 x10*3/uL Final    WBC, Urine 12/15/2023 6-10 (A)  1-5, NONE /HPF Final    RBC, Urine 12/15/2023 1-2  NONE, 1-2, 3-5 /HPF Final    Squamous Epithelial Cells, Urine 12/15/2023 1-9 (SPARSE)  Reference range not established. /HPF Final    Mucus, Urine 12/15/2023 FEW  Reference range not established. /LPF Final    Hyaline Casts, Urine 12/15/2023 4+ (A)  NONE /LPF Final    WBC 12/16/2023 6.5  4.4 - 11.3 x10*3/uL Final    nRBC 12/16/2023 0.0  0.0 - 0.0 /100 WBCs Final    RBC 12/16/2023 3.20 (L)  4.00 - 5.20 x10*6/uL Final    Hemoglobin 12/16/2023 10.1 (L)  12.0 - 16.0 g/dL Final    Hematocrit 12/16/2023 30.7 (L)  36.0 - 46.0 % Final    MCV 12/16/2023 96  80 - 100 fL Final    MCH 12/16/2023 31.6  26.0 - 34.0 pg Final    MCHC 12/16/2023 32.9  32.0 - 36.0 g/dL Final    RDW 12/16/2023 12.7  11.5 - 14.5 % Final    Platelets 12/16/2023 298  150 - 450 x10*3/uL Final    Glucose 12/16/2023 76  74 - 99 mg/dL Final    Sodium 12/16/2023 140  136 - 145 mmol/L Final    Potassium 12/16/2023 4.0  3.5 - 5.3 mmol/L Final    Chloride 12/16/2023 109 (H)  98 - 107 mmol/L Final    Bicarbonate 12/16/2023 23  21 - 32 mmol/L Final    Anion Gap 12/16/2023 12  10 - 20 mmol/L Final    Urea Nitrogen 12/16/2023 15  6 - 23 mg/dL Final    Creatinine 12/16/2023 0.92  0.50 - 1.05 mg/dL Final    Confirmed by repeat analysis    eGFR 12/16/2023 74  >60 mL/min/1.73m*2 Final    Calculations of estimated GFR are performed using the 2021 CKD-EPI Study Refit equation without the race variable for the IDMS-Traceable creatinine methods.  https://jasn.asnjournals.org/content/early/2021/09/22/ASN.2873833585    Calcium 12/16/2023 8.1 (L)  8.6 - 10.3 mg/dL Final    Confirmed by repeat analysis    Phosphorus 12/16/2023 3.0  2.5 - 4.9 mg/dL Final    The performance characteristics of phosphorus testing in  heparinized plasma have been validated by the individual  laboratory site where testing is performed. Testing on heparinized plasma is not approved by the FDA; however, such approval is not necessary.    Albumin 12/16/2023 3.5  3.4 - 5.0 g/dL Final    Magnesium 12/16/2023 1.75  1.60 - 2.40 mg/dL Final    Magnesium 12/17/2023 1.81  1.60 - 2.40 mg/dL Final    Glucose 12/17/2023 84  74 - 99 mg/dL Final    Sodium 12/17/2023 140  136 - 145 mmol/L Final    Potassium 12/17/2023 3.6  3.5 - 5.3 mmol/L Final    Chloride 12/17/2023 109 (H)  98 - 107 mmol/L Final    Bicarbonate 12/17/2023 27  21 - 32 mmol/L Final    Anion Gap 12/17/2023 8 (L)  10 - 20 mmol/L Final    Urea Nitrogen 12/17/2023 13  6 - 23 mg/dL Final    Creatinine 12/17/2023 0.75  0.50 - 1.05 mg/dL Final    eGFR 12/17/2023 >90  >60 mL/min/1.73m*2 Final    Calculations of estimated GFR are performed using the 2021 CKD-EPI Study Refit equation without the race variable for the IDMS-Traceable creatinine methods.  https://jasn.asnjournals.org/content/early/2021/09/22/ASN.2641215424    Calcium 12/17/2023 7.9 (L)  8.6 - 10.3 mg/dL Final    Phosphorus 12/17/2023 1.6 (L)  2.5 - 4.9 mg/dL Final    The performance characteristics of phosphorus testing in heparinized plasma have been validated by the individual  laboratory site where testing is performed. Testing on heparinized plasma is not approved by the FDA; however, such approval is not necessary.    Albumin 12/17/2023 3.2 (L)  3.4 - 5.0 g/dL Final    WBC 12/17/2023 6.8  4.4 - 11.3 x10*3/uL Final    nRBC 12/17/2023 0.0  0.0 - 0.0 /100 WBCs Final    RBC 12/17/2023 2.77 (L)  4.00 - 5.20 x10*6/uL Final    Hemoglobin 12/17/2023 9.0 (L)  12.0 - 16.0 g/dL Final    Hematocrit 12/17/2023 26.0 (L)  36.0 - 46.0 % Final    MCV 12/17/2023 94  80 - 100 fL Final    MCH 12/17/2023 32.5  26.0 - 34.0 pg Final    MCHC 12/17/2023 34.6  32.0 - 36.0 g/dL Final    RDW 12/17/2023 12.4  11.5 - 14.5 % Final    Platelets 12/17/2023 289  150  - 450 x10*3/uL Final    Magnesium 12/18/2023 1.71  1.60 - 2.40 mg/dL Final    Glucose 12/18/2023 89  74 - 99 mg/dL Final    Sodium 12/18/2023 145  136 - 145 mmol/L Final    Potassium 12/18/2023 3.3 (L)  3.5 - 5.3 mmol/L Final    Chloride 12/18/2023 111 (H)  98 - 107 mmol/L Final    Bicarbonate 12/18/2023 28  21 - 32 mmol/L Final    Anion Gap 12/18/2023 9 (L)  10 - 20 mmol/L Final    Urea Nitrogen 12/18/2023 10  6 - 23 mg/dL Final    Creatinine 12/18/2023 0.74  0.50 - 1.05 mg/dL Final    eGFR 12/18/2023 >90  >60 mL/min/1.73m*2 Final    Calculations of estimated GFR are performed using the 2021 CKD-EPI Study Refit equation without the race variable for the IDMS-Traceable creatinine methods.  https://jasn.asnjournals.org/content/early/2021/09/22/ASN.2628307820    Calcium 12/18/2023 7.7 (L)  8.6 - 10.3 mg/dL Final    Phosphorus 12/18/2023 2.7  2.5 - 4.9 mg/dL Final    The performance characteristics of phosphorus testing in heparinized plasma have been validated by the individual  laboratory site where testing is performed. Testing on heparinized plasma is not approved by the FDA; however, such approval is not necessary.    Albumin 12/18/2023 3.2 (L)  3.4 - 5.0 g/dL Final    WBC 12/18/2023 6.7  4.4 - 11.3 x10*3/uL Final    nRBC 12/18/2023 0.0  0.0 - 0.0 /100 WBCs Final    RBC 12/18/2023 3.04 (L)  4.00 - 5.20 x10*6/uL Final    Hemoglobin 12/18/2023 9.5 (L)  12.0 - 16.0 g/dL Final    Hematocrit 12/18/2023 28.5 (L)  36.0 - 46.0 % Final    MCV 12/18/2023 94  80 - 100 fL Final    MCH 12/18/2023 31.3  26.0 - 34.0 pg Final    MCHC 12/18/2023 33.3  32.0 - 36.0 g/dL Final    RDW 12/18/2023 12.5  11.5 - 14.5 % Final    Platelets 12/18/2023 308  150 - 450 x10*3/uL Final    POCT Calcium, Ionized 12/18/2023 1.15  1.1 - 1.33 mmol/L Final    The performance characteristics of ionized calcium tested  in heparinized plasma or serum have been validated by the  individual  laboratory site where testing is performed.   Testing on  heparinized plasma or serum is not approved by   the FDA; however, such approval is not necessary.    Iron 12/18/2023 45  35 - 150 ug/dL Final    UIBC 12/18/2023 164  110 - 370 ug/dL Final    TIBC 12/18/2023 209 (L)  240 - 445 ug/dL Final    % Saturation 12/18/2023 22 (L)  25 - 45 % Final    Folate, Serum 12/18/2023 22.8  >5.0 ng/mL Final    Vitamin B12 12/18/2023 1,153 (H)  211 - 911 pg/mL Final    Transferrin 12/18/2023 176 (L)  200 - 360 mg/dL Final    Ferritin 12/18/2023 62  8 - 150 ng/mL Final    WBC 12/19/2023 7.3  4.4 - 11.3 x10*3/uL Final    nRBC 12/19/2023 0.0  0.0 - 0.0 /100 WBCs Final    RBC 12/19/2023 2.99 (L)  4.00 - 5.20 x10*6/uL Final    Hemoglobin 12/19/2023 9.6 (L)  12.0 - 16.0 g/dL Final    Hematocrit 12/19/2023 27.9 (L)  36.0 - 46.0 % Final    MCV 12/19/2023 93  80 - 100 fL Final    MCH 12/19/2023 32.1  26.0 - 34.0 pg Final    MCHC 12/19/2023 34.4  32.0 - 36.0 g/dL Final    RDW 12/19/2023 12.5  11.5 - 14.5 % Final    Platelets 12/19/2023 293  150 - 450 x10*3/uL Final    Glucose 12/19/2023 100 (H)  74 - 99 mg/dL Final    Sodium 12/19/2023 143  136 - 145 mmol/L Final    Potassium 12/19/2023 3.4 (L)  3.5 - 5.3 mmol/L Final    Chloride 12/19/2023 110 (H)  98 - 107 mmol/L Final    Bicarbonate 12/19/2023 28  21 - 32 mmol/L Final    Anion Gap 12/19/2023 8 (L)  10 - 20 mmol/L Final    Urea Nitrogen 12/19/2023 9  6 - 23 mg/dL Final    Creatinine 12/19/2023 0.72  0.50 - 1.05 mg/dL Final    eGFR 12/19/2023 >90  >60 mL/min/1.73m*2 Final    Calculations of estimated GFR are performed using the 2021 CKD-EPI Study Refit equation without the race variable for the IDMS-Traceable creatinine methods.  https://jasn.asnjournals.org/content/early/2021/09/22/ASN.5482795494    Calcium 12/19/2023 7.5 (L)  8.6 - 10.3 mg/dL Final    Phosphorus 12/19/2023 2.5  2.5 - 4.9 mg/dL Final    The performance characteristics of phosphorus testing in heparinized plasma have been validated by the individual  laboratory site where  "testing is performed. Testing on heparinized plasma is not approved by the FDA; however, such approval is not necessary.    Albumin 12/19/2023 3.2 (L)  3.4 - 5.0 g/dL Final    Magnesium 12/19/2023 1.59 (L)  1.60 - 2.40 mg/dL Final    Troponin I, High Sensitivity 12/19/2023 <3  0 - 13 ng/L Final    Ventricular Rate 01/09/2024 70  BPM Final    Atrial Rate 01/09/2024 70  BPM Final    IN Interval 01/09/2024 128  ms Final    QRS Duration 01/09/2024 84  ms Final    QT Interval 01/09/2024 424  ms Final    QTC Calculation(Bazett) 01/09/2024 457  ms Final    P Axis 01/09/2024 50  degrees Final    R Axis 01/09/2024 57  degrees Final    T Axis 01/09/2024 42  degrees Final    QRS Count 01/09/2024 11  beats Final    Q Onset 01/09/2024 220  ms Final    P Onset 01/09/2024 156  ms Final    P Offset 01/09/2024 204  ms Final    T Offset 01/09/2024 432  ms Final    QTC Fredericia 01/09/2024 446  ms Final    Retic % 12/19/2023 1.5  0.5 - 2.0 % Final    Retic Absolute 12/19/2023 0.046  0.018 - 0.083 x10*6/uL Final    Reticulocyte Hemoglobin 12/19/2023 36  28 - 38 pg Final    Immature Retic fraction 12/19/2023 15.4  <=16.0 % Final    Reticulocytes are measured based on a fluorescent technique. The IRF, or immature reticulocyte fraction, is the percent of reticulocytes that show medium (MFR) or high (HFR) fluorescence.  This value can be used to assess the relative maturity of the reticulocyte population in response to anemia. The \"shift reticulocytes\" are not measured by this technique, eliminating the need for their correction in the reticulocyte index.   Lab on 12/08/2023   Component Date Value Ref Range Status    Cholesterol 12/08/2023 220 (H)  0 - 199 mg/dL Final          Age      Desirable   Borderline High   High     0-19 Y     0 - 169       170 - 199     >/= 200    20-24 Y     0 - 189       190 - 224     >/= 225         >24 Y     0 - 199       200 - 239     >/= 240   **All ranges are based on fasting samples. Specific   " therapeutic targets will vary based on patient-specific   cardiac risk.    Pediatric guidelines reference:Pediatrics 2011, 128(S5).Adult guidelines reference: NCEP ATPIII Guidelines,KARLO 2001, 258:2486-97    Venipuncture immediately after or during the administration of Metamizole may lead to falsely low results. Testing should be performed immediately prior to Metamizole dosing.    HDL-Cholesterol 12/08/2023 60.7  mg/dL Final      Age       Very Low   Low     Normal    High    0-19 Y    < 35      < 40     40-45     ----  20-24 Y    ----     < 40      >45      ----        >24 Y      ----     < 40     40-60      >60      Cholesterol/HDL Ratio 12/08/2023 3.6   Final      Ref Values  Desirable  < 3.4  High Risk  > 5.0    LDL Calculated 12/08/2023 144 (H)  <=99 mg/dL Final                                Near   Borderline      AGE      Desirable  Optimal    High     High     Very High     0-19 Y     0 - 109     ---    110-129   >/= 130     ----    20-24 Y     0 - 119     ---    120-159   >/= 160     ----      >24 Y     0 -  99   100-129  130-159   160-189     >/=190      VLDL 12/08/2023 16  0 - 40 mg/dL Final    Triglycerides 12/08/2023 79  0 - 149 mg/dL Final       Age         Desirable   Borderline High   High     Very High   0 D-90 D    19 - 174         ----         ----        ----  91 D- 9 Y     0 -  74        75 -  99     >/= 100      ----    10-19 Y     0 -  89        90 - 129     >/= 130      ----    20-24 Y     0 - 114       115 - 149     >/= 150      ----         >24 Y     0 - 149       150 - 199    200- 499    >/= 500    Venipuncture immediately after or during the administration of Metamizole may lead to falsely low results. Testing should be performed immediately prior to Metamizole dosing.    Non HDL Cholesterol 12/08/2023 159 (H)  0 - 149 mg/dL Final          Age       Desirable   Borderline High   High     Very High     0-19 Y     0 - 119       120 - 144     >/= 145    >/= 160    20-24 Y     0 - 149        150 - 189     >/= 190      ----         >24 Y    30 mg/dL above LDL Cholesterol goal      Lipase 12/08/2023 51  9 - 82 U/L Final    Amylase 12/08/2023 62  29 - 103 U/L Final   Lab on 10/31/2023   Component Date Value Ref Range Status    Glucose 10/31/2023 94  74 - 99 mg/dL Final    Sodium 10/31/2023 143  136 - 145 mmol/L Final    Potassium 10/31/2023 4.0  3.5 - 5.3 mmol/L Final    Chloride 10/31/2023 108 (H)  98 - 107 mmol/L Final    Bicarbonate 10/31/2023 25  21 - 32 mmol/L Final    Anion Gap 10/31/2023 14  10 - 20 mmol/L Final    Urea Nitrogen 10/31/2023 12  6 - 23 mg/dL Final    Creatinine 10/31/2023 0.91  0.50 - 1.05 mg/dL Final    eGFR 10/31/2023 75  >60 mL/min/1.73m*2 Final    Calculations of estimated GFR are performed using the 2021 CKD-EPI Study Refit equation without the race variable for the IDMS-Traceable creatinine methods.  https://jasn.asnjournals.org/content/early/2021/09/22/ASN.2261615654    Calcium 10/31/2023 10.0  8.6 - 10.6 mg/dL Final     Assessment:    53 y.o. female self referred for chronic normocytic anemia after admission for colonic ileus.    Chronic pancreatitis due to ETOH    EPI on PERT and gastroparesis     Plan:    Reviewed and discussed lab, imaging, and pathology results with patient in detail as well as diagnosis, prognosis, and treatment options.    Unremarkable work up for anemia now improving hemoglobin.    Can't r/o MDS or anemia of chronic disease; discussed role of Bmbx.    Recommend following up with GI hasn't seen them over a year to discuss her EPI and gastroparesis.     F/ w/PCP    RTC in 4 months    Patient verbalized understanding, and all her questions were answered to her satisfaction.     30 min spent with patient greater than 50 % of which was spent in consultation, counselling and coordination of care via telehealth.

## 2024-02-14 PROBLEM — R14.0 ABDOMINAL BLOATING: Status: ACTIVE | Noted: 2024-02-14

## 2024-02-14 PROBLEM — S09.90XA CLOSED HEAD INJURY: Status: ACTIVE | Noted: 2024-02-14

## 2024-02-14 PROBLEM — R73.9 HYPERGLYCEMIA: Status: ACTIVE | Noted: 2023-11-03

## 2024-02-14 PROBLEM — R50.9 FEVER: Status: ACTIVE | Noted: 2024-02-14

## 2024-02-14 PROBLEM — M54.59 OTHER LOW BACK PAIN: Status: ACTIVE | Noted: 2022-02-17

## 2024-02-14 PROBLEM — L65.9 ALOPECIA: Status: ACTIVE | Noted: 2024-02-14

## 2024-02-14 PROBLEM — K90.9 STEATORRHEA (HHS-HCC): Status: ACTIVE | Noted: 2024-01-23

## 2024-02-14 PROBLEM — N30.00 ACUTE CYSTITIS: Status: ACTIVE | Noted: 2024-02-14

## 2024-02-14 PROBLEM — I95.9 HYPOTENSION: Status: ACTIVE | Noted: 2024-02-14

## 2024-02-14 PROBLEM — D50.0 IRON DEFICIENCY ANEMIA DUE TO CHRONIC BLOOD LOSS: Status: ACTIVE | Noted: 2024-01-23

## 2024-02-14 PROBLEM — R10.9 ABDOMINAL PAIN: Status: ACTIVE | Noted: 2022-02-17

## 2024-02-14 PROBLEM — R19.7 DIARRHEA: Status: ACTIVE | Noted: 2024-02-14

## 2024-02-14 PROBLEM — K63.5 POLYP OF COLON: Status: ACTIVE | Noted: 2024-02-14

## 2024-02-14 PROBLEM — K31.84 GASTROPARESIS: Status: ACTIVE | Noted: 2023-04-28

## 2024-02-14 PROBLEM — R21 RASH AND OTHER NONSPECIFIC SKIN ERUPTION: Status: ACTIVE | Noted: 2023-07-31

## 2024-02-15 DIAGNOSIS — F41.9 ANXIETY: Primary | ICD-10-CM

## 2024-02-15 RX ORDER — ESCITALOPRAM OXALATE 5 MG/1
5 TABLET ORAL DAILY
Qty: 30 TABLET | Refills: 2 | Status: SHIPPED | OUTPATIENT
Start: 2024-02-15 | End: 2024-03-04 | Stop reason: ALTCHOICE

## 2024-02-15 RX ORDER — PROPRANOLOL HYDROCHLORIDE 20 MG/1
20 TABLET ORAL 3 TIMES DAILY PRN
Qty: 60 TABLET | Refills: 1 | Status: SHIPPED | OUTPATIENT
Start: 2024-02-15 | End: 2024-05-15 | Stop reason: SDUPTHER

## 2024-02-19 ENCOUNTER — PATIENT OUTREACH (OUTPATIENT)
Dept: CARE COORDINATION | Facility: CLINIC | Age: 54
End: 2024-02-19
Payer: COMMERCIAL

## 2024-02-19 NOTE — PROGRESS NOTES
Brief chart review conducted prior to outreach call.  Left msg on VM, provided my contact information for return call.

## 2024-02-27 ENCOUNTER — PATIENT OUTREACH (OUTPATIENT)
Dept: CARE COORDINATION | Facility: CLINIC | Age: 54
End: 2024-02-27
Payer: COMMERCIAL

## 2024-02-27 ENCOUNTER — DOCUMENTATION (OUTPATIENT)
Dept: CARE COORDINATION | Facility: CLINIC | Age: 54
End: 2024-02-27
Payer: COMMERCIAL

## 2024-02-27 NOTE — PROGRESS NOTES
Spoke darrion Apodaca who reports she has been doing well since hospital DC.  Has colonoscopy scheduled for 3.5 and other follow up visits are pending as well.  Denies issues/problems/concerns at this time.  Previously advised to follow up w PCP post hospital DC.  Plan another outreach in a few weeks.

## 2024-03-04 DIAGNOSIS — F41.8 DEPRESSION WITH ANXIETY: Primary | ICD-10-CM

## 2024-03-04 RX ORDER — ESCITALOPRAM OXALATE 10 MG/1
10 TABLET ORAL DAILY
Qty: 90 TABLET | Refills: 3 | Status: SHIPPED | OUTPATIENT
Start: 2024-03-04 | End: 2024-03-18 | Stop reason: SDUPTHER

## 2024-03-05 ENCOUNTER — OFFICE VISIT (OUTPATIENT)
Dept: GASTROENTEROLOGY | Facility: EXTERNAL LOCATION | Age: 54
End: 2024-03-05
Payer: COMMERCIAL

## 2024-03-05 DIAGNOSIS — R11.0 NAUSEA IN ADULT: ICD-10-CM

## 2024-03-05 DIAGNOSIS — R10.13 ABDOMINAL PAIN, EPIGASTRIC: ICD-10-CM

## 2024-03-05 DIAGNOSIS — R63.4 LOSS OF WEIGHT: ICD-10-CM

## 2024-03-05 DIAGNOSIS — R19.7 DIARRHEA, UNSPECIFIED TYPE: ICD-10-CM

## 2024-03-05 DIAGNOSIS — K31.84 GASTROPARESIS: ICD-10-CM

## 2024-03-05 DIAGNOSIS — K29.50 CHRONIC GASTRITIS WITHOUT BLEEDING, UNSPECIFIED GASTRITIS TYPE: ICD-10-CM

## 2024-03-05 DIAGNOSIS — R12 HEARTBURN: Primary | ICD-10-CM

## 2024-03-05 DIAGNOSIS — D50.9 IRON DEFICIENCY ANEMIA, UNSPECIFIED IRON DEFICIENCY ANEMIA TYPE: ICD-10-CM

## 2024-03-05 PROCEDURE — 43239 EGD BIOPSY SINGLE/MULTIPLE: CPT | Performed by: INTERNAL MEDICINE

## 2024-03-05 PROCEDURE — 45380 COLONOSCOPY AND BIOPSY: CPT | Performed by: INTERNAL MEDICINE

## 2024-03-05 PROCEDURE — 88305 TISSUE EXAM BY PATHOLOGIST: CPT

## 2024-03-05 PROCEDURE — 1036F TOBACCO NON-USER: CPT | Performed by: INTERNAL MEDICINE

## 2024-03-05 PROCEDURE — 88305 TISSUE EXAM BY PATHOLOGIST: CPT | Performed by: PATHOLOGY

## 2024-03-07 ENCOUNTER — LAB REQUISITION (OUTPATIENT)
Dept: LAB | Facility: HOSPITAL | Age: 54
End: 2024-03-07
Payer: COMMERCIAL

## 2024-03-12 LAB
LABORATORY COMMENT REPORT: NORMAL
PATH REPORT.FINAL DX SPEC: NORMAL
PATH REPORT.GROSS SPEC: NORMAL
PATH REPORT.RELEVANT HX SPEC: NORMAL
PATH REPORT.TOTAL CANCER: NORMAL

## 2024-03-13 ENCOUNTER — PATIENT OUTREACH (OUTPATIENT)
Dept: CARE COORDINATION | Facility: CLINIC | Age: 54
End: 2024-03-13
Payer: COMMERCIAL

## 2024-03-13 NOTE — RESULT ENCOUNTER NOTE
Dear Paulie:    The samples taken from your stomach showed NO evidence of  H.pylori infection, and those taken from your duodenum showed NO evidence of celiac disease.    The samples taken from  your colon showed NO evidence of microscopic colitis.    I'm glad that everything went so smoothly!    Kyree Storm Jr., MD I

## 2024-03-13 NOTE — PROGRESS NOTES
Spoke w Missy, post hospital DC (1.24 to 1.27.24).  Reports she is doing very well with good po intake.  Did have EGD/colonoscopy on 3.5.24 as planned.  Denies needs; encouraged to call should future issues/problems/concerns arise.

## 2024-03-18 DIAGNOSIS — F41.8 DEPRESSION WITH ANXIETY: ICD-10-CM

## 2024-03-18 RX ORDER — ESCITALOPRAM OXALATE 20 MG/1
20 TABLET ORAL DAILY
Qty: 90 TABLET | Refills: 1 | Status: SHIPPED | OUTPATIENT
Start: 2024-03-18

## 2024-03-21 DIAGNOSIS — R11.0 NAUSEA IN ADULT: Primary | ICD-10-CM

## 2024-03-21 RX ORDER — ONDANSETRON 8 MG/1
8 TABLET, ORALLY DISINTEGRATING ORAL EVERY 8 HOURS PRN
Qty: 20 TABLET | Refills: 2 | Status: SHIPPED | OUTPATIENT
Start: 2024-03-21

## 2024-04-22 DIAGNOSIS — E03.8 SECONDARY HYPOTHYROIDISM: ICD-10-CM

## 2024-04-22 DIAGNOSIS — K21.9 GASTROESOPHAGEAL REFLUX DISEASE, UNSPECIFIED WHETHER ESOPHAGITIS PRESENT: ICD-10-CM

## 2024-04-22 DIAGNOSIS — E87.6 HYPOKALEMIA: Primary | ICD-10-CM

## 2024-04-22 RX ORDER — OMEPRAZOLE 40 MG/1
40 CAPSULE, DELAYED RELEASE ORAL
Qty: 90 CAPSULE | Refills: 3 | Status: SHIPPED | OUTPATIENT
Start: 2024-04-22 | End: 2025-04-17

## 2024-04-22 RX ORDER — POTASSIUM CHLORIDE 20 MEQ/1
20 TABLET, EXTENDED RELEASE ORAL DAILY
Qty: 90 TABLET | Refills: 3 | Status: SHIPPED | OUTPATIENT
Start: 2024-04-22

## 2024-04-25 DIAGNOSIS — B37.9 CANDIDIASIS: Primary | ICD-10-CM

## 2024-04-25 RX ORDER — FLUCONAZOLE 150 MG/1
150 TABLET ORAL ONCE
Qty: 1 TABLET | Refills: 1 | Status: SHIPPED | OUTPATIENT
Start: 2024-04-25 | End: 2024-04-25

## 2024-04-30 ENCOUNTER — LAB (OUTPATIENT)
Dept: LAB | Facility: LAB | Age: 54
End: 2024-04-30
Payer: COMMERCIAL

## 2024-04-30 DIAGNOSIS — E03.8 SECONDARY HYPOTHYROIDISM: ICD-10-CM

## 2024-04-30 LAB — TSH SERPL-ACNC: 0.47 MIU/L (ref 0.44–3.98)

## 2024-04-30 PROCEDURE — 84443 ASSAY THYROID STIM HORMONE: CPT

## 2024-04-30 PROCEDURE — 36415 COLL VENOUS BLD VENIPUNCTURE: CPT

## 2024-05-01 DIAGNOSIS — G25.81 RESTLESS LEGS SYNDROME: ICD-10-CM

## 2024-05-01 DIAGNOSIS — E03.8 SECONDARY HYPOTHYROIDISM: ICD-10-CM

## 2024-05-01 RX ORDER — LEVOTHYROXINE SODIUM 75 UG/1
75 TABLET ORAL DAILY
Qty: 90 TABLET | Refills: 1 | Status: SHIPPED | OUTPATIENT
Start: 2024-05-01 | End: 2024-05-01 | Stop reason: SDUPTHER

## 2024-05-01 RX ORDER — LEVOTHYROXINE SODIUM 75 UG/1
75 TABLET ORAL DAILY
Qty: 90 TABLET | Refills: 1 | Status: SHIPPED | OUTPATIENT
Start: 2024-05-01 | End: 2025-05-01

## 2024-05-01 RX ORDER — GABAPENTIN 300 MG/1
CAPSULE ORAL
Qty: 360 CAPSULE | Refills: 3 | Status: SHIPPED | OUTPATIENT
Start: 2024-05-01 | End: 2024-05-17 | Stop reason: SDUPTHER

## 2024-05-07 ENCOUNTER — OFFICE VISIT (OUTPATIENT)
Dept: DERMATOLOGY | Facility: CLINIC | Age: 54
End: 2024-05-07
Payer: COMMERCIAL

## 2024-05-07 DIAGNOSIS — L70.0 SUPERFICIAL ACNE VULGARIS: Primary | ICD-10-CM

## 2024-05-07 DIAGNOSIS — L64.9 ANDROGENETIC ALOPECIA: ICD-10-CM

## 2024-05-07 DIAGNOSIS — L82.1 SEBORRHEIC KERATOSIS: ICD-10-CM

## 2024-05-07 DIAGNOSIS — L82.0 INFLAMED SEBORRHEIC KERATOSIS: ICD-10-CM

## 2024-05-07 PROCEDURE — 99214 OFFICE O/P EST MOD 30 MIN: CPT | Performed by: NURSE PRACTITIONER

## 2024-05-07 PROCEDURE — 17110 DESTRUCTION B9 LES UP TO 14: CPT | Performed by: NURSE PRACTITIONER

## 2024-05-07 PROCEDURE — 1036F TOBACCO NON-USER: CPT | Performed by: NURSE PRACTITIONER

## 2024-05-07 RX ORDER — BENZOYL PEROXIDE 50 MG/ML
LIQUID TOPICAL
Qty: 236 G | Refills: 2 | Status: SHIPPED | OUTPATIENT
Start: 2024-05-07

## 2024-05-07 RX ORDER — CLINDAMYCIN PHOSPHATE 11.9 MG/ML
SOLUTION TOPICAL
Qty: 60 ML | Refills: 3 | Status: SHIPPED | OUTPATIENT
Start: 2024-05-07

## 2024-05-07 RX ORDER — SPIRONOLACTONE 100 MG/1
TABLET, FILM COATED ORAL
Qty: 90 TABLET | Refills: 3 | Status: SHIPPED | OUTPATIENT
Start: 2024-05-07

## 2024-05-07 NOTE — PROGRESS NOTES
Subjective     Paulie Byrd is a 53 y.o. female who presents for the following: Suspicious Skin Lesion (scalp).     Scalp lesion is very irritated and itchy.     Review of Systems:  No other skin or systemic complaints other than what is documented elsewhere in the note.    The following portions of the chart were reviewed this encounter and updated as appropriate:   Tobacco  Allergies  Meds  Problems  Med Hx  Surg Hx         Skin Cancer History  No skin cancer on file.      Specialty Problems          Dermatology Problems    Rash and other nonspecific skin eruption    Hair loss    Hemangioma of skin and subcutaneous tissue    Melanocytic nevi of trunk    Melanocytic nevi of unspecified lower limb, including hip    Melanocytic nevi of unspecified upper limb, including shoulder    Other hypertrophic disorders of the skin    Other melanin hyperpigmentation    Other seborrheic keratosis    Scar condition and fibrosis of skin    Telogen effluvium    Atypical mole    Alopecia        Objective   Well appearing patient in no apparent distress; mood and affect are within normal limits.    A focused skin examination was performed. All findings within normal limits unless otherwise noted below.    Assessment/Plan   1. Superficial acne vulgaris  Head - Anterior (Face)  Erythematous papulopustules a few <5 mm subcutaneous tender nodules noted.     PLAN    AM/PM: Wash face with benzoyl peroxide wash rinse after 2 minutes. Then apply clindamycin lotion to affected areas    clindamycin (Cleocin T) 1 % external solution - Head - Anterior (Face)  Apply to affected areas twice daily.    benzoyl peroxide (Advanced Exfoliating Cleanser) 5 % external wash - Head - Anterior (Face)  Apply to affected areas of skin as a wash, let set for 2 minutes, rinse thoroughly. Use daily in AM    2. Androgenetic alopecia  Scalp  Mild thinning of the hair.     This is a 53 y.o. female  following up for AGA. Hair is stable at this time. BP  105/75. Patient denies lightheadedness, dizziness, irregular spotting, breast tenderness. K checked 2/2024, stable. Plan is to continue with spironolactone 100 mg daily. .          Related Procedures  Follow Up In Dermatology - Established Patient    Related Medications  spironolactone (Aldactone) 100 mg tablet  Take one pill by mouth daily    3. Seborrheic keratosis  Left Temple  Stuck on verrucous, tan-brown papules and plaques.      Seborrheic keratoses are common noncancerous (benign) growths of unknown cause seen in adults due to a thickening of an area of the top skin layer. Seborrheic keratoses may appear as if they are stuck on to the skin. They have distinct borders, and they may appear as papules (small, solid bumps) or plaques (solid, raised patches that are bigger than a thumbnail). They may be the same color as your skin, or they may be pink, light brown, darker brown, or very dark brown, or sometimes may appear black.    There is no way to prevent new seborrheic keratoses from forming. Seborrheic keratoses can be removed, but removal is considered a cosmetic issue and is usually not covered by insurance.    PLAN  No treatment is needed unless there is irritation from clothing, such as itching or bleeding.  2.   Some lotions containing alpha hydroxy acids, salicylic acid, or urea may make the areas feel smoother with regular use but will not eliminate them.    4. Inflamed seborrheic keratosis  Left Occipital Scalp  Erythematous excoriated crusty verrucal papule(s) and/or plaque(s)    Inflamed Seborrheic Keratosis  - Benign nature of lesion(s) discussed with patient, and reassurance provided.  - Given the lesion (s) are very itchy or irritated, the patient is seeking removal and treatment with liquid nitrogen cryotherapy in clinic today was recommended.  - The patient expressed understanding, is in agreement with this plan, and wishes to proceed with liquid nitrogen cryotherapy as documented  above.    Destr of lesion - Left Occipital Scalp  Complexity: simple    Destruction method: cryotherapy    Timeout:  patient name, date of birth, surgical site, and procedure verified  Lesion destroyed using liquid nitrogen: Yes    Region frozen until ice ball extended beyond lesion: Yes    Cryotherapy cycles:  3  Outcome: patient tolerated procedure well with no complications          Return in 6 months for routine check or return to clinic sooner if needed

## 2024-05-15 DIAGNOSIS — F41.9 ANXIETY: ICD-10-CM

## 2024-05-15 RX ORDER — PROPRANOLOL HYDROCHLORIDE 20 MG/1
20 TABLET ORAL 3 TIMES DAILY PRN
Qty: 60 TABLET | Refills: 3 | Status: SHIPPED | OUTPATIENT
Start: 2024-05-15

## 2024-05-15 NOTE — TELEPHONE ENCOUNTER
Requested Prescriptions     Pending Prescriptions Disp Refills    propranolol (Inderal) 20 mg tablet 60 tablet 3     Sig: Take 1 tablet (20 mg) by mouth 3 times a day as needed (for anxiety).

## 2024-05-17 DIAGNOSIS — G25.81 RESTLESS LEGS SYNDROME: ICD-10-CM

## 2024-05-17 NOTE — TELEPHONE ENCOUNTER
Requested Prescriptions     Pending Prescriptions Disp Refills    gabapentin (Neurontin) 300 mg capsule 360 capsule 1     Sig: TAKE 1 CAPSULE BY MOUTH EVERY 8 HOURS AND 1 ADDITIONAL CAPSULE AT BEDTIME

## 2024-05-19 RX ORDER — GABAPENTIN 300 MG/1
CAPSULE ORAL
Qty: 360 CAPSULE | Refills: 1 | Status: SHIPPED | OUTPATIENT
Start: 2024-05-19

## 2024-06-04 ENCOUNTER — APPOINTMENT (OUTPATIENT)
Dept: PRIMARY CARE | Facility: CLINIC | Age: 54
End: 2024-06-04
Payer: COMMERCIAL

## 2024-06-07 ENCOUNTER — OFFICE VISIT (OUTPATIENT)
Dept: PRIMARY CARE | Facility: CLINIC | Age: 54
End: 2024-06-07
Payer: COMMERCIAL

## 2024-06-07 ENCOUNTER — LAB (OUTPATIENT)
Dept: LAB | Facility: LAB | Age: 54
End: 2024-06-07
Payer: COMMERCIAL

## 2024-06-07 VITALS
OXYGEN SATURATION: 97 % | DIASTOLIC BLOOD PRESSURE: 60 MMHG | SYSTOLIC BLOOD PRESSURE: 102 MMHG | HEART RATE: 61 BPM | WEIGHT: 140 LBS | BODY MASS INDEX: 23.32 KG/M2 | HEIGHT: 65 IN

## 2024-06-07 DIAGNOSIS — K29.50 CHRONIC GASTRITIS, PRESENCE OF BLEEDING UNSPECIFIED, UNSPECIFIED GASTRITIS TYPE: ICD-10-CM

## 2024-06-07 DIAGNOSIS — G25.81 RESTLESS LEGS SYNDROME: ICD-10-CM

## 2024-06-07 DIAGNOSIS — F41.8 DEPRESSION WITH ANXIETY: ICD-10-CM

## 2024-06-07 DIAGNOSIS — K90.9 IDIOPATHIC STEATORRHEA (HHS-HCC): ICD-10-CM

## 2024-06-07 DIAGNOSIS — F31.81 BIPOLAR 2 DISORDER (MULTI): Primary | ICD-10-CM

## 2024-06-07 DIAGNOSIS — K56.7 ILEUS (MULTI): ICD-10-CM

## 2024-06-07 DIAGNOSIS — F19.11 HISTORY OF SUBSTANCE ABUSE (MULTI): ICD-10-CM

## 2024-06-07 DIAGNOSIS — R30.0 DYSURIA: ICD-10-CM

## 2024-06-07 DIAGNOSIS — J30.2 SEASONAL ALLERGIES: ICD-10-CM

## 2024-06-07 DIAGNOSIS — D50.0 IRON DEFICIENCY ANEMIA DUE TO CHRONIC BLOOD LOSS: ICD-10-CM

## 2024-06-07 DIAGNOSIS — Z01.419 ENCOUNTER FOR CERVICAL PAP SMEAR WITH PELVIC EXAM: ICD-10-CM

## 2024-06-07 DIAGNOSIS — Z12.31 ENCOUNTER FOR SCREENING MAMMOGRAM FOR MALIGNANT NEOPLASM OF BREAST: ICD-10-CM

## 2024-06-07 LAB
APPEARANCE UR: CLEAR
BASOPHILS # BLD AUTO: 0.05 X10*3/UL (ref 0–0.1)
BASOPHILS NFR BLD AUTO: 0.6 %
BILIRUB UR STRIP.AUTO-MCNC: NEGATIVE MG/DL
COLOR UR: YELLOW
EOSINOPHIL # BLD AUTO: 0.11 X10*3/UL (ref 0–0.7)
EOSINOPHIL NFR BLD AUTO: 1.4 %
ERYTHROCYTE [DISTWIDTH] IN BLOOD BY AUTOMATED COUNT: 13.7 % (ref 11.5–14.5)
FERRITIN SERPL-MCNC: 38 NG/ML (ref 8–150)
GLUCOSE UR STRIP.AUTO-MCNC: NORMAL MG/DL
HCT VFR BLD AUTO: 33 % (ref 36–46)
HGB BLD-MCNC: 10.8 G/DL (ref 12–16)
IGA SERPL-MCNC: 161 MG/DL (ref 70–400)
IGG SERPL-MCNC: 623 MG/DL (ref 700–1600)
IGM SERPL-MCNC: 65 MG/DL (ref 40–230)
IMM GRANULOCYTES # BLD AUTO: 0.03 X10*3/UL (ref 0–0.7)
IMM GRANULOCYTES NFR BLD AUTO: 0.4 % (ref 0–0.9)
IRON SATN MFR SERPL: 53 % (ref 25–45)
IRON SERPL-MCNC: 197 UG/DL (ref 35–150)
KETONES UR STRIP.AUTO-MCNC: NEGATIVE MG/DL
LEUKOCYTE ESTERASE UR QL STRIP.AUTO: ABNORMAL
LYMPHOCYTES # BLD AUTO: 2.9 X10*3/UL (ref 1.2–4.8)
LYMPHOCYTES NFR BLD AUTO: 37.4 %
MCH RBC QN AUTO: 32.1 PG (ref 26–34)
MCHC RBC AUTO-ENTMCNC: 32.7 G/DL (ref 32–36)
MCV RBC AUTO: 98 FL (ref 80–100)
MONOCYTES # BLD AUTO: 0.51 X10*3/UL (ref 0.1–1)
MONOCYTES NFR BLD AUTO: 6.6 %
MUCOUS THREADS #/AREA URNS AUTO: NORMAL /LPF
NEUTROPHILS # BLD AUTO: 4.15 X10*3/UL (ref 1.2–7.7)
NEUTROPHILS NFR BLD AUTO: 53.6 %
NITRITE UR QL STRIP.AUTO: NEGATIVE
NRBC BLD-RTO: 0 /100 WBCS (ref 0–0)
PH UR STRIP.AUTO: 5 [PH]
PLATELET # BLD AUTO: 458 X10*3/UL (ref 150–450)
POC APPEARANCE, URINE: CLEAR
POC BILIRUBIN, URINE: NEGATIVE
POC BLOOD, URINE: NEGATIVE
POC COLOR, URINE: YELLOW
POC GLUCOSE, URINE: NEGATIVE MG/DL
POC KETONES, URINE: NEGATIVE MG/DL
POC LEUKOCYTES, URINE: ABNORMAL
POC NITRITE,URINE: NEGATIVE
POC PH, URINE: 5.5 PH
POC PROTEIN, URINE: NEGATIVE MG/DL
POC SPECIFIC GRAVITY, URINE: >=1.03
POC UROBILINOGEN, URINE: 0.2 EU/DL
PROT SERPL-MCNC: 6.5 G/DL (ref 6.4–8.2)
PROT UR STRIP.AUTO-MCNC: NEGATIVE MG/DL
RBC # BLD AUTO: 3.36 X10*6/UL (ref 4–5.2)
RBC # UR STRIP.AUTO: NEGATIVE /UL
RBC #/AREA URNS AUTO: NORMAL /HPF
SP GR UR STRIP.AUTO: 1.02
SQUAMOUS #/AREA URNS AUTO: NORMAL /HPF
TIBC SERPL-MCNC: 370 UG/DL (ref 240–445)
UIBC SERPL-MCNC: 173 UG/DL (ref 110–370)
UROBILINOGEN UR STRIP.AUTO-MCNC: NORMAL MG/DL
WBC # BLD AUTO: 7.8 X10*3/UL (ref 4.4–11.3)
WBC #/AREA URNS AUTO: NORMAL /HPF

## 2024-06-07 PROCEDURE — 81001 URINALYSIS AUTO W/SCOPE: CPT

## 2024-06-07 PROCEDURE — 82728 ASSAY OF FERRITIN: CPT

## 2024-06-07 PROCEDURE — 84165 PROTEIN E-PHORESIS SERUM: CPT

## 2024-06-07 PROCEDURE — 82607 VITAMIN B-12: CPT

## 2024-06-07 PROCEDURE — 86334 IMMUNOFIX E-PHORESIS SERUM: CPT

## 2024-06-07 PROCEDURE — 1036F TOBACCO NON-USER: CPT | Performed by: NURSE PRACTITIONER

## 2024-06-07 PROCEDURE — 81450 HL NEO GSAP 5-50DNA/DNA&RNA: CPT

## 2024-06-07 PROCEDURE — 85025 COMPLETE CBC W/AUTO DIFF WBC: CPT

## 2024-06-07 PROCEDURE — 81003 URINALYSIS AUTO W/O SCOPE: CPT | Performed by: NURSE PRACTITIONER

## 2024-06-07 PROCEDURE — 83550 IRON BINDING TEST: CPT

## 2024-06-07 PROCEDURE — 83521 IG LIGHT CHAINS FREE EACH: CPT

## 2024-06-07 PROCEDURE — 82784 ASSAY IGA/IGD/IGG/IGM EACH: CPT

## 2024-06-07 PROCEDURE — 84155 ASSAY OF PROTEIN SERUM: CPT

## 2024-06-07 PROCEDURE — 87086 URINE CULTURE/COLONY COUNT: CPT

## 2024-06-07 PROCEDURE — 83540 ASSAY OF IRON: CPT

## 2024-06-07 PROCEDURE — 99214 OFFICE O/P EST MOD 30 MIN: CPT | Performed by: NURSE PRACTITIONER

## 2024-06-07 PROCEDURE — 36415 COLL VENOUS BLD VENIPUNCTURE: CPT

## 2024-06-07 ASSESSMENT — ENCOUNTER SYMPTOMS
FATIGUE: 0
DYSPHORIC MOOD: 1
NERVOUS/ANXIOUS: 1
WEAKNESS: 0
SHORTNESS OF BREATH: 0
DIFFICULTY URINATING: 0
TROUBLE SWALLOWING: 0
EYE PAIN: 0
DIARRHEA: 0
COUGH: 0
ABDOMINAL PAIN: 0
SEIZURES: 0
SLEEP DISTURBANCE: 1
DYSURIA: 1
COLOR CHANGE: 0
WHEEZING: 0
DIZZINESS: 0
WOUND: 0
ADENOPATHY: 0
JOINT SWELLING: 0
CONSTIPATION: 0
CHILLS: 0
FEVER: 0
BRUISES/BLEEDS EASILY: 0
NAUSEA: 0
HEADACHES: 0
MYALGIAS: 0
ABDOMINAL DISTENTION: 0
PALPITATIONS: 0

## 2024-06-07 ASSESSMENT — PATIENT HEALTH QUESTIONNAIRE - PHQ9
1. LITTLE INTEREST OR PLEASURE IN DOING THINGS: NOT AT ALL
2. FEELING DOWN, DEPRESSED OR HOPELESS: NOT AT ALL
SUM OF ALL RESPONSES TO PHQ9 QUESTIONS 1 AND 2: 0

## 2024-06-07 ASSESSMENT — COLUMBIA-SUICIDE SEVERITY RATING SCALE - C-SSRS
2. HAVE YOU ACTUALLY HAD ANY THOUGHTS OF KILLING YOURSELF?: NO
6. HAVE YOU EVER DONE ANYTHING, STARTED TO DO ANYTHING, OR PREPARED TO DO ANYTHING TO END YOUR LIFE?: NO
1. IN THE PAST MONTH, HAVE YOU WISHED YOU WERE DEAD OR WISHED YOU COULD GO TO SLEEP AND NOT WAKE UP?: NO

## 2024-06-07 NOTE — ASSESSMENT & PLAN NOTE
Patient agreeable to see in office behavioral health specialist for counseling services.  She would like to continue current medication regiment for now.  Daily Lexapro is currently at maximum dose with frequent propranolol use for anxiety as needed.  Crisis intervention contact information given

## 2024-06-07 NOTE — ASSESSMENT & PLAN NOTE
Patient to continue to utilize over-the-counter antihistamines and decongestants as needed for symptom control.

## 2024-06-07 NOTE — ASSESSMENT & PLAN NOTE
Patient to continue routine follow-up with gastroenterology for continued evaluation and treatment recommendations.  She is to continue on omeprazole daily and with as needed Zofran.

## 2024-06-07 NOTE — PROGRESS NOTES
Subjective   Patient ID: Paulie Byrd is a 53 y.o. female who presents for Establish Care and Follow-up (Med review).    Patient seen today in order to establish primary care.  Patient seen sitting up in office, slightly anxious appearing but overall in no acute distress.  Patient admits to a lot of acute stressors in her life as she just had to put down her family pet.  She states that her  has early onset dementia and has been very upset about the passing of their dog and she is doing her best to console him.  Patient states that her  has a good support system and many individuals that can stay with him as needed.  Patient and spouse currently live with her best friend that is a nurse.  Although patient spouse is supported, patient does not like to burden people with her concerns.  She was previously diagnosed with bipolar disorder.  She stopped taking Wellbutrin as she felt it was no longer effective but continues on Lexapro for anxiety and depression.  She feels like she has been taking as needed propranolol more and more for anxiety issues.  She is very interested in speaking with an office mental health counselor.  Insomnia reported to be well-controlled with the use of a THC gummy.  Patient states that she has intermittent issues with poor appetite.  She attributes this to chronic gastritis and follows routinely with gastroenterology.  She reports no solid bowel movements but states that this is more new normal.  Patient reports burning with urination yesterday and is concerned she may have a urinary tract infection.  She denies any fever, chills, blood in the urine, urgency or frequency.  Patient states that she does her best to stay hydrated and will increase her fluid intake.  She currently uses nicotine vape but states that she only does it while driving.  No reported issues with shortness of breath or cough. Patient states that things are too stressful now and she has no desire to quit  at this time. No reported alcohol use.  Patient currently works as a medical assistant for [employer].  No reported issues with shortness of breath or chest pain with exertion.  Patient takes scheduled gabapentin at bedtime for restless leg syndrome with good effect.  Medications and chart reviewed.  Patient voices no other acute concerns at this time.           Current Outpatient Medications on File Prior to Visit   Medication Sig Dispense Refill    benzoyl peroxide (Advanced Exfoliating Cleanser) 5 % external wash Apply to affected areas of skin as a wash, let set for 2 minutes, rinse thoroughly. Use daily in  g 2    clindamycin (Cleocin T) 1 % external solution Apply to affected areas twice daily. 60 mL 3    escitalopram (Lexapro) 20 mg tablet Take 1 tablet (20 mg) by mouth once daily. 90 tablet 1    gabapentin (Neurontin) 300 mg capsule TAKE 1 CAPSULE BY MOUTH EVERY 8 HOURS AND 1 ADDITIONAL CAPSULE AT BEDTIME 360 capsule 1    levothyroxine (Synthroid, Levoxyl) 75 mcg tablet Take 1 tablet (75 mcg) by mouth once daily. 90 tablet 1    omeprazole (PriLOSEC) 40 mg DR capsule Take 1 capsule (40 mg) by mouth once daily in the morning. Take before meals. Do not crush or chew. 90 capsule 3    ondansetron ODT (Zofran-ODT) 8 mg disintegrating tablet Take 1 tablet (8 mg) by mouth every 8 hours if needed for nausea or vomiting. 20 tablet 2    potassium chloride CR 20 mEq ER tablet Take 1 tablet (20 mEq) by mouth once daily. Do not crush or chew. 90 tablet 3    propranolol (Inderal) 20 mg tablet Take 1 tablet (20 mg) by mouth 3 times a day as needed (for anxiety). 60 tablet 3    spironolactone (Aldactone) 100 mg tablet Take one pill by mouth daily 90 tablet 3    [DISCONTINUED] buPROPion XL (Wellbutrin XL) 150 mg 24 hr tablet Take 1 tablet (150 mg) by mouth once daily in the morning. Do not crush, chew, or split. 30 tablet 3     No current facility-administered medications on file prior to visit.       Past Medical  History:   Diagnosis Date    Bilateral primary osteoarthritis of knee 01/19/2016    Degenerative arthritis of knee, bilateral    Chronic pain syndrome 06/06/2018    Pain syndrome, chronic    Chronic pancreatitis (Multi)     Contusion of lower back and pelvis, subsequent encounter 09/07/2016    Lumbar contusion, subsequent encounter    Encounter for other preprocedural examination 12/02/2016    Pre-op exam    Encounter for other preprocedural examination 01/19/2016    Preoperative evaluation to rule out surgical contraindication    Graves disease     Other abnormality of red blood cells 06/23/2015    MCV - raised    Other tear of medial meniscus, current injury, right knee, subsequent encounter 01/19/2016    Acute medial meniscal tear, right, subsequent encounter    Pain in right knee 01/19/2016    Bilateral knee pain    Personal history of other diseases of the musculoskeletal system and connective tissue 11/11/2015    History of muscle pain    Personal history of other diseases of the musculoskeletal system and connective tissue 12/29/2014    History of low back pain    Personal history of other endocrine, nutritional and metabolic disease 06/06/2018    History of hyperglycemia    Personal history of other specified conditions 12/11/2014    History of lump of right breast    Personal history of other specified conditions 04/22/2015    History of fatigue    Unilateral primary osteoarthritis, left knee 12/02/2016    Degenerative joint disease of knee, left    Unilateral primary osteoarthritis, right knee 03/04/2016    Right knee DJD        Past Surgical History:   Procedure Laterality Date    ADENOIDECTOMY  06/27/2014    Adenoidectomy    CHOLECYSTECTOMY  06/27/2014    Cholecystectomy    HYSTERECTOMY  06/27/2014    Hysterectomy    OTHER SURGICAL HISTORY  06/27/2014    Abdominal Surgery    OTHER SURGICAL HISTORY  09/23/2021    Knee replacement    OTHER SURGICAL HISTORY  09/23/2021    Cyst excision        Family History  "  Problem Relation Name Age of Onset    Allergies Other          Review of Systems   Constitutional:  Negative for chills, fatigue and fever.   HENT:  Negative for dental problem and trouble swallowing.         Positive for persistent fluid in ears   Eyes:  Negative for pain and visual disturbance.        Wears glasses   Respiratory:  Negative for cough, shortness of breath and wheezing.    Cardiovascular:  Negative for chest pain, palpitations and leg swelling.   Gastrointestinal:  Negative for abdominal distention, abdominal pain, constipation, diarrhea and nausea.        Positive for chronic gastritis   Endocrine: Negative for cold intolerance and heat intolerance.   Genitourinary:  Positive for dysuria. Negative for difficulty urinating.   Musculoskeletal:  Negative for gait problem, joint swelling and myalgias.   Skin:  Negative for color change, pallor, rash and wound.   Allergic/Immunologic: Positive for environmental allergies. Negative for food allergies.   Neurological:  Negative for dizziness, seizures, weakness and headaches.   Hematological:  Negative for adenopathy. Does not bruise/bleed easily.   Psychiatric/Behavioral:  Positive for dysphoric mood and sleep disturbance. Negative for behavioral problems. The patient is nervous/anxious.         Positive for BiPoar with anxiety and depression   All other systems reviewed and are negative.      Objective   /60   Pulse 61   Ht 1.651 m (5' 5\")   Wt 63.5 kg (140 lb)   SpO2 97%   BMI 23.30 kg/m²     Physical Exam  Constitutional:       General: She is not in acute distress.     Appearance: Normal appearance. She is not toxic-appearing.   HENT:      Head: Normocephalic and atraumatic.      Right Ear: Ear canal and external ear normal. A middle ear effusion is present.      Left Ear: Ear canal and external ear normal. A middle ear effusion is present.      Nose: Nose normal.      Mouth/Throat:      Mouth: Mucous membranes are moist.      Pharynx: " Oropharynx is clear.   Eyes:      Extraocular Movements: Extraocular movements intact.      Conjunctiva/sclera: Conjunctivae normal.      Pupils: Pupils are equal, round, and reactive to light.   Cardiovascular:      Rate and Rhythm: Normal rate and regular rhythm.      Pulses: Normal pulses.      Heart sounds: Normal heart sounds. No murmur heard.  Pulmonary:      Effort: Pulmonary effort is normal.      Breath sounds: Normal breath sounds. No wheezing.   Abdominal:      General: Abdomen is flat. Bowel sounds are normal.      Palpations: Abdomen is soft.   Musculoskeletal:         General: No swelling. Normal range of motion.      Cervical back: Normal range of motion and neck supple.   Skin:     General: Skin is warm and dry.      Findings: Bruising present.      Comments: Bruising to left thigh   Neurological:      General: No focal deficit present.      Mental Status: She is alert and oriented to person, place, and time. Mental status is at baseline.      Cranial Nerves: No cranial nerve deficit.      Motor: No weakness.   Psychiatric:         Mood and Affect: Mood normal.         Behavior: Behavior normal.         Thought Content: Thought content normal.         Judgment: Judgment normal.       Assessment/Plan   Problem List Items Addressed This Visit             ICD-10-CM    Bipolar 2 disorder (Multi) - Primary F31.81     Patient agreeable to see in office behavioral health specialist for counseling services.  She would like to continue current medication regiment for now.  Daily Lexapro is currently at maximum dose with frequent propranolol use for anxiety as needed.  Crisis intervention contact information given         Relevant Orders    Follow Up In [facility]    Depression with anxiety F41.8    History of substance abuse (Multi) F19.11     Continued cessation from drugs and alcohol encouraged.  Patient states she has been sober for the past 17  years.         Restless legs syndrome G25.81     Reportedly well-controlled on current gabapentin dosing         Gastritis, chronic K29.50     Patient to continue routine follow-up with gastroenterology for continued evaluation and treatment recommendations.  She is to continue on omeprazole daily and with as needed Zofran.         Seasonal allergies J30.2     Patient to continue to utilize over-the-counter antihistamines and decongestants as needed for symptom control.         Dysuria R30.0     UA/UC to be collected today for assessment purposes.  Patient to notify provider for any persistent or worsening urinary concerns.         Relevant Orders    POCT UA Automated manually resulted    Urine Culture    Urinalysis with Reflex Microscopic     Other Visit Diagnoses         Codes    Encounter for screening mammogram for malignant neoplasm of breast     Z12.31    Relevant Orders    BI mammo bilateral screening tomosynthesis    Encounter for cervical Pap smear with pelvic exam     Z01.419    Relevant Orders    Referral to Gynecology

## 2024-06-07 NOTE — PATIENT INSTRUCTIONS
Please arrange for schedule follow-up in 6 months    Psychiatric Services Information: If you are in need of IMMEDIATE CRISIS COUNSELING please call [place] [facility] at [phone] or dial [phone].

## 2024-06-07 NOTE — ASSESSMENT & PLAN NOTE
Continued cessation from drugs and alcohol encouraged.  Patient states she has been sober for the past 17 years.

## 2024-06-07 NOTE — ASSESSMENT & PLAN NOTE
UA/UC to be collected today for assessment purposes.  Patient to notify provider for any persistent or worsening urinary concerns.   no

## 2024-06-08 LAB — VIT B12 SERPL-MCNC: 1224 PG/ML (ref 211–911)

## 2024-06-09 LAB
BACTERIA UR CULT: NORMAL
KAPPA LC SERPL-MCNC: 3.34 MG/DL (ref 0.33–1.94)
KAPPA LC/LAMBDA SER: 1.36 {RATIO} (ref 0.26–1.65)
LAMBDA LC SERPL-MCNC: 2.45 MG/DL (ref 0.57–2.63)

## 2024-06-11 LAB
ALBUMIN: 4.2 G/DL (ref 3.4–5)
ALPHA 1 GLOBULIN: 0.3 G/DL (ref 0.2–0.6)
ALPHA 2 GLOBULIN: 0.8 G/DL (ref 0.4–1.1)
BETA GLOBULIN: 0.7 G/DL (ref 0.5–1.2)
GAMMA GLOBULIN: 0.5 G/DL (ref 0.5–1.4)
IMMUNOFIXATION COMMENT: NORMAL
PATH REVIEW - SERUM IMMUNOFIXATION: NORMAL
PATH REVIEW-SERUM PROTEIN ELECTROPHORESIS: NORMAL
PROTEIN ELECTROPHORESIS COMMENT: NORMAL

## 2024-06-13 ENCOUNTER — APPOINTMENT (OUTPATIENT)
Dept: HEMATOLOGY/ONCOLOGY | Facility: CLINIC | Age: 54
End: 2024-06-13
Payer: COMMERCIAL

## 2024-06-14 LAB
ELECTRONICALLY SIGNED BY: NORMAL
MYELOID NGS RESULTS: NORMAL

## 2024-06-26 LAB
ATRIAL RATE: 63 BPM
P AXIS: 36 DEGREES
P OFFSET: 201 MS
P ONSET: 152 MS
PR INTERVAL: 136 MS
Q ONSET: 220 MS
QRS COUNT: 10 BEATS
QRS DURATION: 92 MS
QT INTERVAL: 428 MS
QTC CALCULATION(BAZETT): 437 MS
QTC FREDERICIA: 435 MS
R AXIS: 57 DEGREES
T AXIS: 29 DEGREES
T OFFSET: 434 MS
VENTRICULAR RATE: 63 BPM

## 2024-06-27 ENCOUNTER — OFFICE VISIT (OUTPATIENT)
Dept: HEMATOLOGY/ONCOLOGY | Facility: CLINIC | Age: 54
End: 2024-06-27
Payer: COMMERCIAL

## 2024-06-27 VITALS
OXYGEN SATURATION: 99 % | HEIGHT: 66 IN | SYSTOLIC BLOOD PRESSURE: 112 MMHG | TEMPERATURE: 97.5 F | RESPIRATION RATE: 18 BRPM | HEART RATE: 81 BPM | WEIGHT: 140.1 LBS | DIASTOLIC BLOOD PRESSURE: 77 MMHG | BODY MASS INDEX: 22.52 KG/M2

## 2024-06-27 DIAGNOSIS — D50.0 IRON DEFICIENCY ANEMIA DUE TO CHRONIC BLOOD LOSS: ICD-10-CM

## 2024-06-27 DIAGNOSIS — K56.7 ILEUS (MULTI): ICD-10-CM

## 2024-06-27 DIAGNOSIS — K90.9 IDIOPATHIC STEATORRHEA (HHS-HCC): ICD-10-CM

## 2024-06-27 LAB
ERYTHROCYTE [DISTWIDTH] IN BLOOD BY AUTOMATED COUNT: 12.9 % (ref 11.5–14.5)
FERRITIN SERPL-MCNC: 44 NG/ML (ref 8–150)
HCT VFR BLD AUTO: 32.2 % (ref 36–46)
HGB BLD-MCNC: 11 G/DL (ref 12–16)
IRON SATN MFR SERPL: 40 % (ref 25–45)
IRON SERPL-MCNC: 142 UG/DL (ref 35–150)
MCH RBC QN AUTO: 32.6 PG (ref 26–34)
MCHC RBC AUTO-ENTMCNC: 34.2 G/DL (ref 32–36)
MCV RBC AUTO: 96 FL (ref 80–100)
PLATELET # BLD AUTO: 358 X10*3/UL (ref 150–450)
RBC # BLD AUTO: 3.37 X10*6/UL (ref 4–5.2)
TIBC SERPL-MCNC: 359 UG/DL (ref 240–445)
UIBC SERPL-MCNC: 217 UG/DL (ref 110–370)
WBC # BLD AUTO: 6.8 X10*3/UL (ref 4.4–11.3)

## 2024-06-27 PROCEDURE — 36415 COLL VENOUS BLD VENIPUNCTURE: CPT | Performed by: PHYSICIAN ASSISTANT

## 2024-06-27 PROCEDURE — 85027 COMPLETE CBC AUTOMATED: CPT | Performed by: PHYSICIAN ASSISTANT

## 2024-06-27 PROCEDURE — 82728 ASSAY OF FERRITIN: CPT | Performed by: PHYSICIAN ASSISTANT

## 2024-06-27 PROCEDURE — 99214 OFFICE O/P EST MOD 30 MIN: CPT | Performed by: PHYSICIAN ASSISTANT

## 2024-06-27 PROCEDURE — 83540 ASSAY OF IRON: CPT | Performed by: PHYSICIAN ASSISTANT

## 2024-06-27 ASSESSMENT — PAIN SCALES - GENERAL: PAINLEVEL: 0-NO PAIN

## 2024-06-27 NOTE — PROGRESS NOTES
"Reason for Visit  Paulie Byrd is a 53 y.o. female self referred for chronic normocytic anemia after admission for colonic ileus.    Upon review of labs, initially noted to have anemia since 4/2023, hemoglobin in the 9-10 ranges, with her episode of acute pancreatitis. Normal platelet and WBC count.    Admitted in 12/2023 for olonic ileus managed conservatively with NG tube, surgery not required. Showed acute kidney injury likely secondary to prerenal azotemia for which she was given IV fluids.     On assessment, noted to have 200 lbs weight loss after her father death a couple of years ago. Recently, she alternates between diarrhea and constipation. Patient has been diagnosed with pancreatic enzyme insufficiency and takes pancreatic enzymes when she remembers. She was diagnosed with gastroparesis by her former GI but was told the medication to treat this can cause neurological conditions and since her mother had MS she didn't want to take it. She has had EGDs with dilations for dysphagia and c-scopes for hemorroids and polyps. Recently, she feels fatigue and has \"zero appetite\". Denies f/c/night sweats, SOB, CP, n/v/d/abd pain, bleeding from any source, neuropathy, rash or any other complaint at this time.    PMH/PSH: Gastroparesis, chronic pancreatitis due to ETOH, gallstones s/p cholecystectomy, hysterectomy, Graves s/p ablation, now w/hypothyroidism, alopecia, b/l knee replacement.  FH: father w/presumed stomach cancer, MGM-colon cancer  Soc Hx: former smoker, ETOH and drug user, sober/clean x 15 years; MA at , , 2 sons.    History of Present Illness:  Patient presents for follow up. On assessment, reports feeling better but continued fatigue.     Review of Systems: All of the systems have been reviewed and are negative for complaints except what is stated in the HPI and/or past medical history.    Allergies   Allergen Reactions    Codeine GI Upset, Other and Nausea/vomiting    Grass Pollen Unknown " "    Outpatient Medication Profile:  Current Outpatient Medications   Medication Sig Dispense Refill    benzoyl peroxide (Advanced Exfoliating Cleanser) 5 % external wash Apply to affected areas of skin as a wash, let set for 2 minutes, rinse thoroughly. Use daily in  g 2    clindamycin (Cleocin T) 1 % external solution Apply to affected areas twice daily. 60 mL 3    escitalopram (Lexapro) 20 mg tablet Take 1 tablet (20 mg) by mouth once daily. 90 tablet 1    gabapentin (Neurontin) 300 mg capsule TAKE 1 CAPSULE BY MOUTH EVERY 8 HOURS AND 1 ADDITIONAL CAPSULE AT BEDTIME 360 capsule 1    levothyroxine (Synthroid, Levoxyl) 75 mcg tablet Take 1 tablet (75 mcg) by mouth once daily. 90 tablet 1    omeprazole (PriLOSEC) 40 mg DR capsule Take 1 capsule (40 mg) by mouth once daily in the morning. Take before meals. Do not crush or chew. 90 capsule 3    ondansetron ODT (Zofran-ODT) 8 mg disintegrating tablet Take 1 tablet (8 mg) by mouth every 8 hours if needed for nausea or vomiting. 20 tablet 2    potassium chloride CR 20 mEq ER tablet Take 1 tablet (20 mEq) by mouth once daily. Do not crush or chew. 90 tablet 3    propranolol (Inderal) 20 mg tablet Take 1 tablet (20 mg) by mouth 3 times a day as needed (for anxiety). 60 tablet 3    spironolactone (Aldactone) 100 mg tablet Take one pill by mouth daily 90 tablet 3     No current facility-administered medications for this visit.        Vitals and Measurements:       1/26/2024     7:44 PM 1/27/2024    12:08 AM 1/27/2024     3:20 AM 1/27/2024     7:00 AM 1/27/2024    11:00 AM 6/7/2024     8:19 AM 6/27/2024    11:40 AM   Vitals   Systolic 106 116 87 98 102 102 112   Diastolic 59 58 57 58 73 60 77   Heart Rate 62 55 60 59 61 61 81   Temp 36.7 °C (98.1 °F) 36.4 °C (97.5 °F) 36.8 °C (98.2 °F) 36.8 °C (98.2 °F) 36.6 °C (97.9 °F)  36.4 °C (97.5 °F)   Resp 16 16 15 17 17  18   Height (in)      1.651 m (5' 5\") 1.669 m (5' 5.71\")   Weight (lb)      140 140.1   BMI      23.3 kg/m2 " 22.81 kg/m2   BSA (m2)      1.71 m2 1.72 m2   Visit Report      Report Report       Physical Exam:   Constitutional: alert, awake and oriented, not in acute distress   HEENT: moist mucous membranes, normal nose   Neck: supple, no lymphadenopathy   EYES: PERRL, EOM intact, conjunctiva normal  Skin: no jaundice, rash or erythema  Neurological: AAOx3, no gross focal deficit   Psychiatric: normal mood and behavior     Labs:  Lab Results   Component Value Date    WBC 6.8 06/27/2024    NEUTROABS 4.15 06/07/2024    IGABSOL 0.03 06/07/2024    LYMPHSABS 2.90 06/07/2024    MONOSABS 0.51 06/07/2024    EOSABS 0.11 06/07/2024    BASOSABS 0.05 06/07/2024    RBC 3.37 (L) 06/27/2024    MCV 96 06/27/2024    MCHC 34.2 06/27/2024    HGB 11.0 (L) 06/27/2024    HCT 32.2 (L) 06/27/2024     06/27/2024     Lab Results   Component Value Date    RETICCTPCT 1.5 01/23/2024      Lab Results   Component Value Date    CREATININE 0.99 02/09/2024    BUN 13 02/09/2024    EGFR 68 02/09/2024     02/09/2024    K 4.4 02/09/2024     02/09/2024    CO2 26 02/09/2024      Lab Results   Component Value Date    ALT 10 01/27/2024    AST 13 01/27/2024    ALKPHOS 40 01/27/2024    BILITOT <0.2 01/27/2024      Lab Results   Component Value Date    TSH 0.47 04/30/2024     Lab Results   Component Value Date    TSH 0.47 04/30/2024     Lab Results   Component Value Date    IRON 142 06/27/2024    TIBC 359 06/27/2024    FERRITIN 44 06/27/2024      Lab Results   Component Value Date    HISPERQT82 1,224 (H) 06/07/2024      Lab Results   Component Value Date    FOLATE >24.0 01/23/2024     Lab Results   Component Value Date    JOHN NEGATIVE 05/09/2023    SEDRATE 16 06/15/2023      Lab Results   Component Value Date    CRP 0.13 06/15/2023      Lab Results   Component Value Date     01/23/2024     Lab Results   Component Value Date    HAPTOGLOBIN 192 01/23/2024     Lab Results   Component Value Date    SPEP Normal. 06/07/2024     Lab Results    Component Value Date     (L) 06/07/2024    IGM 65 06/07/2024     06/07/2024           Assessment:    53 y.o. female self referred for chronic normocytic anemia after admission for colonic ileus.    Chronic pancreatitis due to ETOH    EPI on PERT and gastroparesis     Plan:    Reviewed and discussed lab, imaging, and pathology results with patient in detail as well as diagnosis, prognosis, and treatment options.    Will plan for Bmbx since myloid panel with mutation. Can't r/o MDS or anemia of chronic disease    F/UP w/PCP    RTC aftre BMBX    Patient verbalized understanding, and all her questions were answered to her satisfaction.     30 min spent with patient greater than 50 % of which was spent in consultation, counselling and coordination of care.

## 2024-07-10 ENCOUNTER — APPOINTMENT (OUTPATIENT)
Dept: PRIMARY CARE | Facility: CLINIC | Age: 54
End: 2024-07-10
Payer: COMMERCIAL

## 2024-07-12 ENCOUNTER — LAB (OUTPATIENT)
Dept: LAB | Facility: LAB | Age: 54
End: 2024-07-12
Payer: COMMERCIAL

## 2024-07-12 DIAGNOSIS — R53.83 FATIGUE, UNSPECIFIED TYPE: ICD-10-CM

## 2024-07-12 DIAGNOSIS — R53.83 FATIGUE, UNSPECIFIED TYPE: Primary | ICD-10-CM

## 2024-07-12 LAB
25(OH)D3 SERPL-MCNC: 36 NG/ML (ref 30–100)
ALBUMIN SERPL BCP-MCNC: 4.8 G/DL (ref 3.4–5)
ALP SERPL-CCNC: 44 U/L (ref 33–110)
ALT SERPL W P-5'-P-CCNC: 16 U/L (ref 7–45)
ANION GAP SERPL CALC-SCNC: 14 MMOL/L (ref 10–20)
AST SERPL W P-5'-P-CCNC: 19 U/L (ref 9–39)
BASOPHILS # BLD AUTO: 0.03 X10*3/UL (ref 0–0.1)
BASOPHILS NFR BLD AUTO: 0.5 %
BILIRUB SERPL-MCNC: 0.3 MG/DL (ref 0–1.2)
BUN SERPL-MCNC: 22 MG/DL (ref 6–23)
CALCIUM SERPL-MCNC: 10.9 MG/DL (ref 8.6–10.6)
CHLORIDE SERPL-SCNC: 104 MMOL/L (ref 98–107)
CO2 SERPL-SCNC: 23 MMOL/L (ref 21–32)
CREAT SERPL-MCNC: 1.61 MG/DL (ref 0.5–1.05)
EGFRCR SERPLBLD CKD-EPI 2021: 38 ML/MIN/1.73M*2
EOSINOPHIL # BLD AUTO: 0.09 X10*3/UL (ref 0–0.7)
EOSINOPHIL NFR BLD AUTO: 1.4 %
ERYTHROCYTE [DISTWIDTH] IN BLOOD BY AUTOMATED COUNT: 11.9 % (ref 11.5–14.5)
GLUCOSE SERPL-MCNC: 97 MG/DL (ref 74–99)
HCT VFR BLD AUTO: 35.3 % (ref 36–46)
HGB BLD-MCNC: 12.1 G/DL (ref 12–16)
IMM GRANULOCYTES # BLD AUTO: 0.01 X10*3/UL (ref 0–0.7)
IMM GRANULOCYTES NFR BLD AUTO: 0.2 % (ref 0–0.9)
IRON SATN MFR SERPL: 21 % (ref 25–45)
IRON SERPL-MCNC: 76 UG/DL (ref 35–150)
LYMPHOCYTES # BLD AUTO: 2.26 X10*3/UL (ref 1.2–4.8)
LYMPHOCYTES NFR BLD AUTO: 36.3 %
MCH RBC QN AUTO: 32.5 PG (ref 26–34)
MCHC RBC AUTO-ENTMCNC: 34.3 G/DL (ref 32–36)
MCV RBC AUTO: 95 FL (ref 80–100)
MONOCYTES # BLD AUTO: 0.52 X10*3/UL (ref 0.1–1)
MONOCYTES NFR BLD AUTO: 8.4 %
NEUTROPHILS # BLD AUTO: 3.31 X10*3/UL (ref 1.2–7.7)
NEUTROPHILS NFR BLD AUTO: 53.2 %
NRBC BLD-RTO: 0 /100 WBCS (ref 0–0)
PLATELET # BLD AUTO: 430 X10*3/UL (ref 150–450)
POTASSIUM SERPL-SCNC: 4.8 MMOL/L (ref 3.5–5.3)
PROT SERPL-MCNC: 7.2 G/DL (ref 6.4–8.2)
RBC # BLD AUTO: 3.72 X10*6/UL (ref 4–5.2)
SODIUM SERPL-SCNC: 136 MMOL/L (ref 136–145)
TIBC SERPL-MCNC: 366 UG/DL (ref 240–445)
TSH SERPL-ACNC: 1.04 MIU/L (ref 0.44–3.98)
UIBC SERPL-MCNC: 290 UG/DL (ref 110–370)
WBC # BLD AUTO: 6.2 X10*3/UL (ref 4.4–11.3)

## 2024-07-12 PROCEDURE — 85025 COMPLETE CBC W/AUTO DIFF WBC: CPT

## 2024-07-12 PROCEDURE — 82306 VITAMIN D 25 HYDROXY: CPT

## 2024-07-12 PROCEDURE — 83550 IRON BINDING TEST: CPT

## 2024-07-12 PROCEDURE — 84443 ASSAY THYROID STIM HORMONE: CPT

## 2024-07-12 PROCEDURE — 36415 COLL VENOUS BLD VENIPUNCTURE: CPT

## 2024-07-12 PROCEDURE — 83540 ASSAY OF IRON: CPT

## 2024-07-12 PROCEDURE — 80053 COMPREHEN METABOLIC PANEL: CPT

## 2024-07-12 NOTE — PROGRESS NOTES
Patient requesting lab work due to increased fatigue.  Orders placed at this time and patient to be updated.

## 2024-07-16 ENCOUNTER — HOSPITAL ENCOUNTER (OUTPATIENT)
Dept: RADIOLOGY | Facility: HOSPITAL | Age: 54
Discharge: HOME | End: 2024-07-16
Payer: COMMERCIAL

## 2024-07-16 VITALS
HEART RATE: 62 BPM | RESPIRATION RATE: 18 BRPM | HEIGHT: 64 IN | SYSTOLIC BLOOD PRESSURE: 87 MMHG | TEMPERATURE: 97.2 F | DIASTOLIC BLOOD PRESSURE: 59 MMHG | BODY MASS INDEX: 23.73 KG/M2 | OXYGEN SATURATION: 95 % | WEIGHT: 139 LBS

## 2024-07-16 DIAGNOSIS — D50.0 IRON DEFICIENCY ANEMIA DUE TO CHRONIC BLOOD LOSS: ICD-10-CM

## 2024-07-16 DIAGNOSIS — K56.7 ILEUS (MULTI): ICD-10-CM

## 2024-07-16 DIAGNOSIS — K90.9 IDIOPATHIC STEATORRHEA (HHS-HCC): ICD-10-CM

## 2024-07-16 LAB
BASOPHILS # BLD AUTO: 0.04 X10*3/UL (ref 0–0.1)
BASOPHILS NFR BLD AUTO: 0.5 %
EOSINOPHIL # BLD AUTO: 0.16 X10*3/UL (ref 0–0.7)
EOSINOPHIL NFR BLD AUTO: 1.9 %
ERYTHROCYTE [DISTWIDTH] IN BLOOD BY AUTOMATED COUNT: 11.7 % (ref 11.5–14.5)
HCT VFR BLD AUTO: 34 % (ref 36–46)
HGB BLD-MCNC: 12.1 G/DL (ref 12–16)
IMM GRANULOCYTES # BLD AUTO: 0.02 X10*3/UL (ref 0–0.7)
IMM GRANULOCYTES NFR BLD AUTO: 0.2 % (ref 0–0.9)
LYMPHOCYTES # BLD AUTO: 3.25 X10*3/UL (ref 1.2–4.8)
LYMPHOCYTES NFR BLD AUTO: 38.9 %
MCH RBC QN AUTO: 32.8 PG (ref 26–34)
MCHC RBC AUTO-ENTMCNC: 35.6 G/DL (ref 32–36)
MCV RBC AUTO: 92 FL (ref 80–100)
MONOCYTES # BLD AUTO: 0.47 X10*3/UL (ref 0.1–1)
MONOCYTES NFR BLD AUTO: 5.6 %
NEUTROPHILS # BLD AUTO: 4.41 X10*3/UL (ref 1.2–7.7)
NEUTROPHILS NFR BLD AUTO: 52.9 %
NRBC BLD-RTO: 0 /100 WBCS (ref 0–0)
PLATELET # BLD AUTO: 398 X10*3/UL (ref 150–450)
RBC # BLD AUTO: 3.69 X10*6/UL (ref 4–5.2)
WBC # BLD AUTO: 8.4 X10*3/UL (ref 4.4–11.3)

## 2024-07-16 PROCEDURE — 99152 MOD SED SAME PHYS/QHP 5/>YRS: CPT

## 2024-07-16 PROCEDURE — 85097 BONE MARROW INTERPRETATION: CPT | Mod: TC,GEALAB | Performed by: PHYSICIAN ASSISTANT

## 2024-07-16 PROCEDURE — 7100000010 HC PHASE TWO TIME - EACH INCREMENTAL 1 MINUTE

## 2024-07-16 PROCEDURE — 7100000009 HC PHASE TWO TIME - INITIAL BASE CHARGE

## 2024-07-16 PROCEDURE — 99152 MOD SED SAME PHYS/QHP 5/>YRS: CPT | Performed by: RADIOLOGY

## 2024-07-16 PROCEDURE — 2500000004 HC RX 250 GENERAL PHARMACY W/ HCPCS (ALT 636 FOR OP/ED): Performed by: RADIOLOGY

## 2024-07-16 PROCEDURE — 85025 COMPLETE CBC W/AUTO DIFF WBC: CPT | Performed by: PHYSICIAN ASSISTANT

## 2024-07-16 PROCEDURE — 38222 DX BONE MARROW BX & ASPIR: CPT

## 2024-07-16 PROCEDURE — 2720000007 HC OR 272 NO HCPCS

## 2024-07-16 RX ORDER — MIDAZOLAM HYDROCHLORIDE 1 MG/ML
INJECTION, SOLUTION INTRAMUSCULAR; INTRAVENOUS
Status: DISCONTINUED
Start: 2024-07-16 | End: 2024-07-17 | Stop reason: HOSPADM

## 2024-07-16 RX ORDER — MIDAZOLAM HYDROCHLORIDE 1 MG/ML
INJECTION INTRAMUSCULAR; INTRAVENOUS
Status: COMPLETED | OUTPATIENT
Start: 2024-07-16 | End: 2024-07-16

## 2024-07-16 ASSESSMENT — PAIN SCALES - GENERAL
PAINLEVEL_OUTOF10: 0 - NO PAIN

## 2024-07-16 ASSESSMENT — PAIN - FUNCTIONAL ASSESSMENT
PAIN_FUNCTIONAL_ASSESSMENT: 0-10
PAIN_FUNCTIONAL_ASSESSMENT: 0-10

## 2024-07-16 NOTE — DISCHARGE INSTRUCTIONS
Okay to remove band-aid tomorrow  Okay to shower tomorrow  Okay to use ice as needed for pain  Okay to take tylenol as needed for pain  No driving for 24 hours  No drinking alcohol for 24 hours   No making any life changing decisions for 24 hours  Follow up with Hell[facility] in 2-4 weeks for results  Notify MD with any s/s of infection or active bleeding

## 2024-07-18 ENCOUNTER — APPOINTMENT (OUTPATIENT)
Dept: HEMATOLOGY/ONCOLOGY | Facility: CLINIC | Age: 54
End: 2024-07-18
Payer: COMMERCIAL

## 2024-07-18 ENCOUNTER — APPOINTMENT (OUTPATIENT)
Dept: RADIOLOGY | Facility: HOSPITAL | Age: 54
End: 2024-07-18
Payer: COMMERCIAL

## 2024-07-22 LAB
CELL COUNT (BLOOD): 17.15 X10*3/UL
CELL POPULATIONS: NORMAL
DIAGNOSIS: NORMAL
FLOW DIFFERENTIAL: NORMAL
FLOW TEST ORDERED: NORMAL
LAB TEST METHOD: NORMAL
NUMBER OF CELLS COLLECTED: NORMAL
PATH REPORT.COMMENTS IMP SPEC: NORMAL
PATH REPORT.FINAL DX SPEC: NORMAL
PATH REPORT.GROSS SPEC: NORMAL
PATH REPORT.MICROSCOPIC SPEC OTHER STN: NORMAL
PATH REPORT.RELEVANT HX SPEC: NORMAL
PATH REPORT.TOTAL CANCER: NORMAL
PATH REPORT.TOTAL CANCER: NORMAL
SIGNATURE COMMENT: NORMAL
SPECIMEN VIABILITY: NORMAL

## 2024-07-22 PROCEDURE — 88305 TISSUE EXAM BY PATHOLOGIST: CPT | Mod: TC,GEALAB | Performed by: PHYSICIAN ASSISTANT

## 2024-07-22 PROCEDURE — 88313 SPECIAL STAINS GROUP 2: CPT | Performed by: PATHOLOGY

## 2024-07-22 PROCEDURE — 88189 FLOWCYTOMETRY/READ 16 & >: CPT | Performed by: PHYSICIAN ASSISTANT

## 2024-07-22 PROCEDURE — 88305 TISSUE EXAM BY PATHOLOGIST: CPT | Performed by: PATHOLOGY

## 2024-07-22 PROCEDURE — 88341 IMHCHEM/IMCYTCHM EA ADD ANTB: CPT | Performed by: PATHOLOGY

## 2024-07-22 PROCEDURE — 88311 DECALCIFY TISSUE: CPT | Performed by: PATHOLOGY

## 2024-07-22 PROCEDURE — 88185 FLOWCYTOMETRY/TC ADD-ON: CPT | Mod: TC,GEALAB | Performed by: PHYSICIAN ASSISTANT

## 2024-07-22 PROCEDURE — 88342 IMHCHEM/IMCYTCHM 1ST ANTB: CPT | Performed by: PATHOLOGY

## 2024-08-07 ENCOUNTER — TELEMEDICINE (OUTPATIENT)
Dept: HEMATOLOGY/ONCOLOGY | Facility: CLINIC | Age: 54
End: 2024-08-07
Payer: COMMERCIAL

## 2024-08-07 DIAGNOSIS — D50.0 IRON DEFICIENCY ANEMIA DUE TO CHRONIC BLOOD LOSS: ICD-10-CM

## 2024-08-07 DIAGNOSIS — K90.9 IDIOPATHIC STEATORRHEA (HHS-HCC): ICD-10-CM

## 2024-08-07 DIAGNOSIS — K56.7 ILEUS (MULTI): ICD-10-CM

## 2024-08-07 PROCEDURE — 99214 OFFICE O/P EST MOD 30 MIN: CPT | Performed by: PHYSICIAN ASSISTANT

## 2024-08-07 NOTE — PROGRESS NOTES
"Visit Type: Benign Heme Follow-up, Virtual Visit:  A Virtual visit (telephone only) between the patient (at the originating site) and the provider (at the distant site) was utilized to provide this telehealth service.   Verbal Consent for Encounter: Verbal consent was requested and obtained from patient, or from parent/guardian if minor, on this date for a telehealth visit.     Reason for Visit  Paulie Byrd is a 54 y.o. female self referred for chronic normocytic anemia after admission for colonic ileus.    Upon review of labs, initially noted to have anemia since 4/2023, hemoglobin in the 9-10 ranges, with her episode of acute pancreatitis. Normal platelet and WBC count.    Admitted in 12/2023 for olonic ileus managed conservatively with NG tube, surgery not required. Showed acute kidney injury likely secondary to prerenal azotemia for which she was given IV fluids.     On assessment, noted to have 200 lbs weight loss after her father death a couple of years ago. Recently, she alternates between diarrhea and constipation. Patient has been diagnosed with pancreatic enzyme insufficiency and takes pancreatic enzymes when she remembers. She was diagnosed with gastroparesis by her former GI but was told the medication to treat this can cause neurological conditions and since her mother had MS she didn't want to take it. She has had EGDs with dilations for dysphagia and c-scopes for hemorroids and polyps. Recently, she feels fatigue and has \"zero appetite\". Denies f/c/night sweats, SOB, CP, n/v/d/abd pain, bleeding from any source, neuropathy, rash or any other complaint at this time.    PMH/PSH: Gastroparesis, chronic pancreatitis due to ETOH, gallstones s/p cholecystectomy, hysterectomy, Graves s/p ablation, now w/hypothyroidism, alopecia, b/l knee replacement.  FH: father w/presumed stomach cancer, MGM-colon cancer  Soc Hx: former smoker, ETOH and drug user, sober/clean x 15 years; MA at , , 2 " sons.    History of Present Illness:  Patient presents for follow up. On assessment, reports feeling continued fatigue despite sleeping she waked up more tired. Otherwise doing well.     Review of Systems: All of the systems have been reviewed and are negative for complaints except what is stated in the HPI and/or past medical history.    Allergies   Allergen Reactions    Codeine GI Upset, Other and Nausea/vomiting    Grass Pollen Unknown     Outpatient Medication Profile:  Current Outpatient Medications   Medication Sig Dispense Refill    benzoyl peroxide (Advanced Exfoliating Cleanser) 5 % external wash Apply to affected areas of skin as a wash, let set for 2 minutes, rinse thoroughly. Use daily in  g 2    clindamycin (Cleocin T) 1 % external solution Apply to affected areas twice daily. 60 mL 3    escitalopram (Lexapro) 20 mg tablet Take 1 tablet (20 mg) by mouth once daily. 90 tablet 1    gabapentin (Neurontin) 300 mg capsule TAKE 1 CAPSULE BY MOUTH EVERY 8 HOURS AND 1 ADDITIONAL CAPSULE AT BEDTIME 360 capsule 1    levothyroxine (Synthroid, Levoxyl) 75 mcg tablet Take 1 tablet (75 mcg) by mouth once daily. 90 tablet 1    omeprazole (PriLOSEC) 40 mg DR capsule Take 1 capsule (40 mg) by mouth once daily in the morning. Take before meals. Do not crush or chew. 90 capsule 3    ondansetron ODT (Zofran-ODT) 8 mg disintegrating tablet Take 1 tablet (8 mg) by mouth every 8 hours if needed for nausea or vomiting. 20 tablet 2    potassium chloride CR 20 mEq ER tablet Take 1 tablet (20 mEq) by mouth once daily. Do not crush or chew. 90 tablet 3    propranolol (Inderal) 20 mg tablet Take 1 tablet (20 mg) by mouth 3 times a day as needed (for anxiety). 60 tablet 3    spironolactone (Aldactone) 100 mg tablet Take one pill by mouth daily 90 tablet 3     No current facility-administered medications for this visit.      Vitals and Measurements:       7/16/2024    12:25 PM 7/16/2024    12:44 PM 7/16/2024    12:52 PM  "7/16/2024    12:57 PM 7/16/2024    12:59 PM 7/16/2024     1:09 PM 7/16/2024     1:22 PM   Vitals   Systolic 88 91 87 90 93 89 87   Diastolic 73 66 63 56 63 62 59   Heart Rate 64 60 64 65 63 61 62   Temp 36.2 °C (97.2 °F)     36.2 °C (97.2 °F)    Resp 18 15 18 15 18 18 18   Height (in) 1.626 m (5' 4\")         Weight (lb) 139         BMI 23.86 kg/m2         BSA (m2) 1.69 m2             Physical Exam:   Deferred due to telehealth.     Lab on 07/12/2024   Component Date Value Ref Range Status    WBC 07/12/2024 6.2  4.4 - 11.3 x10*3/uL Final    nRBC 07/12/2024 0.0  0.0 - 0.0 /100 WBCs Final    RBC 07/12/2024 3.72 (L)  4.00 - 5.20 x10*6/uL Final    Hemoglobin 07/12/2024 12.1  12.0 - 16.0 g/dL Final    Hematocrit 07/12/2024 35.3 (L)  36.0 - 46.0 % Final    MCV 07/12/2024 95  80 - 100 fL Final    MCH 07/12/2024 32.5  26.0 - 34.0 pg Final    MCHC 07/12/2024 34.3  32.0 - 36.0 g/dL Final    RDW 07/12/2024 11.9  11.5 - 14.5 % Final    Platelets 07/12/2024 430  150 - 450 x10*3/uL Final    Neutrophils % 07/12/2024 53.2  40.0 - 80.0 % Final    Immature Granulocytes %, Automated 07/12/2024 0.2  0.0 - 0.9 % Final    Immature Granulocyte Count (IG) includes promyelocytes, myelocytes and metamyelocytes but does not include bands. Percent differential counts (%) should be interpreted in the context of the absolute cell counts (cells/UL).    Lymphocytes % 07/12/2024 36.3  13.0 - 44.0 % Final    Monocytes % 07/12/2024 8.4  2.0 - 10.0 % Final    Eosinophils % 07/12/2024 1.4  0.0 - 6.0 % Final    Basophils % 07/12/2024 0.5  0.0 - 2.0 % Final    Neutrophils Absolute 07/12/2024 3.31  1.20 - 7.70 x10*3/uL Final    Percent differential counts (%) should be interpreted in the context of the absolute cell counts (cells/uL).    Immature Granulocytes Absolute, Au* 07/12/2024 0.01  0.00 - 0.70 x10*3/uL Final    Lymphocytes Absolute 07/12/2024 2.26  1.20 - 4.80 x10*3/uL Final    Monocytes Absolute 07/12/2024 0.52  0.10 - 1.00 x10*3/uL Final    " Eosinophils Absolute 07/12/2024 0.09  0.00 - 0.70 x10*3/uL Final    Basophils Absolute 07/12/2024 0.03  0.00 - 0.10 x10*3/uL Final    Iron 07/12/2024 76  35 - 150 ug/dL Final    UIBC 07/12/2024 290  110 - 370 ug/dL Final    TIBC 07/12/2024 366  240 - 445 ug/dL Final    % Saturation 07/12/2024 21 (L)  25 - 45 % Final    Glucose 07/12/2024 97  74 - 99 mg/dL Final    Sodium 07/12/2024 136  136 - 145 mmol/L Final    Potassium 07/12/2024 4.8  3.5 - 5.3 mmol/L Final    Chloride 07/12/2024 104  98 - 107 mmol/L Final    Bicarbonate 07/12/2024 23  21 - 32 mmol/L Final    Anion Gap 07/12/2024 14  10 - 20 mmol/L Final    Urea Nitrogen 07/12/2024 22  6 - 23 mg/dL Final    Creatinine 07/12/2024 1.61 (H)  0.50 - 1.05 mg/dL Final    eGFR 07/12/2024 38 (L)  >60 mL/min/1.73m*2 Final    Calculations of estimated GFR are performed using the 2021 CKD-EPI Study Refit equation without the race variable for the IDMS-Traceable creatinine methods.  https://jasn.asnjournals.org/content/early/2021/09/22/ASN.1514037522    Calcium 07/12/2024 10.9 (H)  8.6 - 10.6 mg/dL Final    Albumin 07/12/2024 4.8  3.4 - 5.0 g/dL Final    Alkaline Phosphatase 07/12/2024 44  33 - 110 U/L Final    Total Protein 07/12/2024 7.2  6.4 - 8.2 g/dL Final    AST 07/12/2024 19  9 - 39 U/L Final    Bilirubin, Total 07/12/2024 0.3  0.0 - 1.2 mg/dL Final    ALT 07/12/2024 16  7 - 45 U/L Final    Patients treated with Sulfasalazine may generate falsely decreased results for ALT.    Thyroid Stimulating Hormone 07/12/2024 1.04  0.44 - 3.98 mIU/L Final    Vitamin D, 25-Hydroxy, Total 07/12/2024 36  30 - 100 ng/mL Final   Office Visit on 06/27/2024   Component Date Value Ref Range Status    Ferritin 06/27/2024 44  8 - 150 ng/mL Final    Iron 06/27/2024 142  35 - 150 ug/dL Final    UIBC 06/27/2024 217  110 - 370 ug/dL Final    TIBC 06/27/2024 359  240 - 445 ug/dL Final    % Saturation 06/27/2024 40  25 - 45 % Final    WBC 06/27/2024 6.8  4.4 - 11.3 x10*3/uL Final    RBC  06/27/2024 3.37 (L)  4.00 - 5.20 x10*6/uL Final    Hemoglobin 06/27/2024 11.0 (L)  12.0 - 16.0 g/dL Final    Hematocrit 06/27/2024 32.2 (L)  36.0 - 46.0 % Final    MCV 06/27/2024 96  80 - 100 fL Final    MCH 06/27/2024 32.6  26.0 - 34.0 pg Final    MCHC 06/27/2024 34.2  32.0 - 36.0 g/dL Final    RDW 06/27/2024 12.9  11.5 - 14.5 % Final    Platelets 06/27/2024 358  150 - 450 x10*3/uL Final    Case Report 07/16/2024    Final                    Value:Surgical Pathology                                Case: [id number]                                  Authorizing Provider:  Hellen Wagner PA-C          Collected:           07/16/2024 1319              Ordering Location:     [facility]  Received:            07/16/2024 1319                                     Center                                                                       Pathologist:           Alvaro Petit MD                                                        Specimens:   A) - BONE MARROW CLOT, LEFT ILIAC CREST                                                             B) - BONE MARROW CORE BIOPSY, LEFT ILIAC CREST                                                      C) - BONE MARROW ASPIRATE, LEFT ILIAC CREST                                                FINAL DIAGNOSIS 07/16/2024    Final                    Value:A, B & C. BONE MARROW CLOT WITH ASPIRATE AND CORE WITH TOUCH PREP, LEFT ILIAC CREST:     -- LIMITED BONE MARROW SPECIMEN DEMONSTRATING NORMAL MATURING TRILINEAGE HEMATOPOIESIS, SEE NOTE.  --SMALL LYMPHOID AGGREGATE, FAVOR BENIGN    NOTE: The marrow is limited by an aspicular clot and a fragmented disrupted core with only a very small amount of evaluable marrow space. Hematopoietic elements appear normal on the aspirate smear which appears of good quality.  A small lymphoid aggregate was noted on the core biopsy that was comprised of a mixture of T and B cells. By flow cytometry neither a clonal B or T cell population was  detected. Therefore a benign aggregate is favored. No other specific abnormalities were identified by flow cytometry. Correlation with pending genetic studies recommended.       Bone Marrow Differential 07/16/2024    Final                    Value:Cell Type Value Reference Range   Promyelocytes 1.5 1-5 %   Myelocytes 16.5 5-10 %   Metamyelocytes 14.0 10-25 %   Bands 11.0 10-20 %   Segmented Neutrophils 19.5 5-30 %   Eosinophils 5.0 2-4 %   Basophils 0.0 0-1 %        Lymphocytes 3.5 5-25 %   Monocytes 3.5 0-2 %   Plasma Cells 0.5 0-2 %        Blasts 1.0 0-1 %        Total Erythroid 24.0 17-35 %        Total Cells Counted 200    Myeloid/Erythroid Ratio 2.8 1.5-4.1         Microscopic Description 07/16/2024    Final                    Value:PERIPHERAL SMEAR: Submitted              Red cells: Normal.        White cells: Normal.        Platelets: Normal, adequate.     ASPIRATE SMEAR: Submitted                      Specimen: Spicular.       Erythropoiesis: Normal.       Granulopoiesis: Normal.       Megakaryocytes: Present. Morphology: Normal.    TOUCH PREP: Submitted       Specimen: Cellular.        Comments: Similar to aspirate smear.    ASPIRATE CLOT: Submitted                           Specimen: Aspicular    CORE BIOPSY: Submitted                            Specimen: Limited, fragmented with significant disruption artifact and only small amount of intact marrow space present.        Cellularity: unable to assess       Estimated M:E ratio: unable to assess       Bony trabeculae: Normal.       Megakaryocytes: Present. Morphology: Normal.         Granulomas: Absent.       Lymphoid aggregates: Small aggregate of small lymphocytes present    SPECIAL STAINS:         Iron: Unable to be assessed due to lack of particles.                               IMMUNOHISTOCHEMISTRY: Performed.        CD3: Highlights majority of cells in a small lymphoid aggregate with scattered positive cells elsewhere.        CD20: Highlights some of  "cells in a small lymphoid aggregate with few scattered positive cells elsewhere.    FLOW CYTOMETRY: Performed, see separate report. No specific abnormality identified. No clonal B or T cell population detected.    The microscopic findings were reviewed in conjunction with hematopathology resident Saroj Tobin MD, PhD.      Gross Description 07/16/2024    Final                    Value:A: Received in formalin, labeled with the patient's name and hospital number and \" BM clot iliac\", is an irregular fragment of blood clot measuring 0.2 x 0.2 x 0.1 cm. The specimen is submitted in toto in one cassette.  The specimen may not survive processing.  DMB  B: Received in B-plus fixative, labeled with the patient's name and hospital number and \" BM core iliac\", is a cylindrical segment of bone measuring 2.2 x 0.3 x 0.3 cm. The specimen is submitted in toto in one cassette following decalcification in [facility].   DMB      Case Report 07/16/2024    Final                    Value:Flow Cytometry                                    Case: [id number]                                   Authorizing Provider:  Hellen Wagner PA-C          Collected:           07/16/2024 1319              Ordering Location:     [facility]  Received:            07/16/2024 1319                                     Center                                                                       Pathologist:           Alvaro Petit MD                                                        Specimen:    Bone Marrow Aspirate                                                                       Diagnosis 07/16/2024    Final                    Value:--No immunophenotypic evidence of a lymphoproliferative disorder.   --No increased or abnormal blast population.   --No abnormality of granulocytes or monocytes, see note.    Note: Clinical and morphologic correlation is suggested.         07/16/2024    Final                    Value:By the signature " on this report, the individual or group listed as making the Final Interpretation/Diagnosis certifies that they have reviewed this case and the staining reactivity of the antibodies and reagents in the analysis were determined to be acceptable. Diagnostic interpretation performed at [facility]      Flow Differential 07/16/2024    Final                    Value:Lymphocyte: 21 %       CD3+CD4+: 56 % ;  Polyclonal          CD3+CD8+: 19 % ;  Polyclonal          Natural Killer Cells: 4 %         CD19+: 19 %         B Cell Light Chain Expression: Polyclonal           Surface Kappa/Surface Lambda: 58:36         Monocyte: 1 %     No immunophenotypic abnormality was detected.      Granulocyte: 68 %     No immunophenotypic abnormality was detected.      Blast: 0.30 %     No immunophenotypic abnormality was detected.            Flow Test Ordered 07/16/2024 Acute Panel  not established Final    Specimen Viability 07/16/2024 Acceptable  not established Final    Cell Count 07/16/2024 17.15  not established x10*3/uL Final    Number of Cells Collected 07/16/2024    Final    Comment: 100,000 - 250,000    Methodology 07/16/2024    Final                    Value:Reference ranges not established.    This test is a multicolor, whole blood lysis assay. It was developed and its performance characteristics determined by the Department of Pathology, [facility], and has not been cleared or approved by the U.S. Food and Drug Administration. The laboratory is regulated under CLIA as qualified to perform high complexity testing. This test is used for clinical purposes. It should not be regarded as investigational or for research.    Immunophenotypic analysis was performed using the following antibodies: 1A: CD45. 1B: CD71, CD1c, , CD34, CD14, CD45. 1C: CD20, CD19, CD10, CD34, CD38, CD45. 1D: CD15, , CD33, CD34, HLA-DR, CD45. 1E: CD8, CD7, CD4, CD34, CD3, CD45. 1F: CD56, CD13, CD5, CD34, CD2, CD45. 1G:  , CD13, , CD11b, CD16, CD45. 1H: Kappa Surface, Lambda Surface, CD9, CD22, CD19, CD45. 1I: CD71, Glycophorin-A, CD9, , CD36, CD45. 1J: , CD34, CD38, CD71, CD19, CD45. 1K: TRBC1, TCR Gamma/Delta, CD4,                           CD8, CD3, CD45.          WBC 07/16/2024 8.4  4.4 - 11.3 x10*3/uL Final    nRBC 07/16/2024 0.0  0.0 - 0.0 /100 WBCs Final    RBC 07/16/2024 3.69 (L)  4.00 - 5.20 x10*6/uL Final    Hemoglobin 07/16/2024 12.1  12.0 - 16.0 g/dL Final    Hematocrit 07/16/2024 34.0 (L)  36.0 - 46.0 % Final    MCV 07/16/2024 92  80 - 100 fL Final    MCH 07/16/2024 32.8  26.0 - 34.0 pg Final    MCHC 07/16/2024 35.6  32.0 - 36.0 g/dL Final    RDW 07/16/2024 11.7  11.5 - 14.5 % Final    Platelets 07/16/2024 398  150 - 450 x10*3/uL Final    Neutrophils % 07/16/2024 52.9  40.0 - 80.0 % Final    Immature Granulocytes %, Automated 07/16/2024 0.2  0.0 - 0.9 % Final    Immature Granulocyte Count (IG) includes promyelocytes, myelocytes and metamyelocytes but does not include bands. Percent differential counts (%) should be interpreted in the context of the absolute cell counts (cells/UL).    Lymphocytes % 07/16/2024 38.9  13.0 - 44.0 % Final    Monocytes % 07/16/2024 5.6  2.0 - 10.0 % Final    Eosinophils % 07/16/2024 1.9  0.0 - 6.0 % Final    Basophils % 07/16/2024 0.5  0.0 - 2.0 % Final    Neutrophils Absolute 07/16/2024 4.41  1.20 - 7.70 x10*3/uL Final    Percent differential counts (%) should be interpreted in the context of the absolute cell counts (cells/uL).    Immature Granulocytes Absolute, Au* 07/16/2024 0.02  0.00 - 0.70 x10*3/uL Final    Lymphocytes Absolute 07/16/2024 3.25  1.20 - 4.80 x10*3/uL Final    Monocytes Absolute 07/16/2024 0.47  0.10 - 1.00 x10*3/uL Final    Eosinophils Absolute 07/16/2024 0.16  0.00 - 0.70 x10*3/uL Final    Basophils Absolute 07/16/2024 0.04  0.00 - 0.10 x10*3/uL Final   Lab on 06/07/2024   Component Date Value Ref Range Status    WBC 06/07/2024 7.8  4.4 - 11.3  x10*3/uL Final    nRBC 06/07/2024 0.0  0.0 - 0.0 /100 WBCs Final    RBC 06/07/2024 3.36 (L)  4.00 - 5.20 x10*6/uL Final    Hemoglobin 06/07/2024 10.8 (L)  12.0 - 16.0 g/dL Final    Hematocrit 06/07/2024 33.0 (L)  36.0 - 46.0 % Final    MCV 06/07/2024 98  80 - 100 fL Final    MCH 06/07/2024 32.1  26.0 - 34.0 pg Final    MCHC 06/07/2024 32.7  32.0 - 36.0 g/dL Final    RDW 06/07/2024 13.7  11.5 - 14.5 % Final    Platelets 06/07/2024 458 (H)  150 - 450 x10*3/uL Final    Neutrophils % 06/07/2024 53.6  40.0 - 80.0 % Final    Immature Granulocytes %, Automated 06/07/2024 0.4  0.0 - 0.9 % Final    Immature Granulocyte Count (IG) includes promyelocytes, myelocytes and metamyelocytes but does not include bands. Percent differential counts (%) should be interpreted in the context of the absolute cell counts (cells/UL).    Lymphocytes % 06/07/2024 37.4  13.0 - 44.0 % Final    Monocytes % 06/07/2024 6.6  2.0 - 10.0 % Final    Eosinophils % 06/07/2024 1.4  0.0 - 6.0 % Final    Basophils % 06/07/2024 0.6  0.0 - 2.0 % Final    Neutrophils Absolute 06/07/2024 4.15  1.20 - 7.70 x10*3/uL Final    Percent differential counts (%) should be interpreted in the context of the absolute cell counts (cells/uL).    Immature Granulocytes Absolute, Au* 06/07/2024 0.03  0.00 - 0.70 x10*3/uL Final    Lymphocytes Absolute 06/07/2024 2.90  1.20 - 4.80 x10*3/uL Final    Monocytes Absolute 06/07/2024 0.51  0.10 - 1.00 x10*3/uL Final    Eosinophils Absolute 06/07/2024 0.11  0.00 - 0.70 x10*3/uL Final    Basophils Absolute 06/07/2024 0.05  0.00 - 0.10 x10*3/uL Final    Myeloid Malignancies Results 06/07/2024    Final                    Value:                          DISEASE ASSOCIATED GENOMIC FINDINGS:                           DNMT3A p.K629Hhb*6 ([id number])                                                    INTERPRETATION:                          DNMT3A p.Q812Hlt*6                          VAF: 22%                          Mutations in  DNMT3A are frequently seen in CHIP and CCUS (NCCN 1.2021).                           DNMT3A mutations may indicate clonal hematopoiesis when myelodysplastic                           syndrome (MDS) is suspected, but they should not be used as presumptive                           evidence of MDS when other diagnostic criteria for MDS have not been met                           (NCCN 1.2021). DNMT3A mutations may be associated with a decreased overall                           survival in MDS (NCCN 1.2021).                                                    Amplicons with coverage <300x (Gene:Exon): (ASXL1:12), (DNMT3A:23),                           (DNMT3A:8), (ETV6:7), (RUNX1:4), (SRSF2:1), (ZRSR2:10).                          A false negative result cannot be excluded in these regions, especially in                           samples of low neoplastic cell content.                                                    DISCLAIMER:                           This assay is designed to detect targeted clinically-relevant single                           nucleotide variants and insertions and deletions (<30bp) in a select group                           of genes. This assay has also been designed to detect specific larger                           insertions and deletions: FLT3 internal tandem duplication (ITD) and CALR                           Type I mutations. This assay does not distinguish between somatic and                           germline alterations in analyzed regions. A negative result (mutation not                           identified) does not rule out the presence of a mutation below the limit                           of detection of this assay due to low neoplastic cell content, tumor                           heterogeneity, or the presence of additional mutations in the listed genes                           which are outside of the target regions in this assay. Mutations in some                            homopolymeric regions (eg. ASXL1 p.Q705Drp*12) are not consistently                           detected by this technology. General population polymorphisms, promoter,                           synonymous and intronic variants (with the exception of splice variants)                           are not generally included in this report.                                                    Identification or absence of cancer-associated mutations does not                           necessarily indicate a response to therapy. Decisions on patient care and                           treatment must be based on the independent medical judgment of the                           treating physician, taking into account all applicable information                           concerning the patient's condition such as clinical and histopathologic                           findings, other laboratory findings, and patient preferences. This report                           includes information from public sources, including scientific and medical                           literature to better characterize the significance of alterations                           detected.                                                    This laboratory developed test was developed and its analytical                           performance characteristics have been determined by [facility]                           [facility]. This test has not been cleared or approved by the FDA;                           however, the FDA has determined that such approval is not necessary. The                           [facility] is certified under the Clinical Laboratory Improvement Amendments of                           1988 (CLIA-88) as qualified to perform high complexity testing.                                                    PANEL GENE LIST:                          ASXL1(11-12), BRAF(15), CALR(9), CBL(7-9), CSF3R(14,17),                            DNMT3A(8-12,18-19,22-23), ETV6(2-8), EZH2(15-19), FLT3(14-16,20),                           GATA2(4-6), IDH1(4), IDH2(4), JAK2(12,14), JAK3(4,13,16), KIT(17),                           KRAS(2-4), MPL(4,10), MYD88(5), NPM1(11), NRAS(2-3), PHF6(2-10),                           PTPN11(3,13), RUNX1(4-9), SETBP1(4), SF3B1(14-16), SRSF2(1), TET2(3-11),                           TP53(2-11), U2AF1(2,6), WT1(7,9), ZRSR2(1-10).                                                    Not all exons are sequenced in their entirety. Exons covered are shown in                           parenthesis. Genome assembly (hg19) was used for alignment and variant                           calling.                              Electronically signed and reported* 06/07/2024    Final                    Value:Yakelin Del Toro MD PhD     Vitamin B12 06/07/2024 1,224 (H)  211 - 911 pg/mL Final    Iron 06/07/2024 197 (H)  35 - 150 ug/dL Final    UIBC 06/07/2024 173  110 - 370 ug/dL Final    TIBC 06/07/2024 370  240 - 445 ug/dL Final    % Saturation 06/07/2024 53 (H)  25 - 45 % Final    Ferritin 06/07/2024 38  8 - 150 ng/mL Final    Ig Kappa Free Light Chain 06/07/2024 3.34 (H)  0.33 - 1.94 mg/dL Final    Ig Lambda Free Light Chain 06/07/2024 2.45  0.57 - 2.63 mg/dL Final    Kappa/Lambda Ratio 06/07/2024 1.36  0.26 - 1.65 Final    IgG 06/07/2024 623 (L)  700 - 1,600 mg/dL Final    IgA 06/07/2024 161  70 - 400 mg/dL Final    IgM 06/07/2024 65  40 - 230 mg/dL Final    Total Protein 06/07/2024 6.5  6.4 - 8.2 g/dL Final    Albumin 06/07/2024 4.2  3.4 - 5.0 g/dL Final    Alpha 1 Globulin 06/07/2024 0.3  0.2 - 0.6 g/dL Final    Alpha 2 Globulin 06/07/2024 0.8  0.4 - 1.1 g/dL Final    Beta Globulin 06/07/2024 0.7  0.5 - 1.2 g/dL Final    Gamma 06/07/2024 0.5  0.5 - 1.4 g/dL Final    Protein Electrophoresis Comment 06/07/2024 Normal.   Final    Immunofixation Comment 06/07/2024 No monoclonal protein detected by immunofixation.   Final    Path Review - Serum  Protein Electr* 06/07/2024 Reviewed and approved by ODALIS THOMAS on 6/11/24 at 7:54 PM.       Final    Path Review - Serum Immunofixation 06/07/2024 Reviewed and approved by ODALIS THOMAS on 6/11/24 at 7:54 PM.       Final   Office Visit on 06/07/2024   Component Date Value Ref Range Status    POC Color, Urine 06/07/2024 Yellow  Straw, Yellow, Light-Yellow Final    POC Appearance, Urine 06/07/2024 Clear  Clear Final    POC Glucose, Urine 06/07/2024 NEGATIVE  NEGATIVE mg/dl Final    POC Bilirubin, Urine 06/07/2024 NEGATIVE  NEGATIVE Final    POC Ketones, Urine 06/07/2024 NEGATIVE  NEGATIVE mg/dl Final    POC Specific Gravity, Urine 06/07/2024 >=1.030  1.005 - 1.035 Final    POC Blood, Urine 06/07/2024 NEGATIVE  NEGATIVE Final    POC PH, Urine 06/07/2024 5.5  No Reference Range Established PH Final    POC Protein, Urine 06/07/2024 NEGATIVE  NEGATIVE, 30 (1+) mg/dl Final    POC Urobilinogen, Urine 06/07/2024 0.2  0.2, 1.0 EU/DL Final    Poc Nitrite, Urine 06/07/2024 NEGATIVE  NEGATIVE Final    POC Leukocytes, Urine 06/07/2024 TRACE (A)  NEGATIVE Final    Urine Culture 06/07/2024 No significant growth   Final    Color, Urine 06/07/2024 Yellow  Light-Yellow, Yellow, Dark-Yellow Final    Appearance, Urine 06/07/2024 Clear  Clear Final    Specific Gravity, Urine 06/07/2024 1.017  1.005 - 1.035 Final    pH, Urine 06/07/2024 5.0  5.0, 5.5, 6.0, 6.5, 7.0, 7.5, 8.0 Final    Protein, Urine 06/07/2024 NEGATIVE  NEGATIVE, 10 (TRACE), 20 (TRACE) mg/dL Final    Glucose, Urine 06/07/2024 Normal  Normal mg/dL Final    Blood, Urine 06/07/2024 NEGATIVE  NEGATIVE Final    Ketones, Urine 06/07/2024 NEGATIVE  NEGATIVE mg/dL Final    Bilirubin, Urine 06/07/2024 NEGATIVE  NEGATIVE Final    Urobilinogen, Urine 06/07/2024 Normal  Normal mg/dL Final    Nitrite, Urine 06/07/2024 NEGATIVE  NEGATIVE Final    Leukocyte Esterase, Urine 06/07/2024 250 Shantanu/µL (A)  NEGATIVE Final    WBC, Urine 06/07/2024 1-5  1-5, NONE /HPF Final    RBC, Urine  06/07/2024 1-2  NONE, 1-2, 3-5 /HPF Final    Squamous Epithelial Cells, Urine 06/07/2024 1-9 (SPARSE)  Reference range not established. /HPF Final    Mucus, Urine 06/07/2024 FEW  Reference range not established. /LPF Final   Lab on 04/30/2024   Component Date Value Ref Range Status    Thyroid Stimulating Hormone 04/30/2024 0.47  0.44 - 3.98 mIU/L Final   Lab Requisition on 03/05/2024   Component Date Value Ref Range Status    Case Report 03/05/2024    Final                    Value:Surgical Pathology                                Case: [id number]                                  Authorizing Provider:  Kyree Storm MD       Collected:           03/05/2024 0824              Ordering Location:     [facility]       Received:            03/07/2024 1125                                     Center                                                                       Pathologist:           Elroy Soto MD                                                          Specimens:   A) - DUODENUM SECOND PART BIOPSY, SECOND PART DUODENUM, COLD FORCEP, BIOPSY                         B) - STOMACH ANTRUM BIOPSY, BODY, ANTRUM, COLD FORCEP, BIOPSY                                       C) - COLON  - RANDOM BIOPSY, RANDOM BIOPSIES ENTIRE COLON, COLD FORCEP, BIOPSY             FINAL DIAGNOSIS 03/05/2024    Final                    Value:A. DUODENUM SECOND PART BIOPSY: SMALL INTESTINAL MUCOSA WITH NO                           SIGNIFICANT PATHOLOGIC FINDINGS.                                                    B. STOMACH ANTRUM BIOPSY: REACTIVE GASTROPATHY, SEE NOTE.                                                    Note: No microorganisms are identified.                                                    C. COLON  - RANDOM BIOPSY: COLONIC MUCOSA WITH NO SIGNIFICANT PATHOLOGIC                           FINDINGS.          03/05/2024    Final                    Value:By the signature on this report, the individual or group  "listed as making                           the Final Interpretation/Diagnosis certifies that they have reviewed this                           case.     Clinical History 03/05/2024    Final                    Value:Epigastric abdominal pain, heartburn, diarrhea, weight loss, iron                           deficiency anemia                          A) celiac sprue                          B) gastritis, Helicobacter pylori                          C) rule out microscopic colitis    Gross Description 03/05/2024    Final                    Value:A: Received in formalin, labeled with the patient's name and hospital                           number and \"duodenum-second part, cold forcep\", are multiple fragments of                           tan, soft tissue aggregating to 0.8 x 0.3 x 0.2 cm. The specimen is                           submitted in toto in one cassette.                          MKM                          B: Received in formalin, labeled with the patient's name and hospital                           number and \"stomach-body, antrum, cold forcep\", are multiple fragments of                           tan, soft tissue aggregating to 0.9 x 0.3 x 0.2 cm. The specimen is                           submitted in toto in one cassette.                          MKM                          C: Received in formalin, labeled with the patient's name and hospital                           number and \"colon-random biopsies entire colon, cold forcep\", are multiple                           fragments of tan, soft tissue aggregating to 1.7 x 0.3 x 0.2 cm. The                           specimen is submitted in toto in one cassette.                          MK   Lab on 02/09/2024   Component Date Value Ref Range Status    Iron 02/09/2024 136  35 - 150 ug/dL Final    UIBC 02/09/2024 232  110 - 370 ug/dL Final    TIBC 02/09/2024 368  240 - 445 ug/dL Final    % Saturation 02/09/2024 37  25 - 45 % Final    Ferritin 02/09/2024 125  8 - " 150 ng/mL Final    WBC 02/09/2024 11.0  4.4 - 11.3 x10*3/uL Final    nRBC 02/09/2024 0.0  0.0 - 0.0 /100 WBCs Final    RBC 02/09/2024 3.82 (L)  4.00 - 5.20 x10*6/uL Final    Hemoglobin 02/09/2024 11.9 (L)  12.0 - 16.0 g/dL Final    Hematocrit 02/09/2024 36.7  36.0 - 46.0 % Final    MCV 02/09/2024 96  80 - 100 fL Final    MCH 02/09/2024 31.2  26.0 - 34.0 pg Final    MCHC 02/09/2024 32.4  32.0 - 36.0 g/dL Final    RDW 02/09/2024 12.4  11.5 - 14.5 % Final    Platelets 02/09/2024 610 (H)  150 - 450 x10*3/uL Final    delta reviewed    Glucose 02/09/2024 97  74 - 99 mg/dL Final    Sodium 02/09/2024 137  136 - 145 mmol/L Final    Potassium 02/09/2024 4.4  3.5 - 5.3 mmol/L Final    Chloride 02/09/2024 100  98 - 107 mmol/L Final    Bicarbonate 02/09/2024 26  21 - 32 mmol/L Final    Anion Gap 02/09/2024 15  10 - 20 mmol/L Final    Urea Nitrogen 02/09/2024 13  6 - 23 mg/dL Final    Creatinine 02/09/2024 0.99  0.50 - 1.05 mg/dL Final    eGFR 02/09/2024 68  >60 mL/min/1.73m*2 Final    Calculations of estimated GFR are performed using the 2021 CKD-EPI Study Refit equation without the race variable for the IDMS-Traceable creatinine methods.  https://jasn.asnjournals.org/content/early/2021/09/22/ASN.3596859260    Calcium 02/09/2024 9.8  8.6 - 10.3 mg/dL Final    Thyroid Stimulating Hormone 02/09/2024 0.16 (L)  0.44 - 3.98 mIU/L Final    Thyroxine, Free 02/09/2024 1.25 (H)  0.61 - 1.12 ng/dL Final    Triiodothyronine, Free 02/09/2024 3.4  2.3 - 4.2 pg/mL Final     Assessment:    53 y.o. female self referred for chronic normocytic anemia after admission for colonic ileus.    Chronic pancreatitis due to ETOH    EPI on PERT and gastroparesis     Plan:    Reviewed and discussed lab, imaging, and pathology results with patient in detail as well as diagnosis, prognosis, and treatment options.    7/2024: BMBx:  LIMITED BONE MARROW SPECIMEN DEMONSTRATING NORMAL MATURING TRILINEAGE HEMATOPOIESIS, SEE NOTE. SMALL LYMPHOID AGGREGATE, FAVOR  BENIGN  The marrow is limited by an aspicular clot and a fragmented disrupted core with only a very small amount of evaluable marrow space. Hematopoietic elements appear normal on the aspirate smear which appears of good quality.  A small lymphoid aggregate was noted on the core biopsy that was comprised of a mixture of T and B cells. By flow cytometry neither a clonal B or T cell population was detected. Therefore a benign aggregate is favored. No other specific abnormalities were identified by flow cytometry.    Discussed marrow essentially normal and level fatigue isnt explained by hgb so recommend further work up with sleep study as well as seeing cardiology (having hypotension at times, on Propranolol for anxiety PRN)    F/UP w/PCP    RTC in 6 months     Patient verbalized understanding, and all her questions were answered to her satisfaction.     30 min spent with patient greater than 50 % of which was spent in consultation, counselling and coordination of care telehealth.

## 2024-08-13 ENCOUNTER — APPOINTMENT (OUTPATIENT)
Dept: DERMATOLOGY | Facility: CLINIC | Age: 54
End: 2024-08-13
Payer: COMMERCIAL

## 2024-08-13 DIAGNOSIS — D18.01 ANGIOMA OF SKIN: Primary | ICD-10-CM

## 2024-08-13 DIAGNOSIS — L64.9 ANDROGENETIC ALOPECIA: ICD-10-CM

## 2024-08-13 DIAGNOSIS — L70.0 SUPERFICIAL ACNE VULGARIS: ICD-10-CM

## 2024-08-13 PROCEDURE — 99214 OFFICE O/P EST MOD 30 MIN: CPT | Performed by: NURSE PRACTITIONER

## 2024-08-13 PROCEDURE — 1036F TOBACCO NON-USER: CPT | Performed by: NURSE PRACTITIONER

## 2024-08-13 NOTE — PATIENT INSTRUCTIONS

## 2024-08-13 NOTE — PROGRESS NOTES
Subjective     Paulie Byrd is a 54 y.o. female who presents for the following: Androgenetic alopecia and Suspicious Skin Lesion (Left breast, forehead. ).     Review of Systems:  No other skin or systemic complaints other than what is documented elsewhere in the note.    The following portions of the chart were reviewed this encounter and updated as appropriate:   Tobacco  Allergies  Meds  Problems  Med Hx  Surg Hx         Skin Cancer History  No skin cancer on file.      Specialty Problems          Dermatology Problems    Rash and other nonspecific skin eruption    Hair loss    Hemangioma of skin and subcutaneous tissue    Melanocytic nevi of trunk    Melanocytic nevi of unspecified lower limb, including hip    Melanocytic nevi of unspecified upper limb, including shoulder    Other hypertrophic disorders of the skin    Other melanin hyperpigmentation    Other seborrheic keratosis    Scar condition and fibrosis of skin    Telogen effluvium    Atypical mole    Alopecia        Objective   Well appearing patient in no apparent distress; mood and affect are within normal limits.    A focused skin examination was performed. All findings within normal limits unless otherwise noted below.    Assessment/Plan   1. Angioma of skin  Left Breast  Scattered cherry-red papule(s).    A cherry hemangioma is a small macule (small, flat, smooth area) or papule (small, solid bump) formed from an overgrowth of tiny blood vessels in the skin. Cherry hemangiomas are characteristically red or purplish in color. They often first appear in middle adulthood and usually increase in number with age. Cherry hemangiomas are noncancerous (benign) and are common in adults.    The present appearance of the lesion is not worrisome but it should continue to be observed and testing/treatment may be warranted if change occurs.    2. Androgenetic alopecia  Scalp  Mild thinning of the hair. Some miniaturization of the hair follicles noted on  the crown    This is a 53 y.o. female  following up for AGA.  Patient noted increased dizziness and lightheadedness ~4 weeks ago. Spironolactone was stopped 4 weeks ago and patient continues to have low blood pressure. BP 80/62 on exam today. Hair is stable overall at this time.  Patient to follow up with cardiology for further evaluation.          Related Procedures  Follow Up In Dermatology - Established Patient    Related Medications  spironolactone (Aldactone) 100 mg tablet  Take one pill by mouth daily    3. Superficial acne vulgaris  Head - Anterior (Face)  No erythematous papulopustules. 1 excoriated pink papule on the forehead.     Reassured patient regarding lesion on the forehead. Patient picked at the lesion about 1 week ago and it has been slowly healing. Slight lichenification noted, so there could be some chronic manipulation but advised patient to avoid picking at this time.     PLAN    AM/PM: Wash face with benzoyl peroxide wash rinse after 2 minutes. Then apply clindamycin lotion to affected areas    Related Procedures  Follow Up In Dermatology - Established Patient    Related Medications  clindamycin (Cleocin T) 1 % external solution  Apply to affected areas twice daily.    benzoyl peroxide (Advanced Exfoliating Cleanser) 5 % external wash  Apply to affected areas of skin as a wash, let set for 2 minutes, rinse thoroughly. Use daily in AM        Return in November 2024 for routine skin check or return to clinic sooner if needed

## 2024-08-27 LAB
BAND RESOLUTION: 400 BANDS
CHROM ANALY OVERALL INTERP-IMP: NORMAL
CHROMOSOME ANALYSIS CELLS ANALYZED: 20 CELLS
CHROMOSOME ANALYSIS CELLS IMAGED: 4 CELLS
CHROMOSOME ANALYSIS HYPERMODAL CELL COUNT: 0 CELLS
CHROMOSOME ANALYSIS HYPOMODAL CELL COUNT: 0 CELLS
CHROMOSOME ANALYSIS MODAL CHROMOSOME NO: 46 CHROMOSOMES
CHROMOSOME ANALYSIS STAINING METHOD: NORMAL
ELECTRONICALLY SIGNED BY CYTOGENETICS: NORMAL
KARYOTYP MAR: 2 CELLS
TOTAL CELLS COUNTED MAR: 20 CELLS

## 2024-09-03 ENCOUNTER — OFFICE VISIT (OUTPATIENT)
Dept: CARDIOLOGY | Facility: CLINIC | Age: 54
End: 2024-09-03
Payer: COMMERCIAL

## 2024-09-03 VITALS
HEART RATE: 84 BPM | OXYGEN SATURATION: 98 % | DIASTOLIC BLOOD PRESSURE: 64 MMHG | BODY MASS INDEX: 22.55 KG/M2 | WEIGHT: 131.4 LBS | SYSTOLIC BLOOD PRESSURE: 93 MMHG

## 2024-09-03 DIAGNOSIS — D50.0 IRON DEFICIENCY ANEMIA DUE TO CHRONIC BLOOD LOSS: ICD-10-CM

## 2024-09-03 DIAGNOSIS — R00.2 HEART PALPITATIONS: ICD-10-CM

## 2024-09-03 DIAGNOSIS — I95.0 IDIOPATHIC HYPOTENSION: ICD-10-CM

## 2024-09-03 DIAGNOSIS — K90.9 IDIOPATHIC STEATORRHEA (HHS-HCC): ICD-10-CM

## 2024-09-03 DIAGNOSIS — R06.02 SHORTNESS OF BREATH: Primary | ICD-10-CM

## 2024-09-03 DIAGNOSIS — K56.7 ILEUS (MULTI): ICD-10-CM

## 2024-09-03 PROCEDURE — 1036F TOBACCO NON-USER: CPT | Performed by: NURSE PRACTITIONER

## 2024-09-03 PROCEDURE — 99204 OFFICE O/P NEW MOD 45 MIN: CPT | Performed by: NURSE PRACTITIONER

## 2024-09-03 PROCEDURE — 99214 OFFICE O/P EST MOD 30 MIN: CPT | Performed by: NURSE PRACTITIONER

## 2024-09-03 RX ORDER — MIDODRINE HYDROCHLORIDE 2.5 MG/1
2.5 TABLET ORAL
Qty: 90 TABLET | Refills: 3 | Status: SHIPPED | OUTPATIENT
Start: 2024-09-03 | End: 2025-09-03

## 2024-09-03 ASSESSMENT — ENCOUNTER SYMPTOMS
GASTROINTESTINAL NEGATIVE: 1
RESPIRATORY NEGATIVE: 1
DYSPNEA ON EXERTION: 1
HEMATOLOGIC/LYMPHATIC NEGATIVE: 1
ENDOCRINE NEGATIVE: 1
PSYCHIATRIC NEGATIVE: 1
EYES NEGATIVE: 1
NEUROLOGICAL NEGATIVE: 1

## 2024-09-03 NOTE — PROGRESS NOTES
Referred by Dr. Wagner for Fatigue and Hypotension     History Of Present Illness:    Dear Samreen Morales CNP,     I had the pleasure of meeting Mrs. Byrd today at [facility] for evaluation of fatigue. The patient is seen in collaboration with Dr. Ford. Mrs. Byrd is a very pleasant 54 year old female with a history of depression, anxiety, RLS, gastritis and hypothyroidism, former history of smoking. Blood pressure has been low at home. Not feeling well overall. Complains of general fatigue and shortness of breath with any exertion. Complains of a tremor. Has had episodes of near syncope. She stopped Spironolactone due to low blood pressure.     Review of Systems   Constitutional: Positive for malaise/fatigue.   HENT: Negative.     Eyes: Negative.    Cardiovascular:  Positive for dyspnea on exertion.   Respiratory: Negative.     Endocrine: Negative.    Hematologic/Lymphatic: Negative.    Skin: Negative.    Musculoskeletal:  Positive for muscle weakness.   Gastrointestinal: Negative.    Neurological: Negative.    Psychiatric/Behavioral: Negative.        Past Medical History:  She has a past medical history of Arthritis, Bilateral primary osteoarthritis of knee (01/19/2016), Chronic pain syndrome (06/06/2018), Chronic pancreatitis (Multi), Contusion of lower back and pelvis, subsequent encounter (09/07/2016), Diabetes mellitus (Multi), Encounter for other preprocedural examination (12/02/2016), Encounter for other preprocedural examination (01/19/2016), Graves disease, Other abnormality of red blood cells (06/23/2015), Other tear of medial meniscus, current injury, right knee, subsequent encounter (01/19/2016), Pain in right knee (01/19/2016), Personal history of other diseases of the musculoskeletal system and connective tissue (11/11/2015), Personal history of other diseases of the musculoskeletal system and connective tissue (12/29/2014), Personal history of other endocrine,  nutritional and metabolic disease (06/06/2018), Personal history of other specified conditions (12/11/2014), Personal history of other specified conditions (04/22/2015), Unilateral primary osteoarthritis, left knee (12/02/2016), and Unilateral primary osteoarthritis, right knee (03/04/2016).    She has no past medical history of Adverse effect of anesthesia, Asthma (HHS-HCC), Awareness under anesthesia, Cancer (Multi), CHF (congestive heart failure) (Multi), COPD (chronic obstructive pulmonary disease) (Multi), Coronary artery disease, Delayed emergence from general anesthesia, Disease of thyroid gland, Hard to intubate, History of transfusion, Hypertension, Malignant hyperthermia, PONV (postoperative nausea and vomiting), Pseudocholinesterase deficiency, Spinal headache, or Stroke (Multi).    Past Surgical History:  She has a past surgical history that includes Other surgical history (06/27/2014); Cholecystectomy (06/27/2014); Hysterectomy (06/27/2014); Adenoidectomy (06/27/2014); Other surgical history (09/23/2021); Other surgical history (09/23/2021); Joint replacement; Abdominal surgery; and Skin biopsy.      Social History:  She reports that she has quit smoking. She has never used smokeless tobacco. She reports that she does not currently use alcohol. She reports that she does not currently use drugs.    Family History:  Family History   Problem Relation Name Age of Onset    Allergies Other          Allergies:  Codeine and Grass pollen    Outpatient Medications:  Current Outpatient Medications   Medication Instructions    benzoyl peroxide (Advanced Exfoliating Cleanser) 5 % external wash Apply to affected areas of skin as a wash, let set for 2 minutes, rinse thoroughly. Use daily in AM    clindamycin (Cleocin T) 1 % external solution Apply to affected areas twice daily.    escitalopram (LEXAPRO) 20 mg, oral, Daily    gabapentin (Neurontin) 300 mg capsule TAKE 1 CAPSULE BY MOUTH EVERY 8 HOURS AND 1 ADDITIONAL  CAPSULE AT BEDTIME    iron,carb/vit C/vit B12/folic (IRON 100 PLUS ORAL) oral    levothyroxine (SYNTHROID, LEVOXYL) 75 mcg, oral, Daily    omeprazole (PRILOSEC) 40 mg, oral, Daily before breakfast, Do not crush or chew.    ondansetron ODT (ZOFRAN-ODT) 8 mg, oral, Every 8 hours PRN    potassium chloride CR 20 mEq ER tablet 20 mEq, oral, Daily, Do not crush or chew.    propranolol (INDERAL) 20 mg, oral, 3 times daily PRN    spironolactone (Aldactone) 100 mg tablet Take one pill by mouth daily        Last Recorded Vitals:  Vitals:    09/03/24 0806   BP: 93/64   Pulse: 84   SpO2: 98%   Weight: 59.6 kg (131 lb 6.4 oz)       Physical Exam:  Physical Exam  Vitals reviewed.   HENT:      Head: Normocephalic.      Nose: Nose normal.   Eyes:      Pupils: Pupils are equal, round, and reactive to light.   Cardiovascular:      Rate and Rhythm: Normal rate and regular rhythm. Occasional ectopic beats  Pulmonary:      Effort: Pulmonary effort is normal.      Breath sounds: Normal breath sounds.   Abdominal:      General: Abdomen is flat.      Palpations: Abdomen is soft.   Musculoskeletal:         General: Normal range of motion.      Cervical back: Normal range of motion.   Skin:     General: Skin is warm and dry.   Neurological:      General: No focal deficit present.      Mental Status: He is alert and oriented to person, place, and time.   Psychiatric:         Mood and Affect: Mood normal.            Last Labs:  CBC -  Lab Results   Component Value Date    WBC 8.4 07/16/2024    HGB 12.1 07/16/2024    HCT 34.0 (L) 07/16/2024    MCV 92 07/16/2024     07/16/2024       CMP -  Lab Results   Component Value Date    CALCIUM 10.9 (H) 07/12/2024    PHOS 2.5 12/19/2023    PROT 7.2 07/12/2024    ALBUMIN 4.8 07/12/2024    AST 19 07/12/2024    ALT 16 07/12/2024    ALKPHOS 44 07/12/2024    BILITOT 0.3 07/12/2024       LIPID PANEL -   Lab Results   Component Value Date    CHOL 220 (H) 12/08/2023    TRIG 79 12/08/2023    HDL 60.7  12/08/2023    CHHDL 3.6 12/08/2023    LDLF 61 06/24/2022    VLDL 16 12/08/2023    NHDL 159 (H) 12/08/2023       RENAL FUNCTION PANEL -   Lab Results   Component Value Date    GLUCOSE 97 07/12/2024     07/12/2024    K 4.8 07/12/2024     07/12/2024    CO2 23 07/12/2024    ANIONGAP 14 07/12/2024    BUN 22 07/12/2024    CREATININE 1.61 (H) 07/12/2024    CALCIUM 10.9 (H) 07/12/2024    PHOS 2.5 12/19/2023    ALBUMIN 4.8 07/12/2024        Lab Results   Component Value Date    HGBA1C 5.4 01/25/2024       Last Cardiology Tests:  ECG:  Electrocardiogram, 12-lead PRN ACS symptoms     Echo:      Ejection Fractions:    Cath:    Stress Test:    Cardiac Imaging          Assessment/Plan   Mrs. Byrd is a very pleasant 54 year old female with a history of anxiety, depression, gastritis and hypothyroidism, blood pressure has been low. She complains of general fatigue and near syncopal episodes. Start Midodrine 2.5 mg three times a day. She complains of exertional dyspnea, she will have an echocardiogram to evaluate her LV function. Has an occasional ectopic beat. She will have a monitor to evaluate for arrhythmia. Heart rate and blood pressure is well controlled today.     Plan   -call with any questions   -start Midodrine 2.5 mg three times a day   -echocardiogram   -heart monitor for two weeks   -will call to review the results     I appreciate the opportunity to participate in the patient's care, please call with any questions         Chanell Ross, APRN-CNP

## 2024-09-03 NOTE — PATIENT INSTRUCTIONS
CALL WITH ANY QUESTIONS   START MIDODRINE 2.5 MG THREE TIMES A DAY   ECHOCARDIOGRAM   HEART MONITOR FOR TWO WEEKS   WILL CALL TO REVIEW THE RESULTS

## 2024-09-05 DIAGNOSIS — F41.8 DEPRESSION WITH ANXIETY: ICD-10-CM

## 2024-09-05 RX ORDER — ESCITALOPRAM OXALATE 20 MG/1
20 TABLET ORAL DAILY
Qty: 90 TABLET | Refills: 0 | Status: SHIPPED | OUTPATIENT
Start: 2024-09-05

## 2024-09-06 DIAGNOSIS — F41.8 DEPRESSION WITH ANXIETY: ICD-10-CM

## 2024-09-06 DIAGNOSIS — G25.81 RESTLESS LEGS SYNDROME: ICD-10-CM

## 2024-09-09 RX ORDER — ESCITALOPRAM OXALATE 20 MG/1
20 TABLET ORAL DAILY
Qty: 90 TABLET | Refills: 0 | Status: SHIPPED | OUTPATIENT
Start: 2024-09-09

## 2024-09-09 RX ORDER — GABAPENTIN 300 MG/1
CAPSULE ORAL
Qty: 360 CAPSULE | Refills: 0 | Status: SHIPPED | OUTPATIENT
Start: 2024-09-09

## 2024-09-10 DIAGNOSIS — K21.9 GASTROESOPHAGEAL REFLUX DISEASE, UNSPECIFIED WHETHER ESOPHAGITIS PRESENT: ICD-10-CM

## 2024-09-10 DIAGNOSIS — E03.8 SECONDARY HYPOTHYROIDISM: ICD-10-CM

## 2024-09-11 RX ORDER — LEVOTHYROXINE SODIUM 75 UG/1
75 TABLET ORAL DAILY
Qty: 90 TABLET | Refills: 1 | Status: SHIPPED | OUTPATIENT
Start: 2024-09-11 | End: 2025-09-11

## 2024-09-11 RX ORDER — OMEPRAZOLE 40 MG/1
40 CAPSULE, DELAYED RELEASE ORAL
Qty: 90 CAPSULE | Refills: 3 | Status: SHIPPED | OUTPATIENT
Start: 2024-09-11 | End: 2025-09-06

## 2024-09-17 ENCOUNTER — HOSPITAL ENCOUNTER (OUTPATIENT)
Dept: CARDIOLOGY | Facility: CLINIC | Age: 54
Discharge: HOME | End: 2024-09-17
Payer: COMMERCIAL

## 2024-09-17 DIAGNOSIS — R06.02 SHORTNESS OF BREATH: ICD-10-CM

## 2024-09-17 DIAGNOSIS — R00.2 HEART PALPITATIONS: ICD-10-CM

## 2024-09-17 LAB
AORTIC VALVE PEAK VELOCITY: 1.33 M/S
AV PEAK GRADIENT: 7.1 MMHG
AVA (PEAK VEL): 3.44 CM2
EJECTION FRACTION APICAL 4 CHAMBER: 59.7
EJECTION FRACTION: 58 %
LEFT ATRIUM VOLUME AREA LENGTH INDEX BSA: 22.9 ML/M2
LEFT VENTRICLE INTERNAL DIMENSION DIASTOLE: 3.9 CM (ref 3.5–6)
LEFT VENTRICULAR OUTFLOW TRACT DIAMETER: 2.3 CM
MITRAL VALVE E/A RATIO: 1.29
MITRAL VALVE E/E' RATIO: 4.4
RIGHT VENTRICLE FREE WALL PEAK S': 11.2 CM/S
RIGHT VENTRICLE PEAK SYSTOLIC PRESSURE: 29.4 MMHG
TRICUSPID ANNULAR PLANE SYSTOLIC EXCURSION: 1.7 CM

## 2024-09-17 PROCEDURE — 93306 TTE W/DOPPLER COMPLETE: CPT

## 2024-09-17 PROCEDURE — 93246 EXT ECG>7D<15D RECORDING: CPT

## 2024-09-17 PROCEDURE — 93306 TTE W/DOPPLER COMPLETE: CPT | Performed by: INTERNAL MEDICINE

## 2024-09-30 ENCOUNTER — APPOINTMENT (OUTPATIENT)
Dept: PRIMARY CARE | Facility: CLINIC | Age: 54
End: 2024-09-30
Payer: COMMERCIAL

## 2024-09-30 VITALS
SYSTOLIC BLOOD PRESSURE: 112 MMHG | OXYGEN SATURATION: 95 % | WEIGHT: 139 LBS | BODY MASS INDEX: 23.86 KG/M2 | HEART RATE: 64 BPM | DIASTOLIC BLOOD PRESSURE: 75 MMHG

## 2024-09-30 DIAGNOSIS — G25.81 RESTLESS LEGS SYNDROME: ICD-10-CM

## 2024-09-30 DIAGNOSIS — Z23 NEED FOR INFLUENZA VACCINATION: ICD-10-CM

## 2024-09-30 DIAGNOSIS — I95.9 HYPOTENSION, UNSPECIFIED HYPOTENSION TYPE: ICD-10-CM

## 2024-09-30 DIAGNOSIS — Z12.31 ENCOUNTER FOR SCREENING MAMMOGRAM FOR MALIGNANT NEOPLASM OF BREAST: ICD-10-CM

## 2024-09-30 DIAGNOSIS — E03.8 SECONDARY HYPOTHYROIDISM: Primary | ICD-10-CM

## 2024-09-30 DIAGNOSIS — R11.0 NAUSEA IN ADULT: ICD-10-CM

## 2024-09-30 DIAGNOSIS — R79.89 ELEVATED SERUM CREATININE: ICD-10-CM

## 2024-09-30 PROCEDURE — 90471 IMMUNIZATION ADMIN: CPT | Performed by: INTERNAL MEDICINE

## 2024-09-30 PROCEDURE — 99214 OFFICE O/P EST MOD 30 MIN: CPT | Performed by: INTERNAL MEDICINE

## 2024-09-30 PROCEDURE — 1036F TOBACCO NON-USER: CPT | Performed by: INTERNAL MEDICINE

## 2024-09-30 PROCEDURE — 90656 IIV3 VACC NO PRSV 0.5 ML IM: CPT | Performed by: INTERNAL MEDICINE

## 2024-09-30 RX ORDER — LEVOTHYROXINE SODIUM 75 UG/1
75 TABLET ORAL DAILY
Qty: 90 TABLET | Refills: 1 | Status: SHIPPED | OUTPATIENT
Start: 2024-09-30

## 2024-09-30 RX ORDER — GABAPENTIN 300 MG/1
CAPSULE ORAL
Qty: 360 CAPSULE | Refills: 1 | Status: SHIPPED | OUTPATIENT
Start: 2024-09-30

## 2024-09-30 RX ORDER — ONDANSETRON 8 MG/1
8 TABLET, ORALLY DISINTEGRATING ORAL EVERY 8 HOURS PRN
Qty: 60 TABLET | Refills: 0 | Status: SHIPPED | OUTPATIENT
Start: 2024-09-30

## 2024-09-30 RX ORDER — LEVOTHYROXINE SODIUM 75 UG/1
75 TABLET ORAL DAILY
COMMUNITY
End: 2024-09-30 | Stop reason: SDUPTHER

## 2024-09-30 NOTE — PROGRESS NOTES
Subjective   Patient ID: Paulie Byrd is a 54 y.o. female who presents for Follow-up and Med Management.    HPI   Saw cardiology recently due to hypotension. Had TTE and zio patch. TTE with LVEF 55-60%.  BP at home 105-110/65-75 while on midodrine.  BP was 80s-90s/60s-70s.  Stomach doing well.  Taking liquid multivitamin and liquid iron every morning.    Experiences periodic non radiating chest pain, left sternal, at rest. Some days gets SOB with mild exertion (bedroom to kitchen). Notices palpitations.    Review of Systems   All other systems reviewed and are negative.    Objective   /75   Pulse 64   Wt 63 kg (139 lb)   SpO2 95%   BMI 23.86 kg/m²     Physical Exam  Constitutional:       General: She is not in acute distress.     Appearance: Normal appearance. She is not ill-appearing.   HENT:      Head: Normocephalic and atraumatic.   Eyes:      Conjunctiva/sclera: Conjunctivae normal.   Cardiovascular:      Rate and Rhythm: Normal rate and regular rhythm.   Pulmonary:      Effort: Pulmonary effort is normal.      Breath sounds: Normal breath sounds.   Abdominal:      General: Abdomen is flat.      Palpations: Abdomen is soft.      Tenderness: There is no abdominal tenderness.   Skin:     General: Skin is warm and dry.   Neurological:      Mental Status: She is alert.   Psychiatric:         Mood and Affect: Mood normal.         Behavior: Behavior normal.           Assessment/Plan   #Hypothyroidism  -TSH ordered   -refill levothyroxine    #Hypotension  -being evaluated by cardiology  -TTE with LVEF 55-60%, mild tricuspid regurge  -zio patch results pending  -BP improved on midodrine  -cont midodrine  -AM Cortisol ordered     #Restless leg  -refill gabapentin    Flu: today  Covid: x3  Shingrix: x1 (Encouraged to get 2nd dose)  Mamm: 1/17/23 - order is in  CRC: colonoscopy 3/5/24 - 10 years    RTC 6 months.       Fabrizio Savage, DO  Internal Medicine PGY-2    I saw and evaluated the patient. I personally  obtained the key and critical portions of the history and physical exam or was physically present for key and critical portions performed by the resident/fellow. I reviewed the resident/fellow's documentation and discussed the patient with the resident/fellow. I agree with the resident/fellow's medical decision making as documented in the note.    Mukesh Yeung, DO

## 2024-10-01 ENCOUNTER — LAB (OUTPATIENT)
Dept: LAB | Facility: LAB | Age: 54
End: 2024-10-01
Payer: COMMERCIAL

## 2024-10-01 DIAGNOSIS — R79.89 ELEVATED SERUM CREATININE: ICD-10-CM

## 2024-10-01 DIAGNOSIS — E03.8 SECONDARY HYPOTHYROIDISM: ICD-10-CM

## 2024-10-01 DIAGNOSIS — I95.9 HYPOTENSION, UNSPECIFIED HYPOTENSION TYPE: ICD-10-CM

## 2024-10-01 LAB
ANION GAP SERPL CALC-SCNC: 14 MMOL/L (ref 10–20)
APPEARANCE UR: CLEAR
BILIRUB UR STRIP.AUTO-MCNC: NEGATIVE MG/DL
BUN SERPL-MCNC: 13 MG/DL (ref 6–23)
CALCIUM SERPL-MCNC: 9.3 MG/DL (ref 8.6–10.6)
CHLORIDE SERPL-SCNC: 105 MMOL/L (ref 98–107)
CO2 SERPL-SCNC: 25 MMOL/L (ref 21–32)
COLOR UR: YELLOW
CORTIS AM PEAK SERPL-MSCNC: 18.4 UG/DL (ref 5–20)
CREAT SERPL-MCNC: 0.87 MG/DL (ref 0.5–1.05)
EGFRCR SERPLBLD CKD-EPI 2021: 79 ML/MIN/1.73M*2
GLUCOSE SERPL-MCNC: 103 MG/DL (ref 74–99)
GLUCOSE UR STRIP.AUTO-MCNC: NORMAL MG/DL
KETONES UR STRIP.AUTO-MCNC: NEGATIVE MG/DL
LEUKOCYTE ESTERASE UR QL STRIP.AUTO: NEGATIVE
NITRITE UR QL STRIP.AUTO: NEGATIVE
PH UR STRIP.AUTO: 5 [PH]
POTASSIUM SERPL-SCNC: 4.1 MMOL/L (ref 3.5–5.3)
PROT UR STRIP.AUTO-MCNC: NEGATIVE MG/DL
RBC # UR STRIP.AUTO: NEGATIVE /UL
SODIUM SERPL-SCNC: 140 MMOL/L (ref 136–145)
SP GR UR STRIP.AUTO: 1.01
TSH SERPL-ACNC: 2.47 MIU/L (ref 0.44–3.98)
UROBILINOGEN UR STRIP.AUTO-MCNC: NORMAL MG/DL

## 2024-10-01 PROCEDURE — 36415 COLL VENOUS BLD VENIPUNCTURE: CPT

## 2024-10-01 PROCEDURE — 80048 BASIC METABOLIC PNL TOTAL CA: CPT

## 2024-10-01 PROCEDURE — 84443 ASSAY THYROID STIM HORMONE: CPT

## 2024-10-01 PROCEDURE — 81003 URINALYSIS AUTO W/O SCOPE: CPT

## 2024-10-01 PROCEDURE — 82533 TOTAL CORTISOL: CPT

## 2024-10-16 DIAGNOSIS — J01.90 ACUTE NON-RECURRENT SINUSITIS, UNSPECIFIED LOCATION: Primary | ICD-10-CM

## 2024-10-16 RX ORDER — AMOXICILLIN AND CLAVULANATE POTASSIUM 875; 125 MG/1; MG/1
875 TABLET, FILM COATED ORAL 2 TIMES DAILY
Qty: 14 TABLET | Refills: 0 | Status: SHIPPED | OUTPATIENT
Start: 2024-10-16 | End: 2024-10-23

## 2024-10-16 RX ORDER — PREDNISONE 20 MG/1
20 TABLET ORAL 2 TIMES DAILY
Qty: 10 TABLET | Refills: 0 | Status: SHIPPED | OUTPATIENT
Start: 2024-10-16 | End: 2024-10-21

## 2024-10-21 DIAGNOSIS — B37.9 YEAST INFECTION: Primary | ICD-10-CM

## 2024-10-21 RX ORDER — FLUCONAZOLE 150 MG/1
150 TABLET ORAL ONCE
Qty: 1 TABLET | Refills: 1 | Status: SHIPPED | OUTPATIENT
Start: 2024-10-21 | End: 2024-10-21

## 2024-11-01 DIAGNOSIS — F41.8 DEPRESSION WITH ANXIETY: ICD-10-CM

## 2024-11-03 RX ORDER — ESCITALOPRAM OXALATE 20 MG/1
20 TABLET ORAL DAILY
Qty: 90 TABLET | Refills: 1 | Status: SHIPPED | OUTPATIENT
Start: 2024-11-03

## 2024-11-04 DIAGNOSIS — L70.0 SUPERFICIAL ACNE VULGARIS: ICD-10-CM

## 2024-11-04 RX ORDER — CLINDAMYCIN PHOSPHATE 11.9 MG/ML
SOLUTION TOPICAL
Qty: 60 ML | Refills: 11 | Status: SHIPPED | OUTPATIENT
Start: 2024-11-04

## 2024-11-04 RX ORDER — BENZOYL PEROXIDE 50 MG/ML
LIQUID TOPICAL
Qty: 236 G | Refills: 11 | Status: SHIPPED | OUTPATIENT
Start: 2024-11-04

## 2024-11-05 ENCOUNTER — APPOINTMENT (OUTPATIENT)
Dept: DERMATOLOGY | Facility: CLINIC | Age: 54
End: 2024-11-05
Payer: COMMERCIAL

## 2024-11-07 ENCOUNTER — OFFICE VISIT (OUTPATIENT)
Dept: PRIMARY CARE | Facility: CLINIC | Age: 54
End: 2024-11-07
Payer: COMMERCIAL

## 2024-11-07 VITALS
HEART RATE: 67 BPM | BODY MASS INDEX: 24.72 KG/M2 | OXYGEN SATURATION: 98 % | SYSTOLIC BLOOD PRESSURE: 110 MMHG | DIASTOLIC BLOOD PRESSURE: 66 MMHG | WEIGHT: 144 LBS

## 2024-11-07 DIAGNOSIS — B02.8 HERPES ZOSTER WITH COMPLICATION: Primary | ICD-10-CM

## 2024-11-07 PROCEDURE — 99213 OFFICE O/P EST LOW 20 MIN: CPT | Performed by: INTERNAL MEDICINE

## 2024-11-07 RX ORDER — GABAPENTIN 300 MG/1
300 CAPSULE ORAL 3 TIMES DAILY
Qty: 60 CAPSULE | Refills: 0 | Status: SHIPPED | OUTPATIENT
Start: 2024-11-07 | End: 2024-11-27

## 2024-11-07 RX ORDER — VALACYCLOVIR HYDROCHLORIDE 1 G/1
1000 TABLET, FILM COATED ORAL 3 TIMES DAILY
Qty: 21 TABLET | Refills: 0 | Status: SHIPPED | OUTPATIENT
Start: 2024-11-07 | End: 2024-11-14

## 2024-11-07 NOTE — PROGRESS NOTES
Subjective   Patient ID: Paulie Byrd is a 54 y.o. female who presents for Herpes Zoster.    Herpes Zoster       Patient is here for evaluation of rash on right flank.  The rash started yesterday 11/6 at work with a burning sensation on her right flank.  She noted it was slightly raised yesterday.   Today she reports anything that touches her rash illicits severe shooting pain.    She reports the rash itches and burns.  She had chicken pox as a kid and has already had shingles once.  Had zoster vaccine in 2023.     Objective   /66   Pulse 67   Wt 65.3 kg (144 lb)   SpO2 98%   BMI 24.72 kg/m²     Physical Exam  HENT:      Head: Normocephalic.   Cardiovascular:      Rate and Rhythm: Normal rate.      Heart sounds:      No friction rub. No gallop.   Pulmonary:      Effort: Pulmonary effort is normal. No respiratory distress.   Abdominal:      Palpations: Abdomen is soft.   Skin:     General: Skin is warm and dry.      Findings: Erythema and rash present.      Comments: Right flank vesicular lesion on an erythematous base that does not cross midline.   Extremely tender to palpation     Neurological:      Mental Status: She is alert and oriented to person, place, and time.   Psychiatric:         Mood and Affect: Mood normal.         Behavior: Behavior normal.         Assessment/Plan   #Herpes Zoster Rash  -Start valacyclovir 1000mg three times a day for seven days, and gabapentin 300mg three times a day.    Livia Hui DO, PGY-1  Internal Medicine

## 2024-11-07 NOTE — PATIENT INSTRUCTIONS
Take valacyclovir 1000mg three times a day for seven days, and gabapentin 300mg three times a day.

## 2024-11-22 DIAGNOSIS — B02.8 HERPES ZOSTER WITH COMPLICATION: ICD-10-CM

## 2024-11-25 DIAGNOSIS — E03.8 SECONDARY HYPOTHYROIDISM: Primary | ICD-10-CM

## 2024-11-25 RX ORDER — GABAPENTIN 300 MG/1
300 CAPSULE ORAL 3 TIMES DAILY
Qty: 90 CAPSULE | Refills: 0 | Status: SHIPPED | OUTPATIENT
Start: 2024-11-25 | End: 2024-12-15

## 2024-11-26 DIAGNOSIS — E03.8 SECONDARY HYPOTHYROIDISM: ICD-10-CM

## 2024-11-26 RX ORDER — LEVOTHYROXINE SODIUM 75 UG/1
75 TABLET ORAL DAILY
Qty: 90 TABLET | Refills: 1 | Status: SHIPPED | OUTPATIENT
Start: 2024-11-26 | End: 2025-11-26

## 2024-11-27 RX ORDER — LEVOTHYROXINE SODIUM 75 UG/1
TABLET ORAL
Qty: 90 TABLET | Refills: 1 | OUTPATIENT
Start: 2024-11-27

## 2024-12-03 ENCOUNTER — APPOINTMENT (OUTPATIENT)
Dept: PRIMARY CARE | Facility: CLINIC | Age: 54
End: 2024-12-03
Payer: COMMERCIAL

## 2025-01-03 DIAGNOSIS — G25.81 RESTLESS LEGS SYNDROME: Primary | ICD-10-CM

## 2025-01-03 RX ORDER — GABAPENTIN 300 MG/1
CAPSULE ORAL
Qty: 360 CAPSULE | Refills: 3 | Status: SHIPPED | OUTPATIENT
Start: 2025-01-03

## 2025-01-07 ENCOUNTER — LAB (OUTPATIENT)
Dept: LAB | Facility: LAB | Age: 55
End: 2025-01-07
Payer: COMMERCIAL

## 2025-01-07 DIAGNOSIS — R53.83 FATIGUE, UNSPECIFIED TYPE: ICD-10-CM

## 2025-01-25 DIAGNOSIS — R11.0 NAUSEA IN ADULT: ICD-10-CM

## 2025-01-28 RX ORDER — ONDANSETRON 8 MG/1
TABLET, ORALLY DISINTEGRATING ORAL
Qty: 60 TABLET | Refills: 0 | Status: SHIPPED | OUTPATIENT
Start: 2025-01-28

## 2025-01-29 ENCOUNTER — LAB (OUTPATIENT)
Dept: LAB | Facility: HOSPITAL | Age: 55
End: 2025-01-29
Payer: COMMERCIAL

## 2025-01-29 DIAGNOSIS — K56.7 ILEUS (MULTI): ICD-10-CM

## 2025-01-29 DIAGNOSIS — D50.0 IRON DEFICIENCY ANEMIA DUE TO CHRONIC BLOOD LOSS: ICD-10-CM

## 2025-01-29 DIAGNOSIS — K90.9 IDIOPATHIC STEATORRHEA (HHS-HCC): ICD-10-CM

## 2025-01-29 LAB
BASOPHILS # BLD AUTO: 0.04 X10*3/UL (ref 0–0.1)
BASOPHILS NFR BLD AUTO: 0.6 %
EOSINOPHIL # BLD AUTO: 0.11 X10*3/UL (ref 0–0.7)
EOSINOPHIL NFR BLD AUTO: 1.8 %
ERYTHROCYTE [DISTWIDTH] IN BLOOD BY AUTOMATED COUNT: 13.3 % (ref 11.5–14.5)
FERRITIN SERPL-MCNC: 42 NG/ML (ref 8–150)
HCT VFR BLD AUTO: 36 % (ref 36–46)
HGB BLD-MCNC: 11.9 G/DL (ref 12–16)
IMM GRANULOCYTES # BLD AUTO: 0.02 X10*3/UL (ref 0–0.7)
IMM GRANULOCYTES NFR BLD AUTO: 0.3 % (ref 0–0.9)
IRON SATN MFR SERPL: 26 % (ref 25–45)
IRON SERPL-MCNC: 92 UG/DL (ref 35–150)
LDH SERPL L TO P-CCNC: 165 U/L (ref 84–246)
LYMPHOCYTES # BLD AUTO: 2.9 X10*3/UL (ref 1.2–4.8)
LYMPHOCYTES NFR BLD AUTO: 46.7 %
MCH RBC QN AUTO: 32.3 PG (ref 26–34)
MCHC RBC AUTO-ENTMCNC: 33.1 G/DL (ref 32–36)
MCV RBC AUTO: 98 FL (ref 80–100)
MONOCYTES # BLD AUTO: 0.41 X10*3/UL (ref 0.1–1)
MONOCYTES NFR BLD AUTO: 6.6 %
NEUTROPHILS # BLD AUTO: 2.73 X10*3/UL (ref 1.2–7.7)
NEUTROPHILS NFR BLD AUTO: 44 %
NRBC BLD-RTO: 0 /100 WBCS (ref 0–0)
PLATELET # BLD AUTO: 404 X10*3/UL (ref 150–450)
RBC # BLD AUTO: 3.68 X10*6/UL (ref 4–5.2)
TIBC SERPL-MCNC: 358 UG/DL (ref 240–445)
UIBC SERPL-MCNC: 266 UG/DL (ref 110–370)
WBC # BLD AUTO: 6.2 X10*3/UL (ref 4.4–11.3)

## 2025-01-29 PROCEDURE — 83540 ASSAY OF IRON: CPT

## 2025-01-29 PROCEDURE — 82607 VITAMIN B-12: CPT

## 2025-01-29 PROCEDURE — 83550 IRON BINDING TEST: CPT

## 2025-01-29 PROCEDURE — 83615 LACTATE (LD) (LDH) ENZYME: CPT

## 2025-01-29 PROCEDURE — 82728 ASSAY OF FERRITIN: CPT

## 2025-01-29 PROCEDURE — 85025 COMPLETE CBC W/AUTO DIFF WBC: CPT

## 2025-01-30 LAB — VIT B12 SERPL-MCNC: 961 PG/ML (ref 211–911)

## 2025-02-10 ENCOUNTER — APPOINTMENT (OUTPATIENT)
Dept: HEMATOLOGY/ONCOLOGY | Facility: CLINIC | Age: 55
End: 2025-02-10
Payer: COMMERCIAL

## 2025-02-26 ENCOUNTER — OFFICE VISIT (OUTPATIENT)
Dept: HEMATOLOGY/ONCOLOGY | Facility: CLINIC | Age: 55
End: 2025-02-26
Payer: COMMERCIAL

## 2025-02-26 VITALS
TEMPERATURE: 97.5 F | SYSTOLIC BLOOD PRESSURE: 101 MMHG | OXYGEN SATURATION: 98 % | DIASTOLIC BLOOD PRESSURE: 71 MMHG | WEIGHT: 147.49 LBS | RESPIRATION RATE: 17 BRPM | HEART RATE: 81 BPM | BODY MASS INDEX: 25.32 KG/M2

## 2025-02-26 DIAGNOSIS — K90.9 IDIOPATHIC STEATORRHEA (HHS-HCC): ICD-10-CM

## 2025-02-26 DIAGNOSIS — D50.0 IRON DEFICIENCY ANEMIA DUE TO CHRONIC BLOOD LOSS: ICD-10-CM

## 2025-02-26 DIAGNOSIS — K56.7 ILEUS (MULTI): ICD-10-CM

## 2025-02-26 PROCEDURE — 99214 OFFICE O/P EST MOD 30 MIN: CPT | Performed by: PHYSICIAN ASSISTANT

## 2025-02-26 ASSESSMENT — PATIENT HEALTH QUESTIONNAIRE - PHQ9
1. LITTLE INTEREST OR PLEASURE IN DOING THINGS: NOT AT ALL
SUM OF ALL RESPONSES TO PHQ9 QUESTIONS 1 AND 2: 0
2. FEELING DOWN, DEPRESSED OR HOPELESS: NOT AT ALL

## 2025-02-26 ASSESSMENT — PAIN SCALES - GENERAL: PAINLEVEL_OUTOF10: 0-NO PAIN

## 2025-02-26 NOTE — PROGRESS NOTES
"Reason for Visit  Paulie Byrd is a 54 y.o. female self referred for chronic normocytic anemia after admission for colonic ileus.    Upon review of labs, initially noted to have anemia since 4/2023, hemoglobin in the 9-10 ranges, with her episode of acute pancreatitis. Normal platelet and WBC count.    Admitted in 12/2023 for olonic ileus managed conservatively with NG tube, surgery not required. Showed acute kidney injury likely secondary to prerenal azotemia for which she was given IV fluids.     On assessment, noted to have 200 lbs weight loss after her father death a couple of years ago. Recently, she alternates between diarrhea and constipation. Patient has been diagnosed with pancreatic enzyme insufficiency and takes pancreatic enzymes when she remembers. She was diagnosed with gastroparesis by her former GI but was told the medication to treat this can cause neurological conditions and since her mother had MS she didn't want to take it. She has had EGDs with dilations for dysphagia and c-scopes for hemorroids and polyps. Recently, she feels fatigue and has \"zero appetite\". Denies f/c/night sweats, SOB, CP, n/v/d/abd pain, bleeding from any source, neuropathy, rash or any other complaint at this time.    PMH/PSH: Gastroparesis, chronic pancreatitis due to ETOH, gallstones s/p cholecystectomy, hysterectomy, Graves s/p ablation, now w/hypothyroidism, alopecia, b/l knee replacement.  FH: father w/presumed stomach cancer, MGM-colon cancer  Soc Hx: former smoker, ETOH and drug user, sober/clean x 15 years; MA at , , 2 sons.    History of Present Illness:  Patient presents for follow up. On assessment, reports continued fatigue. 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 Seen by cards placed on Midodrine for orthostatic hypotention and feels slightly better. Otherwise doing well.     Review of Systems: All of the systems have been reviewed and are negative for complaints except what is stated in " the HPI and/or past medical history.    Allergies   Allergen Reactions    Codeine GI Upset, Other and Nausea/vomiting    Grass Pollen Unknown     Outpatient Medication Profile:  Current Outpatient Medications   Medication Sig Dispense Refill    benzoyl peroxide (Advanced Exfoliating Cleanser) 5 % external wash Apply to affected areas of skin as a wash, let set for 2 minutes, rinse thoroughly. Use daily in  g 11    clindamycin (Cleocin T) 1 % external solution Apply to affected areas twice daily. 60 mL 11    escitalopram (Lexapro) 20 mg tablet Take 1 tablet by mouth once daily. 90 tablet 1    gabapentin (Neurontin) 300 mg capsule Take one capsule every 8 hours and and 1 extra capsule capsule at bedtime 360 capsule 3    iron,carb/vit C/vit B12/folic (IRON 100 PLUS ORAL) Take by mouth.      levothyroxine (Synthroid, Levoxyl) 75 mcg tablet Take 1 tablet (75 mcg) by mouth early in the morning.. Take on an empty stomach at the same time each day, either 30 to 60 minutes prior to breakfast 90 tablet 1    midodrine (Proamatine) 2.5 mg tablet Take 1 tablet (2.5 mg) by mouth 3 times daily (morning, midday, late afternoon). 90 tablet 3    omeprazole (PriLOSEC) 40 mg DR capsule Take 1 capsule by mouth once daily in the morning before a meal. Do not crush or chew. 90 capsule 3    ondansetron ODT (Zofran-ODT) 8 mg disintegrating tablet Dissolve 1 tablet by mouth every 8 hours if needed for nausea or vomiting. 60 tablet 0    potassium chloride CR 20 mEq ER tablet Take 1 tablet (20 mEq) by mouth once daily. Do not crush or chew. 90 tablet 3    propranolol (Inderal) 20 mg tablet Take 1 tablet (20 mg) by mouth 3 times a day as needed (for anxiety). (Patient not taking: Reported on 11/7/2024) 60 tablet 3    Synthroid 75 mcg tablet Take 1 tablet (75 mcg) by mouth early in the morning.. Take on an empty stomach at the same time each day, either 30 to 60 minutes prior to breakfast 90 tablet 1     No current facility-administered  medications for this visit.      Vitals and Measurements:       7/16/2024    12:59 PM 7/16/2024     1:09 PM 7/16/2024     1:22 PM 9/3/2024     8:06 AM 9/30/2024    10:41 AM 11/7/2024    10:35 AM 2/26/2025     2:31 PM   Vitals   Systolic 93 89 87 93 112 110 101   Diastolic 63 62 59 64 75 66 71   BP Location       Left arm   Heart Rate 63 61 62 84 64 67 81   Temp  36.2 °C (97.2 °F)     36.4 °C (97.5 °F)   Resp 18 18 18    17   Weight (lb)    131.4 139 144 147.49   BMI    22.55 kg/m2 23.86 kg/m2 24.72 kg/m2 25.32 kg/m2   BSA (m2)    1.64 m2 1.69 m2 1.72 m2 1.74 m2   Visit Report    Report Report Report Report     Physical Exam:   Constitutional: alert, awake and oriented, not in acute distress   HEENT: moist mucous membranes, normal nose   Neck: supple, no lymphadenopathy   EYES: PERRL, EOM intact, conjunctiva normal  Skin: no jaundice, rash or erythema  Neurological: AAOx3, no gross focal deficit   Psychiatric: normal mood and behavior     Lab on 01/29/2025   Component Date Value Ref Range Status    WBC 01/29/2025 6.2  4.4 - 11.3 x10*3/uL Final    nRBC 01/29/2025 0.0  0.0 - 0.0 /100 WBCs Final    RBC 01/29/2025 3.68 (L)  4.00 - 5.20 x10*6/uL Final    Hemoglobin 01/29/2025 11.9 (L)  12.0 - 16.0 g/dL Final    Hematocrit 01/29/2025 36.0  36.0 - 46.0 % Final    MCV 01/29/2025 98  80 - 100 fL Final    MCH 01/29/2025 32.3  26.0 - 34.0 pg Final    MCHC 01/29/2025 33.1  32.0 - 36.0 g/dL Final    RDW 01/29/2025 13.3  11.5 - 14.5 % Final    Platelets 01/29/2025 404  150 - 450 x10*3/uL Final    Neutrophils % 01/29/2025 44.0  40.0 - 80.0 % Final    Immature Granulocytes %, Automated 01/29/2025 0.3  0.0 - 0.9 % Final    Immature Granulocyte Count (IG) includes promyelocytes, myelocytes and metamyelocytes but does not include bands. Percent differential counts (%) should be interpreted in the context of the absolute cell counts (cells/UL).    Lymphocytes % 01/29/2025 46.7  13.0 - 44.0 % Final    Monocytes % 01/29/2025 6.6  2.0 -  10.0 % Final    Eosinophils % 01/29/2025 1.8  0.0 - 6.0 % Final    Basophils % 01/29/2025 0.6  0.0 - 2.0 % Final    Neutrophils Absolute 01/29/2025 2.73  1.20 - 7.70 x10*3/uL Final    Percent differential counts (%) should be interpreted in the context of the absolute cell counts (cells/uL).    Immature Granulocytes Absolute, Au* 01/29/2025 0.02  0.00 - 0.70 x10*3/uL Final    Lymphocytes Absolute 01/29/2025 2.90  1.20 - 4.80 x10*3/uL Final    Monocytes Absolute 01/29/2025 0.41  0.10 - 1.00 x10*3/uL Final    Eosinophils Absolute 01/29/2025 0.11  0.00 - 0.70 x10*3/uL Final    Basophils Absolute 01/29/2025 0.04  0.00 - 0.10 x10*3/uL Final    Iron 01/29/2025 92  35 - 150 ug/dL Final    UIBC 01/29/2025 266  110 - 370 ug/dL Final    TIBC 01/29/2025 358  240 - 445 ug/dL Final    % Saturation 01/29/2025 26  25 - 45 % Final    LDH 01/29/2025 165  84 - 246 U/L Final    Ferritin 01/29/2025 42  8 - 150 ng/mL Final    Vitamin B12 01/29/2025 961 (H)  211 - 911 pg/mL Final   Lab on 10/01/2024   Component Date Value Ref Range Status    Glucose 10/01/2024 103 (H)  74 - 99 mg/dL Final    Sodium 10/01/2024 140  136 - 145 mmol/L Final    Potassium 10/01/2024 4.1  3.5 - 5.3 mmol/L Final    Chloride 10/01/2024 105  98 - 107 mmol/L Final    Bicarbonate 10/01/2024 25  21 - 32 mmol/L Final    Anion Gap 10/01/2024 14  10 - 20 mmol/L Final    Urea Nitrogen 10/01/2024 13  6 - 23 mg/dL Final    Creatinine 10/01/2024 0.87  0.50 - 1.05 mg/dL Final    eGFR 10/01/2024 79  >60 mL/min/1.73m*2 Final    Calculations of estimated GFR are performed using the 2021 CKD-EPI Study Refit equation without the race variable for the IDMS-Traceable creatinine methods.  https://jasn.asnjournals.org/content/early/2021/09/22/ASN.1057923939    Calcium 10/01/2024 9.3  8.6 - 10.6 mg/dL Final    Color, Urine 10/01/2024 Yellow  Light-Yellow, Yellow, Dark-Yellow Final    Appearance, Urine 10/01/2024 Clear  Clear Final    Specific Gravity, Urine 10/01/2024 1.010  1.005 -  1.035 Final    pH, Urine 10/01/2024 5.0  5.0, 5.5, 6.0, 6.5, 7.0, 7.5, 8.0 Final    Protein, Urine 10/01/2024 NEGATIVE  NEGATIVE, 10 (TRACE), 20 (TRACE) mg/dL Final    Glucose, Urine 10/01/2024 Normal  Normal mg/dL Final    Blood, Urine 10/01/2024 NEGATIVE  NEGATIVE Final    Ketones, Urine 10/01/2024 NEGATIVE  NEGATIVE mg/dL Final    Bilirubin, Urine 10/01/2024 NEGATIVE  NEGATIVE Final    Urobilinogen, Urine 10/01/2024 Normal  Normal mg/dL Final    Nitrite, Urine 10/01/2024 NEGATIVE  NEGATIVE Final    Leukocyte Esterase, Urine 10/01/2024 NEGATIVE  NEGATIVE Final    Cortisol  A.M. 10/01/2024 18.4  5.0 - 20.0 ug/dL Final    Thyroid Stimulating Hormone 10/01/2024 2.47  0.44 - 3.98 mIU/L Final   Hospital Outpatient Visit on 09/17/2024   Component Date Value Ref Range Status    AV pk travis 09/17/2024 1.33  m/s Final    LVOT diam 09/17/2024 2.30  cm Final    MV avg E/e' ratio 09/17/2024 4.40   Final    MV E/A ratio 09/17/2024 1.29   Final    LA vol index A/L 09/17/2024 22.9  ml/m2 Final    Tricuspid annular plane systolic e* 09/17/2024 1.7  cm Final    LV EF 09/17/2024 58  % Final    RV free wall pk S' 09/17/2024 11.20  cm/s Final    LVIDd 09/17/2024 3.90  cm Final    RVSP 09/17/2024 29.4  mmHg Final    Aortic Valve Area by Continuity of* 09/17/2024 3.44  cm2 Final    AV pk grad 09/17/2024 7.1  mmHg Final    LV A4C EF 09/17/2024 59.7   Final     Assessment:    53 y.o. female self referred for chronic normocytic anemia after admission for colonic ileus.    Chronic pancreatitis due to ETOH    EPI on PERT and gastroparesis     7/2024: BMBx:  LIMITED BONE MARROW SPECIMEN DEMONSTRATING NORMAL MATURING TRILINEAGE HEMATOPOIESIS, SEE NOTE. SMALL LYMPHOID AGGREGATE, FAVOR BENIGN  The marrow is limited by an aspicular clot and a fragmented disrupted core with only a very small amount of evaluable marrow space. Hematopoietic elements appear normal on the aspirate smear which appears of good quality.  A small lymphoid aggregate was  noted on the core biopsy that was comprised of a mixture of T and B cells. By flow cytometry neither a clonal B or T cell population was detected. Therefore a benign aggregate is favored. No other specific abnormalities were identified by flow cytometry.  Plan:    Reviewed and discussed lab, imaging, and pathology results with patient in detail as well as diagnosis, prognosis, and treatment options.    Anemia improved/stable with improvement in diet    Discussed continue monitoring of CBC and nutritional studies every 6 months prefers to follow up with PCP.    F/UP w/PCP    RTC PRN     Patient verbalized understanding, and all her questions were answered to her satisfaction.     30 min spent with patient greater than 50 % of which was spent in consultation, counselling and coordination of care.

## 2025-03-11 DIAGNOSIS — Z00.01 ENCOUNTER FOR PREVENTATIVE ADULT HEALTH CARE EXAM WITH ABNORMAL FINDINGS: Primary | ICD-10-CM

## 2025-03-12 ENCOUNTER — OFFICE VISIT (OUTPATIENT)
Dept: PRIMARY CARE | Facility: CLINIC | Age: 55
End: 2025-03-12
Payer: COMMERCIAL

## 2025-03-12 VITALS
SYSTOLIC BLOOD PRESSURE: 104 MMHG | DIASTOLIC BLOOD PRESSURE: 77 MMHG | BODY MASS INDEX: 24.72 KG/M2 | OXYGEN SATURATION: 98 % | WEIGHT: 144 LBS | HEART RATE: 68 BPM

## 2025-03-12 DIAGNOSIS — E03.8 SECONDARY HYPOTHYROIDISM: ICD-10-CM

## 2025-03-12 DIAGNOSIS — E78.5 HYPERLIPIDEMIA, UNSPECIFIED HYPERLIPIDEMIA TYPE: ICD-10-CM

## 2025-03-12 DIAGNOSIS — Z00.01 ENCOUNTER FOR PREVENTATIVE ADULT HEALTH CARE EXAM WITH ABNORMAL FINDINGS: Primary | ICD-10-CM

## 2025-03-12 DIAGNOSIS — I95.9 HYPOTENSION, UNSPECIFIED HYPOTENSION TYPE: ICD-10-CM

## 2025-03-12 DIAGNOSIS — D64.9 ANEMIA, UNSPECIFIED TYPE: ICD-10-CM

## 2025-03-12 LAB
ALBUMIN SERPL-MCNC: 4.6 G/DL (ref 3.6–5.1)
ALP SERPL-CCNC: 45 U/L (ref 37–153)
ALT SERPL-CCNC: 19 U/L (ref 6–29)
ANION GAP SERPL CALCULATED.4IONS-SCNC: 8 MMOL/L (CALC) (ref 7–17)
AST SERPL-CCNC: 19 U/L (ref 10–35)
BILIRUB SERPL-MCNC: 0.3 MG/DL (ref 0.2–1.2)
BUN SERPL-MCNC: 11 MG/DL (ref 7–25)
CALCIUM SERPL-MCNC: 8.9 MG/DL (ref 8.6–10.4)
CHLORIDE SERPL-SCNC: 105 MMOL/L (ref 98–110)
CHOLEST SERPL-MCNC: 245 MG/DL
CHOLEST/HDLC SERPL: 3.6 (CALC)
CO2 SERPL-SCNC: 30 MMOL/L (ref 20–32)
CREAT SERPL-MCNC: 0.97 MG/DL (ref 0.5–1.03)
EGFRCR SERPLBLD CKD-EPI 2021: 69 ML/MIN/1.73M2
ERYTHROCYTE [DISTWIDTH] IN BLOOD BY AUTOMATED COUNT: 13 % (ref 11–15)
EST. AVERAGE GLUCOSE BLD GHB EST-MCNC: 105 MG/DL
EST. AVERAGE GLUCOSE BLD GHB EST-SCNC: 5.8 MMOL/L
GLUCOSE SERPL-MCNC: 82 MG/DL (ref 65–99)
HBA1C MFR BLD: 5.3 % OF TOTAL HGB
HCT VFR BLD AUTO: 32.8 % (ref 35–45)
HDLC SERPL-MCNC: 69 MG/DL
HGB BLD-MCNC: 11.3 G/DL (ref 11.7–15.5)
LDLC SERPL CALC-MCNC: 146 MG/DL (CALC)
MCH RBC QN AUTO: 32.9 PG (ref 27–33)
MCHC RBC AUTO-ENTMCNC: 34.5 G/DL (ref 32–36)
MCV RBC AUTO: 95.6 FL (ref 80–100)
NONHDLC SERPL-MCNC: 176 MG/DL (CALC)
PLATELET # BLD AUTO: 384 THOUSAND/UL (ref 140–400)
PMV BLD REES-ECKER: 8.9 FL (ref 7.5–12.5)
POTASSIUM SERPL-SCNC: 3.9 MMOL/L (ref 3.5–5.3)
PROT SERPL-MCNC: 6.5 G/DL (ref 6.1–8.1)
RBC # BLD AUTO: 3.43 MILLION/UL (ref 3.8–5.1)
SODIUM SERPL-SCNC: 143 MMOL/L (ref 135–146)
TRIGL SERPL-MCNC: 165 MG/DL
TSH SERPL-ACNC: 1.55 MIU/L
WBC # BLD AUTO: 7.4 THOUSAND/UL (ref 3.8–10.8)

## 2025-03-12 PROCEDURE — 99396 PREV VISIT EST AGE 40-64: CPT | Performed by: INTERNAL MEDICINE

## 2025-03-12 PROCEDURE — 1036F TOBACCO NON-USER: CPT | Performed by: INTERNAL MEDICINE

## 2025-03-12 ASSESSMENT — PATIENT HEALTH QUESTIONNAIRE - PHQ9
2. FEELING DOWN, DEPRESSED OR HOPELESS: NOT AT ALL
1. LITTLE INTEREST OR PLEASURE IN DOING THINGS: NOT AT ALL
SUM OF ALL RESPONSES TO PHQ9 QUESTIONS 1 AND 2: 0

## 2025-03-12 ASSESSMENT — ENCOUNTER SYMPTOMS
DIFFICULTY URINATING: 0
FEVER: 0
ABDOMINAL PAIN: 0
VOMITING: 0
BLOOD IN STOOL: 0
NAUSEA: 0
CHILLS: 0

## 2025-03-12 ASSESSMENT — COLUMBIA-SUICIDE SEVERITY RATING SCALE - C-SSRS
1. IN THE PAST MONTH, HAVE YOU WISHED YOU WERE DEAD OR WISHED YOU COULD GO TO SLEEP AND NOT WAKE UP?: NO
6. HAVE YOU EVER DONE ANYTHING, STARTED TO DO ANYTHING, OR PREPARED TO DO ANYTHING TO END YOUR LIFE?: NO
2. HAVE YOU ACTUALLY HAD ANY THOUGHTS OF KILLING YOURSELF?: NO

## 2025-03-12 NOTE — PROGRESS NOTES
Subjective   Patient ID: Paulie Byrd is a 54 y.o. female who presents for Follow-up (labs) and Annual Exam.    HPI   Paulie had no acute concerns today. She had high LDL and triglycerides on lipid panel. She has been on atorvastatin previously, but with her ASCVD risk at 1.3%, Paulie wants to try diet and exercise first. She states her diet hasn't been the greatest the past 2 weeks as a result of stress but is working on it.    Paulie sees Hellen Wagner for her anemia. Her anemia has been stable and takes a liquid multivitamin and liquid iron every morning.    Paulie states her blood pressure is usually in the 100/70s. She measures her blood pressure every morning. She does endorse some bouts of lightheadedness/dizziness but states they go away if she eats salty food. If her blood pressure is lower, she takes her midrodrine.     She states she sometimes gets SOB with mild exertion but finds taking some extra Potassium helps.     Review of Systems   Constitutional:  Negative for chills and fever.   Cardiovascular:  Negative for chest pain and leg swelling.   Gastrointestinal:  Negative for abdominal pain, blood in stool, nausea and vomiting.        Bowel movements are normal   Genitourinary:  Negative for difficulty urinating.   All other systems reviewed and are negative.    Objective   Vitals:    03/12/25 1152   BP: 104/77   Pulse: 68   SpO2: 98%     Physical Exam  Constitutional:       General: She is not in acute distress.     Appearance: Normal appearance. She is not ill-appearing.   HENT:      Head: Normocephalic and atraumatic.   Cardiovascular:      Rate and Rhythm: Normal rate and regular rhythm.      Pulses: Normal pulses.      Heart sounds: Normal heart sounds.   Pulmonary:      Effort: Pulmonary effort is normal. No respiratory distress.      Breath sounds: Normal breath sounds.   Abdominal:      General: There is no distension.      Palpations: Abdomen is soft.      Tenderness: There is no  abdominal tenderness.   Skin:     General: Skin is warm and dry.   Neurological:      Mental Status: She is alert.   Psychiatric:         Mood and Affect: Mood normal.         Behavior: Behavior normal.       Assessment/Plan   #Hypothyroidism  -TSH pending  -Continue levothyroxine 75 mcg    #Anemia  -Follows heme  -Stable  -CBC ordered to do in 6 mo     #Hyperlipidemia  -ASCVD 1.3%-cont lifestyle changes   -Lipid panel ordered for 6 mo     #Hypotension  -Evaluated by cardiology  -TTE with LVEF 55-60%, mild tricuspid regurge  -BP improved on midodrine  -cont midodrine prn    #Restless leg  -continue gabapentin 1200 mg daily    #Depression   -Stable--cont escitalopram 20mg daily    Flu: UTD  Covid: x3  Shingrix: x1 (Encouraged to get 2nd dose)  Prevnar 20: Recommended   Mamm: 1/17/23 - ordered for this year  CRC: colonoscopy 3/5/24 - 10 years    RTC 6 months.       03/12/25 at 12:50 PM - EBENEZER GUZMAN MS3

## 2025-03-14 ENCOUNTER — HOSPITAL ENCOUNTER (OUTPATIENT)
Dept: RADIOLOGY | Facility: CLINIC | Age: 55
Discharge: HOME | End: 2025-03-14
Payer: COMMERCIAL

## 2025-03-14 DIAGNOSIS — Z12.31 ENCOUNTER FOR SCREENING MAMMOGRAM FOR MALIGNANT NEOPLASM OF BREAST: ICD-10-CM

## 2025-03-14 PROCEDURE — 77067 SCR MAMMO BI INCL CAD: CPT

## 2025-03-21 DIAGNOSIS — R11.0 NAUSEA IN ADULT: ICD-10-CM

## 2025-03-21 RX ORDER — ONDANSETRON 8 MG/1
8 TABLET, ORALLY DISINTEGRATING ORAL EVERY 8 HOURS PRN
Qty: 60 TABLET | Refills: 1 | Status: SHIPPED | OUTPATIENT
Start: 2025-03-21

## 2025-03-27 DIAGNOSIS — E03.8 SECONDARY HYPOTHYROIDISM: ICD-10-CM

## 2025-03-27 RX ORDER — LEVOTHYROXINE SODIUM 75 UG/1
TABLET ORAL
Qty: 90 TABLET | Refills: 1 | Status: SHIPPED | OUTPATIENT
Start: 2025-03-27

## 2025-04-07 ENCOUNTER — HOSPITAL ENCOUNTER (OUTPATIENT)
Dept: RADIOLOGY | Facility: HOSPITAL | Age: 55
Discharge: HOME | End: 2025-04-07
Payer: COMMERCIAL

## 2025-04-07 DIAGNOSIS — R92.8 OTHER ABNORMAL AND INCONCLUSIVE FINDINGS ON DIAGNOSTIC IMAGING OF BREAST: ICD-10-CM

## 2025-04-07 PROCEDURE — 76642 ULTRASOUND BREAST LIMITED: CPT | Mod: LT

## 2025-04-07 PROCEDURE — 76642 ULTRASOUND BREAST LIMITED: CPT | Performed by: RADIOLOGY

## 2025-04-07 PROCEDURE — 77066 DX MAMMO INCL CAD BI: CPT | Performed by: RADIOLOGY

## 2025-04-07 PROCEDURE — 77062 BREAST TOMOSYNTHESIS BI: CPT | Performed by: RADIOLOGY

## 2025-04-07 PROCEDURE — 77066 DX MAMMO INCL CAD BI: CPT

## 2025-04-07 PROCEDURE — 76982 USE 1ST TARGET LESION: CPT | Mod: LT

## 2025-04-07 NOTE — PROGRESS NOTES
Subjective   Patient ID: Paulie Byrd is a 54 y.o. female who presents for Left stereo breast biopsy consult, bx on 4/23.  HPI  Patient is new to clinic and presents for Left stereo breast biopsy consult, bx on 4/23.  Patient is referred by Dr. Mukesh Yeung.  Patient had BI mammogram bilateral screening tomosynthesis completed on 3/14/2025. Impression was Right breast asymmetry and left breast calcifications. Diagnostic mammogram and possible ultrasound imaging is recommended.  Patient had BI mammogram bilateral diagnostic tomosynthesis and BI US breast limited left completed on 4/7/2025. Impression was Left breast: Suspicious left breast microcalcifications. Biopsy recommended. Stereotactic biopsy can be offered. Right breast: No abnormalities seen.   Denies any previous breast biopsies.  No palpable lesions in either breast.  No nipple discharge.    Patient does have a history of bone marrow biopsy.  They were unable to obtain adequate fluid.  This was for mild anemia.  Patient is also had colonoscopies for this reason.  BI MAMMO BILATERAL DIAGNOSTIC TOMOSYNTHESIS; BI US BREAST LIMITED  LEFT;  4/7/2025 9:12 am; 4/7/2025 9:31 am     INDICATION:  ,R92.8 Other abnormal and inconclusive findings on diagnostic imaging  of breast      COMPARISON:  March 2025, January 2023 and November 2020      FINDINGS:  MAMMOGRAPHY: 2D and tomosynthesis images were reviewed at 1 mm slice  thickness.      Density:  There are scattered areas of fibroglandular density.      Right breast: The previously seen asymmetric parenchymal density on  the CC view of the right breast posteriorly/medially does not persist  on the special views and was most likely due to superimposition of  breast parenchyma. Left breast: Clustered microcalcifications are  again noted in the upper outer quadrant of the left breast  posteriorly. On the magnification views several of the  microcalcifications appear to be pleomorphic somehow indistinct.  There is no  associated mass.      ULTRASOUND: Targeted ultrasound was performed of the left breast by a  registered sonographer with elastography.      No cystic or solid masses are identified in the area of the  calcifications in the upper outer quadrant of the left breast. Images  from the left axilla demonstrate lymph nodes of normal-size and  internal architecture.      IMPRESSION:  Left breast: Suspicious left breast microcalcifications. Biopsy  recommended. Stereotactic biopsy can be offered. Right breast: No  abnormalities seen.      BI-RADS CATEGORY:  BI-RADS Category:  4 Suspicious.  Recommendation:  Surgical Consultation and Biopsy.  Recommended Date:  Immediate.  Laterality:  Left.      MACRO:  None    BI MAMMO BILATERAL SCREENING TOMOSYNTHESIS;  3/14/2025 3:03 pm    INDICATION:  Screening.      ,Z12.31 Encounter for screening mammogram for malignant neoplasm of  breast      COMPARISON:  01/17/2023 and 10/01/2022      FINDINGS:  2D and tomosynthesis images were reviewed at 1 mm slice thickness.      Density: There are scattered areas of fibroglandular density.      Asymmetry seen in the setting medial right breast on the CC view at  posterior depth. Calcifications are seen in the superolateral left  breast at middle depth.      IMPRESSION:  Right breast asymmetry and left breast calcifications. Diagnostic  mammogram and possible ultrasound imaging is recommended.      BI-RADS CATEGORY:      BI-RADS Category:  0 Incomplete; Need Additional Imaging Evaluation  Recommendation:  Additional Imaging.  Recommended Date:  Immediate.  Laterality:  Bilateral.      MACRO:  None    Previous Biopsy: NO Menarche Age: 9 Age of first delivery: 22 Breastfeed: YES Menopause age: PARTIAL HYSTERECTOMY AT 27 HRT: NO Family history of breast cancer: UNSURE Family history of ovarian cancer: NO Family history of pancreatic cancer: NO G 2 P 2     Social History:  Tobacco Use - YES  Do you use any recreational drugs - NO  Alcohol Use -  NO  Caffeine Intake - YES  Working - FULL TIME   Marital status -   Living with -  SPOUSE     Past Medical History:   Diagnosis Date    Arthritis     Bilateral primary osteoarthritis of knee 01/19/2016    Degenerative arthritis of knee, bilateral    Chronic pain syndrome 06/06/2018    Pain syndrome, chronic    Chronic pancreatitis (Multi)     Contusion of lower back and pelvis, subsequent encounter 09/07/2016    Lumbar contusion, subsequent encounter    Diabetes mellitus (Multi)     Encounter for other preprocedural examination 12/02/2016    Pre-op exam    Encounter for other preprocedural examination 01/19/2016    Preoperative evaluation to rule out surgical contraindication    Fibrocystic breast 2011    Graves disease     Other abnormality of red blood cells 06/23/2015    MCV - raised    Other tear of medial meniscus, current injury, right knee, subsequent encounter 01/19/2016    Acute medial meniscal tear, right, subsequent encounter    Pain in right knee 01/19/2016    Bilateral knee pain    Personal history of other diseases of the musculoskeletal system and connective tissue 11/11/2015    History of muscle pain    Personal history of other diseases of the musculoskeletal system and connective tissue 12/29/2014    History of low back pain    Personal history of other endocrine, nutritional and metabolic disease 06/06/2018    History of hyperglycemia    Personal history of other specified conditions 12/11/2014    History of lump of right breast    Personal history of other specified conditions 04/22/2015    History of fatigue    Unilateral primary osteoarthritis, left knee 12/02/2016    Degenerative joint disease of knee, left    Unilateral primary osteoarthritis, right knee 03/04/2016    Right knee DJD     Family History   Problem Relation Name Age of Onset    Allergies Other       Past Surgical History:   Procedure Laterality Date    ABDOMINAL SURGERY      ADENOIDECTOMY  06/27/2014    Adenoidectomy     "CHOLECYSTECTOMY  06/27/2014    Cholecystectomy    HYSTERECTOMY  06/27/1995    Hysterectomy    JOINT REPLACEMENT      OTHER SURGICAL HISTORY  06/27/2014    Abdominal Surgery    OTHER SURGICAL HISTORY  09/23/2021    Knee replacement    OTHER SURGICAL HISTORY  09/23/2021    Cyst excision    SKIN BIOPSY       Allergies   Allergen Reactions    Codeine GI Upset, Other and Nausea/vomiting    Grass Pollen Unknown       Review of Systems  /82 (BP Location: Left arm, Patient Position: Sitting, BP Cuff Size: Adult)   Pulse 69   Temp 36.4 °C (97.5 °F) (Temporal)   Resp 17   Ht 1.626 m (5' 4\")   Wt 65.3 kg (144 lb)   SpO2 99%   BMI 24.72 kg/m²    Objective   Physical Exam  Physical Exam:  /82 (BP Location: Left arm, Patient Position: Sitting, BP Cuff Size: Adult)   Pulse 69   Temp 36.4 °C (97.5 °F) (Temporal)   Resp 17   Ht 1.626 m (5' 4\")   Wt 65.3 kg (144 lb)   SpO2 99%   BMI 24.72 kg/m²    GENERAL APPEARANCE: Patient appears in no acute distress.   EYES: Sclera non-icteric, PERRLA.   ENT Normal appearance of ears and nose.   NECK/THYROID: Neck: no masses. Thyroid: no masses.   LYMPH NODES: No cervical or supraclavicular lymphadenopathy.   CARDIOVASCULAR Heart: RRR, no murmurs; Carotid bruits: none; Peripheral edema: none.   RESPIRATORY: Lungs: Bilateral inspiratory and expiratory wheezing; no respiratory distress.   BREASTS: Exam done both in upright and supine position. On inspection no skin dimpling, no peau d'orange to present upper left chest wall.; Masses: none palpable in either breast.  Axillary lymphadenopathy: none.   GI (ABDOMEN) No intraabdominal mass appreciated, no hepatosplenomegaly; Hernia: none; Tenderness: none.   PSYCH: Patient oriented to time, place and person, normal affect.    Assessment/Plan   Problem List Items Addressed This Visit             ICD-10-CM    Microcalcification of left breast on mammogram - Primary R92.0     With abnormal microcalcifications upper outer quadrant " left breast. Patient to be scheduled for stereotactic biopsy of the breast in the radiology department. Risk, benefits and alternatives to this plan were thoroughly discussed with the patient who agrees to proceed.  The procedure was also thoroughly discussed as well as her follow-up. She is to see me in the office in one week but I also will call as soon as I see the pathology which is usually 4 working days from procedure.                  Jeannine Egan MA 04/11/25 9:20 AM

## 2025-04-11 ENCOUNTER — OFFICE VISIT (OUTPATIENT)
Dept: SURGERY | Facility: CLINIC | Age: 55
End: 2025-04-11
Payer: COMMERCIAL

## 2025-04-11 VITALS
TEMPERATURE: 97.5 F | HEART RATE: 69 BPM | DIASTOLIC BLOOD PRESSURE: 82 MMHG | BODY MASS INDEX: 24.59 KG/M2 | WEIGHT: 144 LBS | OXYGEN SATURATION: 99 % | SYSTOLIC BLOOD PRESSURE: 110 MMHG | RESPIRATION RATE: 17 BRPM | HEIGHT: 64 IN

## 2025-04-11 DIAGNOSIS — R92.0 MICROCALCIFICATION OF LEFT BREAST ON MAMMOGRAM: Primary | ICD-10-CM

## 2025-04-11 PROCEDURE — 99214 OFFICE O/P EST MOD 30 MIN: CPT | Performed by: SURGERY

## 2025-04-11 PROCEDURE — 3008F BODY MASS INDEX DOCD: CPT | Performed by: SURGERY

## 2025-04-11 PROCEDURE — 1036F TOBACCO NON-USER: CPT | Performed by: SURGERY

## 2025-04-11 ASSESSMENT — ENCOUNTER SYMPTOMS
LOSS OF SENSATION IN FEET: 0
OCCASIONAL FEELINGS OF UNSTEADINESS: 0
DEPRESSION: 0

## 2025-04-11 ASSESSMENT — LIFESTYLE VARIABLES
SKIP TO QUESTIONS 9-10: 1
HOW OFTEN DO YOU HAVE A DRINK CONTAINING ALCOHOL: NEVER
HOW OFTEN DO YOU HAVE SIX OR MORE DRINKS ON ONE OCCASION: NEVER
AUDIT-C TOTAL SCORE: 0
HOW MANY STANDARD DRINKS CONTAINING ALCOHOL DO YOU HAVE ON A TYPICAL DAY: PATIENT DOES NOT DRINK

## 2025-04-11 ASSESSMENT — PATIENT HEALTH QUESTIONNAIRE - PHQ9
SUM OF ALL RESPONSES TO PHQ9 QUESTIONS 1 & 2: 0
1. LITTLE INTEREST OR PLEASURE IN DOING THINGS: NOT AT ALL
2. FEELING DOWN, DEPRESSED OR HOPELESS: NOT AT ALL

## 2025-04-11 ASSESSMENT — PAIN SCALES - GENERAL: PAINLEVEL_OUTOF10: 0-NO PAIN

## 2025-04-11 ASSESSMENT — COLUMBIA-SUICIDE SEVERITY RATING SCALE - C-SSRS
6. HAVE YOU EVER DONE ANYTHING, STARTED TO DO ANYTHING, OR PREPARED TO DO ANYTHING TO END YOUR LIFE?: NO
2. HAVE YOU ACTUALLY HAD ANY THOUGHTS OF KILLING YOURSELF?: NO
1. IN THE PAST MONTH, HAVE YOU WISHED YOU WERE DEAD OR WISHED YOU COULD GO TO SLEEP AND NOT WAKE UP?: NO

## 2025-04-11 NOTE — ASSESSMENT & PLAN NOTE
With abnormal microcalcifications upper outer quadrant left breast. Patient to be scheduled for stereotactic biopsy of the breast in the radiology department. Risk, benefits and alternatives to this plan were thoroughly discussed with the patient who agrees to proceed.  The procedure was also thoroughly discussed as well as her follow-up. She is to see me in the office in one week but I also will call as soon as I see the pathology which is usually 4 working days from procedure.

## 2025-04-23 ENCOUNTER — HOSPITAL ENCOUNTER (OUTPATIENT)
Dept: RADIOLOGY | Facility: HOSPITAL | Age: 55
Discharge: HOME | End: 2025-04-23
Payer: COMMERCIAL

## 2025-04-23 DIAGNOSIS — R92.1 BREAST CALCIFICATION, LEFT: ICD-10-CM

## 2025-04-23 PROCEDURE — 19081 BX BREAST 1ST LESION STRTCTC: CPT | Mod: LT

## 2025-04-23 PROCEDURE — 19081 BX BREAST 1ST LESION STRTCTC: CPT | Mod: LEFT SIDE | Performed by: STUDENT IN AN ORGANIZED HEALTH CARE EDUCATION/TRAINING PROGRAM

## 2025-04-23 PROCEDURE — 2720000007 HC OR 272 NO HCPCS

## 2025-04-23 PROCEDURE — 77065 DX MAMMO INCL CAD UNI: CPT | Mod: LEFT SIDE | Performed by: STUDENT IN AN ORGANIZED HEALTH CARE EDUCATION/TRAINING PROGRAM

## 2025-04-23 PROCEDURE — 2500000004 HC RX 250 GENERAL PHARMACY W/ HCPCS (ALT 636 FOR OP/ED): Mod: JZ | Performed by: STUDENT IN AN ORGANIZED HEALTH CARE EDUCATION/TRAINING PROGRAM

## 2025-04-23 PROCEDURE — C1739 HC OR 278 NO HCPCS: HCPCS

## 2025-04-23 PROCEDURE — 2780000003 HC OR 278 NO HCPCS

## 2025-04-23 RX ADMIN — Medication 10 ML: at 09:02

## 2025-04-23 ASSESSMENT — PAIN SCALES - GENERAL
PAINLEVEL_OUTOF10: 0 - NO PAIN

## 2025-04-28 LAB
LABORATORY COMMENT REPORT: NORMAL
PATH REPORT.COMMENTS IMP SPEC: NORMAL
PATH REPORT.FINAL DX SPEC: NORMAL
PATH REPORT.GROSS SPEC: NORMAL
PATH REPORT.RELEVANT HX SPEC: NORMAL
PATH REPORT.TOTAL CANCER: NORMAL

## 2025-04-29 ENCOUNTER — TELEPHONE (OUTPATIENT)
Dept: SURGERY | Facility: CLINIC | Age: 55
End: 2025-04-29
Payer: COMMERCIAL

## 2025-04-29 NOTE — TELEPHONE ENCOUNTER
Called patient today to go over breast biopsy results per Dr. Lou. Let patient know no cancer was found. Patient verbalized understanding and will follow up in the office as planned.    Traci Fabian CMA

## 2025-04-29 NOTE — TELEPHONE ENCOUNTER
Maria Elena Lou MD  P [facility] [id number] [place] [id number] Clinical Support Staff  Call and tell her no cancer

## 2025-04-30 DIAGNOSIS — F41.8 DEPRESSION WITH ANXIETY: ICD-10-CM

## 2025-05-01 RX ORDER — ESCITALOPRAM OXALATE 20 MG/1
20 TABLET ORAL DAILY
Qty: 90 TABLET | Refills: 1 | Status: SHIPPED | OUTPATIENT
Start: 2025-05-01

## 2025-05-02 NOTE — PROGRESS NOTES
Subjective   Patient ID: Paulie Byrd is a 54 y.o. female who presents for discussion of stereotactic guided breast left localization and breast biopsy clip imaging.     HPI  Patient returns to the clinic to discuss the results from her stereotactic guided breast left localization and breast biopsy clip imaging on 04/23/2025.  Patient was last seen in the clinic on 4/11/2025 to discuss her biopsy.     FINAL DIAGNOSIS   A.  Left breast, core biopsy:    -- Atypical ductal hyperplasia (see comment)   -- Atypical lobular hyperplasia  -- Columnar cell change  -- Usual ductal hyperplasia  -- Micropapilloma  -- Microcysts  -- Microcalcifications associated with columnar cell change, microcysts, benign ducts and stroma     Citlaly Amaro MD, PhD             Comments:   Corrected result: Previously reported on 4/28/2025 at 1307 EDT.        Social History:  Tobacco Use - YES  Do you use any recreational drugs - NO  Alcohol Use - NO  Caffeine Intake - YES  Working - FULL TIME   Marital status -   Living with -  SPOUSE       Past Medical History:   Diagnosis Date    Anemia     Anxiety     Arthritis     Bilateral primary osteoarthritis of knee 01/19/2016    Degenerative arthritis of knee, bilateral    Chronic pain syndrome 06/06/2018    Pain syndrome, chronic    Chronic pancreatitis (Multi)     Colon polyp     Contusion of lower back and pelvis, subsequent encounter 09/07/2016    Lumbar contusion, subsequent encounter    Diabetes mellitus (Multi)     Encounter for other preprocedural examination 12/02/2016    Pre-op exam    Encounter for other preprocedural examination 01/19/2016    Preoperative evaluation to rule out surgical contraindication    Fibrocystic breast 2011    Graves disease     Other abnormality of red blood cells 06/23/2015    MCV - raised    Other tear of medial meniscus, current injury, right knee, subsequent encounter 01/19/2016    Acute medial meniscal tear, right, subsequent encounter    Pain in right  "knee 01/19/2016    Bilateral knee pain    Personal history of other diseases of the musculoskeletal system and connective tissue 11/11/2015    History of muscle pain    Personal history of other diseases of the musculoskeletal system and connective tissue 12/29/2014    History of low back pain    Personal history of other endocrine, nutritional and metabolic disease 06/06/2018    History of hyperglycemia    Personal history of other specified conditions 12/11/2014    History of lump of right breast    Personal history of other specified conditions 04/22/2015    History of fatigue    Unilateral primary osteoarthritis, left knee 12/02/2016    Degenerative joint disease of knee, left    Unilateral primary osteoarthritis, right knee 03/04/2016    Right knee DJD     Past Surgical History:   Procedure Laterality Date    ABDOMINAL SURGERY      ADENOIDECTOMY  06/27/2014    Adenoidectomy    BREAST BIOPSY Left 04/23/2025    CHOLECYSTECTOMY  1990    Cholecystectomy    HYSTERECTOMY  1997    Hysterectomy    JOINT REPLACEMENT      OTHER SURGICAL HISTORY  06/27/2014    Abdominal Surgery    OTHER SURGICAL HISTORY  09/23/2021    Knee replacement    OTHER SURGICAL HISTORY  09/23/2021    Cyst excision    SKIN BIOPSY       Family History   Problem Relation Name Age of Onset    Allergies Other      Colon polyps Father Vicente     Alcohol abuse Maternal Grandfather Maximiliano     Alcohol abuse Maternal Grandmother Vera     Colon polyps Maternal Grandmother Vera     Alcohol abuse Paternal Grandfather Lisandro     Alcohol abuse Son Noe     Alcohol abuse Mother's Sister Flory      Allergies   Allergen Reactions    Codeine GI Upset, Other and Nausea/vomiting    Grass Pollen Unknown       Review of Systems  /76 (BP Location: Right arm, Patient Position: Sitting, BP Cuff Size: Adult)   Pulse 70   Temp 36.4 °C (97.5 °F) (Temporal)   Resp 17   Ht 1.626 m (5' 4\")   Wt 65.3 kg (144 lb)   SpO2 98%   BMI 24.72 kg/m²     Objective   Physical " Exam  Left breast biopsy site healed nicely  Assessment/Plan   Problem List Items Addressed This Visit           ICD-10-CM    Atypical ductal hyperplasia of left breast - Primary N60.92    Patient status post core biopsy with pathology revealing atypical ductal and lobular hyperplasia.  At this point recommending Magseed localization lumpectomy.  Patient was informed there is up to 20% risk of increased upgrade to an early cancer in the area with lumpectomy.  Risk-benefit alternatives of doing a lumpectomy were thoroughly discussed with the patient agrees to proceed.  Patient also informed even with negative open biopsy she has 20% 10year risk of cancer do to atypical hyperplasia diagnosis and this can be reduced to 7.5% risk with hormone therapy.  She will see an oncologist for further discussion of hormone therapy and side effects.          Relevant Orders    Case Request Operating Room: Lumpectomy w/ Magseed Localization (Completed)        Breast History:  Patient is new to clinic and presents for Left stereo breast biopsy consult, bx on 4/23.  Patient is referred by Dr. Mukesh Yeung.  Patient had BI mammogram bilateral screening tomosynthesis completed on 3/14/2025. Impression was Right breast asymmetry and left breast calcifications. Diagnostic mammogram and possible ultrasound imaging is recommended.  Patient had BI mammogram bilateral diagnostic tomosynthesis and BI US breast limited left completed on 4/7/2025. Impression was Left breast: Suspicious left breast microcalcifications. Biopsy recommended. Stereotactic biopsy can be offered. Right breast: No abnormalities seen.   Denies any previous breast biopsies.  No palpable lesions in either breast.  No nipple discharge.     Patient does have a history of bone marrow biopsy.  They were unable to obtain adequate fluid.  This was for mild anemia.  Patient is also had colonoscopies for this reason.  With abnormal microcalcifications upper outer quadrant left  breast. Patient to be scheduled for stereotactic biopsy of the breast in the radiology department. Risk, benefits and alternatives to this plan were thoroughly discussed with the patient who agrees to proceed.  The procedure was also thoroughly discussed as well as her follow-up. She is to see me in the office in one week but I also will call as soon as I see the pathology which is usually 4 working days from procedure.   Previous Biopsy: NO Menarche Age: 9 Age of first delivery: 22 Breastfeed: YES Menopause age: PARTIAL HYSTERECTOMY AT 27 HRT: NO Family history of breast cancer: UNSURE Family history of ovarian cancer: NO Family history of pancreatic cancer: NO G 2 P 2     Jeannine Egan MA 05/08/25 11:18 AM    Yes

## 2025-05-07 LAB
CLINICAL SIG UPDATED INFO: NORMAL
LAB AP ASR DISCLAIMER: NORMAL
LABORATORY COMMENT REPORT: NORMAL
PATH REPORT.COMMENTS IMP SPEC: NORMAL
PATH REPORT.FINAL DX SPEC: NORMAL
PATH REPORT.GROSS SPEC: NORMAL
PATH REPORT.RELEVANT HX SPEC: NORMAL
PATH REPORT.TOTAL CANCER: NORMAL

## 2025-05-08 ENCOUNTER — OFFICE VISIT (OUTPATIENT)
Dept: SURGERY | Facility: CLINIC | Age: 55
End: 2025-05-08
Payer: COMMERCIAL

## 2025-05-08 VITALS
RESPIRATION RATE: 17 BRPM | SYSTOLIC BLOOD PRESSURE: 112 MMHG | WEIGHT: 144 LBS | BODY MASS INDEX: 24.59 KG/M2 | DIASTOLIC BLOOD PRESSURE: 76 MMHG | OXYGEN SATURATION: 98 % | HEIGHT: 64 IN | TEMPERATURE: 97.5 F | HEART RATE: 70 BPM

## 2025-05-08 DIAGNOSIS — N60.92 ATYPICAL DUCTAL HYPERPLASIA OF LEFT BREAST: Primary | ICD-10-CM

## 2025-05-08 PROCEDURE — 99214 OFFICE O/P EST MOD 30 MIN: CPT | Performed by: SURGERY

## 2025-05-08 PROCEDURE — 1036F TOBACCO NON-USER: CPT | Performed by: SURGERY

## 2025-05-08 PROCEDURE — 3008F BODY MASS INDEX DOCD: CPT | Performed by: SURGERY

## 2025-05-08 ASSESSMENT — PAIN SCALES - GENERAL: PAINLEVEL_OUTOF10: 0-NO PAIN

## 2025-05-08 ASSESSMENT — ENCOUNTER SYMPTOMS
OCCASIONAL FEELINGS OF UNSTEADINESS: 0
LOSS OF SENSATION IN FEET: 0
DEPRESSION: 0

## 2025-05-08 ASSESSMENT — LIFESTYLE VARIABLES
HOW OFTEN DO YOU HAVE A DRINK CONTAINING ALCOHOL: NEVER
AUDIT-C TOTAL SCORE: 0
HOW MANY STANDARD DRINKS CONTAINING ALCOHOL DO YOU HAVE ON A TYPICAL DAY: PATIENT DOES NOT DRINK
SKIP TO QUESTIONS 9-10: 1
HOW OFTEN DO YOU HAVE SIX OR MORE DRINKS ON ONE OCCASION: NEVER

## 2025-05-08 NOTE — PATIENT INSTRUCTIONS
Thank you for scheduling surgery with Dr. Lou   Below you will find your Surgery Itinerary to include dates, times, and locations for appointments involved with your procedure.      A representative from the hospital will contact you directly to schedule any Pre Admission Testing appointments needed.    On the day before the scheduled surgery, please call the Same Day Surgery department between 2-4 pm for a time of arrival for the day of procedure.  [facility] [phone]    Nothing to eat or drink after midnight the night before surgery    Surgery with Dr. Lou at [facility] - [address], [place] [address]  On: 7/14/2025    Postoperative appointment is scheduled at Surgery office locatoins: [facility] [address] [place], [place] [id number] [phone]                    On: 7/28/2025 at 3:15 PM    *Please note, you may receive a call from our financial counselors if you have a financial liability greater than $250.           [facility]  Pre - Operative Instructions     Your time of arrival for surgery is available the day before surgery. *If the surgery is on a Monday, or the Tuesday following a Monday Holiday, please call Same Day Surgery the Friday before your surgery date.*    DO NOT EAT OR DRINK ANYTHING AFTER MIDNIGHT THE NIGHT BEFORE SURGERY. This includes any beverages (coffee, water, soda, etc.), hard candy, gum or mints. If this is not followed, surgery may be canceled. Please avoid eating a large meal the evening before surgery. Please do not DRINK ALCOHOL or SMOKE FOR 24 HOURS before surgery.     Insulin Instructions - Please do not take any short acting Insulin (Regular or NPH). Do not take any oral diabetic medication on the day of surgery. Long acting Insulins may be taken (Lantus). We will check your blood sugar and administer at the hospital the day of surgery. Patient who have Insulin pumps are to make NO  adjustments.     Prescription Medications - You are encouraged to take prescription medications including heart, blood pressure, anti-seizure, anxiety, breathing medications (including inhalers) with exception to diabetic medications prior to arriving at the hospital the day of surgery. You may take prescribed pain medications as needed. Please remember the dose and time taken so we may inform your Anesthesiologist.     Please bring the name, dosage, and frequency of your medications if you did not provide these on the day of Pre Admission Testing. You may bring the actual bottles if this would be easier for you.     Please bring your prescribed inhalers with you the morning of surgery.     Patients on Anti-platelet and Anticoagulant agents, please read the following:  ASA, NSAIDS stop 5 days prior to surgery  Coumadin stop 5 days prior to surgery unless bridging therapy is needed (metal valve replacement, cardiac stent placement) - if so please speak to your Cardiologist or prescribing physician.   Other anti-platelet and anticoagulant agents:  Plavix (Clopidogrel) stop 5 days prior to surgery  Brilinta (Ticagrelor) stop 5 days prior to surgery   Effient (Prasugrel) stop 7 days prior to surgery   Lovenox (Enoxaparin) stop 24 hours prior to surgery  Arixtra (Fondaparinux) stop 5 days prior to surgery  Xarelto (Rivaroxaban) stop 3 days prior to surgery  Pradaxa (Dabigatran) stop 5 days prior to surgery  Eliquis (Apixaban) stop 3 days prior to surgery   Savaysa (Endoxaban) stop days prior to surgery     Patients on GLP-inhibitors  oral and injectable along with SGLT2 inhibitors please read the following:   SGLT2 inhibitors:        Ertugliflozin (Steglatro)- Stop a full 4 days before surgical/procedure date  Bexagliflozin (Brenzavvy)- Stop a full 3 days before surgical/procedure date  Canagliflozin (Invokana)- Stop a full 3 days before surgical/procedure date  Dapagliflozin (Farxiga)- Stop a full 3 days before  surgical/procedure date  Emagliflozin (Jardiance) - Stop a full 3 days before surgical/procedure date    GLP-1 Inhibitors oral:  Semaglutide (Rybelus)- Hold day of surgery only     GLP-1 Inhibitors Injection weekly:  Dulaglutide (Trulicity) -Stop a full 7 days before surgical/procedure date   Exenatide ER (Bydyreon, Bcise)-Stop a full 7 days before surgical/procedure date   Semiglutide (Ozempic, Wegovy)-Stop a full 7 days before surgical/procedure date     GLP-1 inhibitors injection twice a day:  Exenatide IR (Byetta)- Hold day of surgery only    GLP-1 inhibitors injection daily:  Liraglutide (Saxenda, Victoza)- Hold day of surgery only  Lixisenatide (Adlyxin) -Hold day of surgery only     Please stop all herbal medications 2 weeks prior to surgery     C-PAP Devices - If you have a C-PAP device at home, bring it with you on our day of surgery if your surgery requires you to stay overnight.     Please bring a copy of any Advanced Directives the day of surgery if you did not provide it at Pre Admission Testing. These documents are living ardon and durable power of  for healthcare.     Please notify your physician/surgeon if you develop a cold, sore throat, fever, flu symptoms, COVID symptoms or any changes in your physical conditions.     A shower or bath is preferred the evening before or the morning of surgery.     Remove jewelry before admission to the hospital. It is no longer permitted to tape rings. We ask that you leave all valuables at home. Any items of value will be given to a family member or locked up by security.   If you have a body piercing g that you cannot remove, it is recommended that you have it removed professionally and have a plastic spacer inserted. There is a risk for surgical burns with jewelry left in place.     Remove or wear minimal makeup the day of surgery. You will be asked to remove glasses and contact lenses prior to surgery. Please bring a glass case and/or contact lens case  with you. These items are not provided.     Dentures and partials are usually removed prior to surgery. A denture cup will be provided for you.       You may be asked to remove nail polish. Acrylic nails are now acceptable.     Wear loose comfortable clothing that you will be able to fit over bandages when you leave the hospitals (as appropriate for your surgery).    Patients that are under the age of 18 years must have a parent or guardian present the day of surgery.     Family members of significant others may stay with you on the Same Day Surgery unit. While you are in surgery, they may wait for you in the family waiting area. The physician will speak to the waiting family, if permitted by the patient, after surgery.     Changes or delays in the surgery schedule may occur due to emergencies. The hospital will notify you if this occurs. We apologize for any inconveniences this may present.     You must have a responsible  available to drive you home after surgery. You will not be permitted to drive yourself home after surgery if you have received any anesthesia or sedation during your procedure.     Please visit our website at [email or web address] for more information regarding [facility] services.      For questions about your Pre Admission Testing (PAT)  [facility] [phone]  [facility] [phone]    Thank you for choosing [facility]!

## 2025-05-08 NOTE — ASSESSMENT & PLAN NOTE
Patient status post core biopsy with pathology revealing atypical ductal and lobular hyperplasia.  At this point recommending Magseed localization lumpectomy.  Patient was informed there is up to 20% risk of increased upgrade to an early cancer in the area with lumpectomy.  Risk-benefit alternatives of doing a lumpectomy were thoroughly discussed with the patient agrees to proceed.  Patient also informed even with negative open biopsy she has 20% 10year risk of cancer do to atypical hyperplasia diagnosis and this can be reduced to 7.5% risk with hormone therapy.  She will see an oncologist for further discussion of hormone therapy and side effects.

## 2025-06-03 ENCOUNTER — HOSPITAL ENCOUNTER (EMERGENCY)
Facility: HOSPITAL | Age: 55
Discharge: HOME | End: 2025-06-03
Attending: EMERGENCY MEDICINE
Payer: COMMERCIAL

## 2025-06-03 ENCOUNTER — APPOINTMENT (OUTPATIENT)
Dept: RADIOLOGY | Facility: HOSPITAL | Age: 55
End: 2025-06-03
Payer: COMMERCIAL

## 2025-06-03 VITALS
TEMPERATURE: 98.2 F | BODY MASS INDEX: 26.16 KG/M2 | OXYGEN SATURATION: 98 % | HEIGHT: 64 IN | WEIGHT: 153.22 LBS | SYSTOLIC BLOOD PRESSURE: 130 MMHG | RESPIRATION RATE: 18 BRPM | HEART RATE: 65 BPM | DIASTOLIC BLOOD PRESSURE: 68 MMHG

## 2025-06-03 DIAGNOSIS — K52.9 COLITIS: ICD-10-CM

## 2025-06-03 DIAGNOSIS — K62.5 RECTAL BLEEDING: Primary | ICD-10-CM

## 2025-06-03 DIAGNOSIS — E87.6 HYPOKALEMIA: ICD-10-CM

## 2025-06-03 LAB
ALBUMIN SERPL BCP-MCNC: 4.3 G/DL (ref 3.4–5)
ALP SERPL-CCNC: 50 U/L (ref 33–110)
ALT SERPL W P-5'-P-CCNC: 28 U/L (ref 7–45)
ANION GAP SERPL CALCULATED.3IONS-SCNC: 12 MMOL/L (ref 10–20)
APPEARANCE UR: CLEAR
AST SERPL W P-5'-P-CCNC: 27 U/L (ref 9–39)
BASOPHILS # BLD AUTO: 0.04 X10*3/UL (ref 0–0.1)
BASOPHILS NFR BLD AUTO: 0.5 %
BILIRUB SERPL-MCNC: 0.2 MG/DL (ref 0–1.2)
BILIRUB UR STRIP.AUTO-MCNC: NEGATIVE MG/DL
BUN SERPL-MCNC: 12 MG/DL (ref 6–23)
CALCIUM SERPL-MCNC: 8.6 MG/DL (ref 8.6–10.3)
CHLORIDE SERPL-SCNC: 103 MMOL/L (ref 98–107)
CO2 SERPL-SCNC: 29 MMOL/L (ref 21–32)
COLOR UR: YELLOW
CREAT SERPL-MCNC: 0.81 MG/DL (ref 0.5–1.05)
EGFRCR SERPLBLD CKD-EPI 2021: 86 ML/MIN/1.73M*2
EOSINOPHIL # BLD AUTO: 0.2 X10*3/UL (ref 0–0.7)
EOSINOPHIL NFR BLD AUTO: 2.6 %
ERYTHROCYTE [DISTWIDTH] IN BLOOD BY AUTOMATED COUNT: 13.3 % (ref 11.5–14.5)
GLUCOSE SERPL-MCNC: 104 MG/DL (ref 74–99)
GLUCOSE UR STRIP.AUTO-MCNC: NORMAL MG/DL
HCT VFR BLD AUTO: 32.8 % (ref 36–46)
HGB BLD-MCNC: 11.3 G/DL (ref 12–16)
IMM GRANULOCYTES # BLD AUTO: 0.02 X10*3/UL (ref 0–0.7)
IMM GRANULOCYTES NFR BLD AUTO: 0.3 % (ref 0–0.9)
KETONES UR STRIP.AUTO-MCNC: NEGATIVE MG/DL
LEUKOCYTE ESTERASE UR QL STRIP.AUTO: ABNORMAL
LYMPHOCYTES # BLD AUTO: 3.17 X10*3/UL (ref 1.2–4.8)
LYMPHOCYTES NFR BLD AUTO: 41.3 %
MCH RBC QN AUTO: 32 PG (ref 26–34)
MCHC RBC AUTO-ENTMCNC: 34.5 G/DL (ref 32–36)
MCV RBC AUTO: 93 FL (ref 80–100)
MONOCYTES # BLD AUTO: 0.45 X10*3/UL (ref 0.1–1)
MONOCYTES NFR BLD AUTO: 5.9 %
NEUTROPHILS # BLD AUTO: 3.8 X10*3/UL (ref 1.2–7.7)
NEUTROPHILS NFR BLD AUTO: 49.4 %
NITRITE UR QL STRIP.AUTO: NEGATIVE
NRBC BLD-RTO: 0 /100 WBCS (ref 0–0)
PH UR STRIP.AUTO: 6 [PH]
PLATELET # BLD AUTO: 355 X10*3/UL (ref 150–450)
POTASSIUM SERPL-SCNC: 2.7 MMOL/L (ref 3.5–5.3)
PROT SERPL-MCNC: 6.6 G/DL (ref 6.4–8.2)
PROT UR STRIP.AUTO-MCNC: NEGATIVE MG/DL
RBC # BLD AUTO: 3.53 X10*6/UL (ref 4–5.2)
RBC # UR STRIP.AUTO: NEGATIVE MG/DL
RBC #/AREA URNS AUTO: NORMAL /HPF
SODIUM SERPL-SCNC: 141 MMOL/L (ref 136–145)
SP GR UR STRIP.AUTO: 1.02
SQUAMOUS #/AREA URNS AUTO: NORMAL /HPF
UROBILINOGEN UR STRIP.AUTO-MCNC: NORMAL MG/DL
WBC # BLD AUTO: 7.7 X10*3/UL (ref 4.4–11.3)
WBC #/AREA URNS AUTO: NORMAL /HPF

## 2025-06-03 PROCEDURE — 74174 CTA ABD&PLVS W/CONTRAST: CPT | Performed by: RADIOLOGY

## 2025-06-03 PROCEDURE — 80053 COMPREHEN METABOLIC PANEL: CPT | Performed by: EMERGENCY MEDICINE

## 2025-06-03 PROCEDURE — 87086 URINE CULTURE/COLONY COUNT: CPT | Mod: TRILAB | Performed by: EMERGENCY MEDICINE

## 2025-06-03 PROCEDURE — 2500000004 HC RX 250 GENERAL PHARMACY W/ HCPCS (ALT 636 FOR OP/ED)

## 2025-06-03 PROCEDURE — 96361 HYDRATE IV INFUSION ADD-ON: CPT

## 2025-06-03 PROCEDURE — 36415 COLL VENOUS BLD VENIPUNCTURE: CPT | Performed by: EMERGENCY MEDICINE

## 2025-06-03 PROCEDURE — 96365 THER/PROPH/DIAG IV INF INIT: CPT

## 2025-06-03 PROCEDURE — 99285 EMERGENCY DEPT VISIT HI MDM: CPT | Mod: 25 | Performed by: EMERGENCY MEDICINE

## 2025-06-03 PROCEDURE — 2550000001 HC RX 255 CONTRASTS: Performed by: EMERGENCY MEDICINE

## 2025-06-03 PROCEDURE — 85025 COMPLETE CBC W/AUTO DIFF WBC: CPT | Performed by: EMERGENCY MEDICINE

## 2025-06-03 PROCEDURE — 2500000002 HC RX 250 W HCPCS SELF ADMINISTERED DRUGS (ALT 637 FOR MEDICARE OP, ALT 636 FOR OP/ED)

## 2025-06-03 PROCEDURE — 96375 TX/PRO/DX INJ NEW DRUG ADDON: CPT

## 2025-06-03 PROCEDURE — 74174 CTA ABD&PLVS W/CONTRAST: CPT

## 2025-06-03 PROCEDURE — 81001 URINALYSIS AUTO W/SCOPE: CPT | Performed by: EMERGENCY MEDICINE

## 2025-06-03 PROCEDURE — 2500000004 HC RX 250 GENERAL PHARMACY W/ HCPCS (ALT 636 FOR OP/ED): Performed by: EMERGENCY MEDICINE

## 2025-06-03 PROCEDURE — 84075 ASSAY ALKALINE PHOSPHATASE: CPT | Performed by: EMERGENCY MEDICINE

## 2025-06-03 RX ORDER — POTASSIUM CHLORIDE 20 MEQ/1
40 TABLET, EXTENDED RELEASE ORAL ONCE
Status: COMPLETED | OUTPATIENT
Start: 2025-06-03 | End: 2025-06-03

## 2025-06-03 RX ORDER — POTASSIUM CHLORIDE 14.9 MG/ML
20 INJECTION INTRAVENOUS ONCE
Status: COMPLETED | OUTPATIENT
Start: 2025-06-03 | End: 2025-06-03

## 2025-06-03 RX ORDER — PANTOPRAZOLE SODIUM 40 MG/10ML
40 INJECTION, POWDER, LYOPHILIZED, FOR SOLUTION INTRAVENOUS ONCE
Status: COMPLETED | OUTPATIENT
Start: 2025-06-03 | End: 2025-06-03

## 2025-06-03 RX ADMIN — POTASSIUM CHLORIDE 40 MEQ: 1500 TABLET, EXTENDED RELEASE ORAL at 13:35

## 2025-06-03 RX ADMIN — SODIUM CHLORIDE 1000 ML: 900 INJECTION, SOLUTION INTRAVENOUS at 12:36

## 2025-06-03 RX ADMIN — PANTOPRAZOLE SODIUM 40 MG: 40 INJECTION, POWDER, LYOPHILIZED, FOR SOLUTION INTRAVENOUS at 12:36

## 2025-06-03 RX ADMIN — POTASSIUM CHLORIDE 20 MEQ: 14.9 INJECTION, SOLUTION INTRAVENOUS at 13:34

## 2025-06-03 RX ADMIN — IOHEXOL 75 ML: 350 INJECTION, SOLUTION INTRAVENOUS at 13:33

## 2025-06-03 ASSESSMENT — PAIN - FUNCTIONAL ASSESSMENT: PAIN_FUNCTIONAL_ASSESSMENT: 0-10

## 2025-06-03 ASSESSMENT — PAIN SCALES - GENERAL
PAINLEVEL_OUTOF10: 0 - NO PAIN
PAINLEVEL_OUTOF10: 2

## 2025-06-03 NOTE — DISCHARGE INSTRUCTIONS
You were seen in the ER with rectal bleeding and found to have colitis.  Your blood counts are stable at today's visit.  Your potassium was also found to be low and supplemented.  Please continue with oral supplementation of potassium at home and follow-up with your primary care provider for reassessment of your potassium level.    It is important to remember that your care does not end here and you must continue to monitor your condition closely. Please return to the emergency department for any worsening or concerning signs or symptoms as directed by our conversations and the discharge instructions. If you do not have a doctor please contact the referral number on your discharge instructions. Please contact any physician specialists provided in your discharge notes as it is very important to follow up with them regarding your condition. If you are unable to reach the physicians provided, please come back to the Emergency Department at any time.

## 2025-06-03 NOTE — ED PROVIDER NOTES
HPI   Chief Complaint   Patient presents with    Rectal Bleeding     Pt complains of rectal bleeding and diarrhea x3 starting today.  Bleeding has getting heavier.        HPI  Patient is a 54-year-old female presenting for evaluation of rectal bleeding that started this morning.  Patient states that she had bright red blood in the toilet while having 2 bowel movements this morning.  States that she has had a loose watery stool over the past 2 days.  Endorses some abdominal tenderness diffusely throughout her abdomen.  She endorses nausea but no vomiting.  States that she went to [facility] last week but otherwise no recent travel.  No recent surgeries or hospitalizations.  She does have history of a cholecystectomy and hysterectomy.  Denies chest pain, shortness of breath, lightheadedness or dizziness, fevers or chills.      Patient History   Medical History[1]  Surgical History[2]  Family History[3]  Social History[4]    Physical Exam   ED Triage Vitals [06/03/25 1203]   Temperature Heart Rate Respirations BP   36.8 °C (98.2 °F) 74 16 140/79      Pulse Ox Temp src Heart Rate Source Patient Position   98 % -- -- --      BP Location FiO2 (%)     -- --       Physical Exam  Vitals and nursing note reviewed.   Constitutional:       General: She is not in acute distress.     Appearance: She is well-developed.   HENT:      Head: Normocephalic and atraumatic.   Eyes:      Conjunctiva/sclera: Conjunctivae normal.   Cardiovascular:      Rate and Rhythm: Normal rate and regular rhythm.      Heart sounds: No murmur heard.  Pulmonary:      Effort: Pulmonary effort is normal. No respiratory distress.      Breath sounds: Normal breath sounds.   Abdominal:      Palpations: Abdomen is soft.      Comments: Soft, nontender nondistended   Musculoskeletal:         General: No swelling.      Cervical back: Neck supple.   Skin:     General: Skin is warm and dry.      Capillary Refill: Capillary refill takes less than 2 seconds.    Neurological:      Mental Status: She is alert.   Psychiatric:         Mood and Affect: Mood normal.           ED Course & MDM   Diagnoses as of 06/06/25 0821   Rectal bleeding   Colitis   Hypokalemia                 No data recorded     Violeta Coma Scale Score: 15 (06/03/25 1220 : Michelle Gamble, JANES)                           Medical Decision Making  Parts of this chart have been completed using voice recognition software. Please excuse any errors of transcription.  My thought process and reason for plan has been formulated from the time that I saw the patient until the time of disposition and is not specific to one specific moment during their visit and furthermore my MDM encompasses this entire chart and not only this text box.      HPI: Detailed above.    Exam: A medically appropriate exam performed, outlined above, given the known history and presentation.    History obtained from: Patient    EKG: Not warranted    Social Determinants of Health considered during this visit: Lives independently    Medications given during visit:  Medications   pantoprazole (Protonix) injection 40 mg (40 mg intravenous Given 6/3/25 1236)   sodium chloride 0.9 % bolus 1,000 mL (0 mL intravenous Stopped 6/3/25 1336)   potassium chloride CR (Klor-Con M20) ER tablet 40 mEq (40 mEq oral Given 6/3/25 1335)   potassium chloride 20 mEq in sterile water for injection 100 mL (0 mEq intravenous Stopped 6/3/25 1500)   iohexol (OMNIPaque) 350 mg iodine/mL solution 75 mL (75 mL intravenous Given 6/3/25 1333)        Diagnostic/tests  Labs Reviewed   CBC WITH AUTO DIFFERENTIAL - Abnormal       Result Value    WBC 7.7      nRBC 0.0      RBC 3.53 (*)     Hemoglobin 11.3 (*)     Hematocrit 32.8 (*)     MCV 93      MCH 32.0      MCHC 34.5      RDW 13.3      Platelets 355      Neutrophils % 49.4      Immature Granulocytes %, Automated 0.3      Lymphocytes % 41.3      Monocytes % 5.9      Eosinophils % 2.6      Basophils % 0.5      Neutrophils  Absolute 3.80      Immature Granulocytes Absolute, Automated 0.02      Lymphocytes Absolute 3.17      Monocytes Absolute 0.45      Eosinophils Absolute 0.20      Basophils Absolute 0.04     COMPREHENSIVE METABOLIC PANEL - Abnormal    Glucose 104 (*)     Sodium 141      Potassium 2.7 (*)     Chloride 103      Bicarbonate 29      Anion Gap 12      Urea Nitrogen 12      Creatinine 0.81      eGFR 86      Calcium 8.6      Albumin 4.3      Alkaline Phosphatase 50      Total Protein 6.6      AST 27      Bilirubin, Total 0.2      ALT 28     URINALYSIS WITH REFLEX CULTURE AND MICROSCOPIC - Abnormal    Color, Urine Yellow      Appearance, Urine Clear      Specific Gravity, Urine 1.025      pH, Urine 6.0      Protein, Urine NEGATIVE      Glucose, Urine Normal      Blood, Urine NEGATIVE      Ketones, Urine NEGATIVE      Bilirubin, Urine NEGATIVE      Urobilinogen, Urine Normal      Nitrite, Urine NEGATIVE      Leukocyte Esterase, Urine 75 Shantanu/uL (*)    URINE CULTURE - Normal    Urine Culture        Value: Clinically insignificant growth based on current clinical standards.   MICROSCOPIC ONLY, URINE    WBC, Urine 1-5      RBC, Urine 1-2      Squamous Epithelial Cells, Urine 1-9 (SPARSE)     URINALYSIS WITH REFLEX CULTURE AND MICROSCOPIC    Narrative:     The following orders were created for panel order Urinalysis with Reflex Culture and Microscopic.  Procedure                               Abnormality         Status                     ---------                               -----------         ------                     Urinalysis with Reflex C...[[id number]]  Abnormal            Final result               Extra Urine Gray Tube[[id number]]                                                         Please view results for these tests on the individual orders.   EXTRA URINE GRAY TUBE      CT angio abdomen pelvis w and or wo IV IV contrast   Final Result   No evidence of active GI bleed.        Apparent multifocal colitis involving  the abdominal and pelvic colon.        MACRO:   None        Signed by: Charlie Patel 6/3/2025 1:48 PM   Dictation workstation:   [id number]           Considerations/further MDM:  Patient is a 54-year-old female presenting for evaluation of rectal bleeding    I saw this patient in conjunction with Dr. Jack    Patient awake alert nontoxic-appearing.  Abdomen soft and nontender on exam.  She is hemodynamically stable with no active hemorrhaging during the visit.  Laboratory workup with hypokalemia which was supplemented during the visit.  Also with evidence of anemia which appears stable compared to patient's recent laboratory workup.  CT imaging is without evidence of GI bleed but is suggestive of colitis which is consistent with patient's symptomatology.  CT otherwise not suggestive of diverticulitis, bowel obstruction or perforation.  Her symptoms are likely felt to be viral provided she is afebrile with watery diarrhea no mucus in the stool.  Do feel her symptoms can be treated symptomatically outpatient.  Patient is agreeable with this plan of care.  She is advised to follow-up with her PCP and gastroenterology regarding her symptoms.  Advised to follow-up regarding potassium levels and continue with outpatient supplementation of her potassium.  I discussed the laboratory and imaging findings with the patient at bedside. Patient's questions and concerns were addressed. Patient was released in good condition, discharged with instructions to follow up with primary care provider and appropriate specialist, and to return to ED at any time for worsening symptoms or any other concerns. Patient demonstrates understanding of the findings and the importance of appropriate follow up care.         Procedure  Procedures       Francine Gonzalez PA-C  06/03/25 2233         [1]   Past Medical History:  Diagnosis Date    Anemia     Anxiety     Arthritis     Bilateral primary osteoarthritis of knee 01/19/2016    Degenerative  arthritis of knee, bilateral    Chronic pain syndrome 06/06/2018    Pain syndrome, chronic    Chronic pancreatitis (Multi)     Colon polyp     Contusion of lower back and pelvis, subsequent encounter 09/07/2016    Lumbar contusion, subsequent encounter    Diabetes mellitus (Multi)     Encounter for other preprocedural examination 12/02/2016    Pre-op exam    Encounter for other preprocedural examination 01/19/2016    Preoperative evaluation to rule out surgical contraindication    Fibrocystic breast 2011    Graves disease     Other abnormality of red blood cells 06/23/2015    MCV - raised    Other tear of medial meniscus, current injury, right knee, subsequent encounter 01/19/2016    Acute medial meniscal tear, right, subsequent encounter    Pain in right knee 01/19/2016    Bilateral knee pain    Personal history of other diseases of the musculoskeletal system and connective tissue 11/11/2015    History of muscle pain    Personal history of other diseases of the musculoskeletal system and connective tissue 12/29/2014    History of low back pain    Personal history of other endocrine, nutritional and metabolic disease 06/06/2018    History of hyperglycemia    Personal history of other specified conditions 12/11/2014    History of lump of right breast    Personal history of other specified conditions 04/22/2015    History of fatigue    Unilateral primary osteoarthritis, left knee 12/02/2016    Degenerative joint disease of knee, left    Unilateral primary osteoarthritis, right knee 03/04/2016    Right knee DJD   [2]   Past Surgical History:  Procedure Laterality Date    ABDOMINAL SURGERY      ADENOIDECTOMY  06/27/2014    Adenoidectomy    BREAST BIOPSY Left 04/23/2025    CHOLECYSTECTOMY  1990    Cholecystectomy    HYSTERECTOMY  1997    Hysterectomy    JOINT REPLACEMENT      OTHER SURGICAL HISTORY  06/27/2014    Abdominal Surgery    OTHER SURGICAL HISTORY  09/23/2021    Knee replacement    OTHER SURGICAL HISTORY   09/23/2021    Cyst excision    SKIN BIOPSY     [3]   Family History  Problem Relation Name Age of Onset    Allergies Other      Colon polyps Father Vicente     Alcohol abuse Maternal Grandfather Maximiliano     Alcohol abuse Maternal Grandmother Vera     Colon polyps Maternal Grandmother Vera     Alcohol abuse Paternal Grandfather Lisandro     Alcohol abuse Son Noe     Alcohol abuse Mother's Sister Flory    [4]   Social History  Tobacco Use    Smoking status: Former     Passive exposure: Past    Smokeless tobacco: Never   Vaping Use    Vaping status: Every Day    Substances: Nicotine    Passive vaping exposure: Yes   Substance Use Topics    Alcohol use: Not Currently     Comment: sober since 2006    Drug use: Not Currently        Francine Gonzalez PA-C  06/06/25 0815

## 2025-06-03 NOTE — PROGRESS NOTES
Attestation/Supervisory note for RICARDO Gonzalez      The patient is a 54-year-old female presenting to the emergency department for evaluation of rectal bleeding.  She states that started earlier this morning.  He also reports having loose stools.  She states that she does have a history of similar symptoms but she does not currently receive care from a gastroenterologist.  She denies any headache or visual changes.  No chest pain or shortness of breath.  No abdominal pain.  No nausea or vomiting.  No urinary complaints.  No vaginal discharge.  No fever or chills.  No use of any blood thinners.  All pertinent positives and negatives are recorded above.  All other systems reviewed and otherwise negative.  Vital signs within normal limits.  Physical exam with a well-nourished well-developed female in no acute distress.  HEENT exam within normal limits.  She does not have any evidence of airway compromise or respiratory distress.  Abdominal exam is benign.  She does not have any gross motor, neurologic or vascular deficits on exam.  Pulses are equal bilaterally.  She is able to walk and stand without difficulty.  She is able to converse without difficulty.      Protonix and IV fluids ordered.      Diagnostic labs with mild anemia and electrolyte imbalance with hypokalemia but otherwise unremarkable      CT angio abdomen pelvis w and or wo IV IV contrast   Final Result   No evidence of active GI bleed.        Apparent multifocal colitis involving the abdominal and pelvic colon.        MACRO:   None        Signed by: Charlie Patel 6/3/2025 1:48 PM   Dictation workstation:   [id number]           The patient does not have any evidence of airway compromise or respiratory distress on exam.  She has no evidence of hemodynamic instability.  She is well-perfused on exam.  No evidence of sepsis.  She has a benign abdominal exam.  She has some mild anemia and electrolyte balance with hypokalemia diagnostic labs and IV and oral  potassium chloride were ordered.  CT abdomen pelvis shows evidence of colitis but otherwise is unremarkable.  No evidence of appendicitis, diverticulitis, bowel obstruction, cholecystitis or pancreatitis.        The patient was released in good condition.  She will follow-up with her primary care physician within 1 to 2 days for further management of her current symptoms.  She was also given a referral to gastroenterology for further management of her rectal bleeding.  She will return to the emergency department sooner with worsening symptoms or onset of new symptoms        Impression/diagnosis:  Rectal bleeding  Electrolyte imbalance with hypokalemia  Colitis      Critical care time of  14 minutes billed for management of rectal bleeding and an electrolyte imbalance with hypokalemia with monitoring of the patient on telemetry, initiation of oral and IV potassium chloride, consultation with the patient regarding her results.  This time excludes time for billable procedures.      critical care time billed for by me is non concurrent with time billed for by RICARDO Gonzalez      I personally saw the patient and made/approve the management plan and take responsibility for the patient management.      I independently interpreted the following study (S) diagnostic labs      I personally discussed the patient's management with the patient      I reviewed the results of the diagnostic labs and diagnostic imaging.  Formal radiology read was completed by the radiologist.      Rosario Jack MD

## 2025-06-03 NOTE — ED PROCEDURE NOTE
Procedure  Critical Care    Performed by: Rosario Jack MD  Authorized by: Rosario Jack MD    Critical care provider statement:     Critical care time (minutes):  14    Critical care time was exclusive of:  Teaching time and separately billable procedures and treating other patients    Critical care was necessary to treat or prevent imminent or life-threatening deterioration of the following conditions: Hypokalemia and rectal bleeding.    Critical care was time spent personally by me on the following activities:  Obtaining history from patient or surrogate, examination of patient, evaluation of patient's response to treatment, development of treatment plan with patient or surrogate, blood draw for specimens, ordering and performing treatments and interventions, ordering and review of laboratory studies, ordering and review of radiographic studies, pulse oximetry and re-evaluation of patient's condition  Comments:      Critical care time of  14 minutes billed for management of rectal bleeding and an electrolyte imbalance with hypokalemia with monitoring of the patient on telemetry, initiation of oral and IV potassium chloride, consultation with the patient regarding her results.  This time excludes time for billable procedures.      critical care time billed for by me is non concurrent with time billed for by RICARDO Jack MD  06/03/25 4647

## 2025-06-04 ENCOUNTER — HOSPITAL ENCOUNTER (OUTPATIENT)
Dept: RADIOLOGY | Facility: HOSPITAL | Age: 55
Discharge: HOME | End: 2025-06-04
Payer: COMMERCIAL

## 2025-06-04 DIAGNOSIS — R92.1 CALCIFICATION OF LEFT BREAST: ICD-10-CM

## 2025-06-04 DIAGNOSIS — E87.6 HYPOKALEMIA: ICD-10-CM

## 2025-06-04 PROCEDURE — 19281 PERQ DEVICE BREAST 1ST IMAG: CPT | Mod: LT

## 2025-06-04 PROCEDURE — 2500000004 HC RX 250 GENERAL PHARMACY W/ HCPCS (ALT 636 FOR OP/ED): Performed by: STUDENT IN AN ORGANIZED HEALTH CARE EDUCATION/TRAINING PROGRAM

## 2025-06-04 PROCEDURE — 2780000003 HC OR 278 NO HCPCS

## 2025-06-04 PROCEDURE — C1739 HC OR 278 NO HCPCS: HCPCS

## 2025-06-04 RX ORDER — POTASSIUM CHLORIDE 20 MEQ/1
20 TABLET, EXTENDED RELEASE ORAL DAILY
Qty: 90 TABLET | Refills: 3 | Status: SHIPPED | OUTPATIENT
Start: 2025-06-04 | End: 2025-06-12 | Stop reason: SDUPTHER

## 2025-06-04 RX ADMIN — Medication 5 ML: at 08:48

## 2025-06-04 ASSESSMENT — PAIN SCALES - GENERAL
PAINLEVEL_OUTOF10: 0 - NO PAIN
PAINLEVEL_OUTOF10: 0 - NO PAIN

## 2025-06-05 LAB — BACTERIA UR CULT: NORMAL

## 2025-06-11 LAB
ANION GAP SERPL CALCULATED.4IONS-SCNC: 11 MMOL/L (CALC) (ref 7–17)
BASOPHILS # BLD AUTO: 42 CELLS/UL (ref 0–200)
BASOPHILS NFR BLD AUTO: 0.6 %
BUN SERPL-MCNC: 9 MG/DL (ref 7–25)
BUN/CREAT SERPL: ABNORMAL (CALC) (ref 6–22)
CALCIUM SERPL-MCNC: 9.1 MG/DL (ref 8.6–10.4)
CHLORIDE SERPL-SCNC: 103 MMOL/L (ref 98–110)
CHOLEST SERPL-MCNC: 268 MG/DL
CHOLEST/HDLC SERPL: 3.7 (CALC)
CO2 SERPL-SCNC: 27 MMOL/L (ref 20–32)
CREAT SERPL-MCNC: 0.88 MG/DL (ref 0.5–1.03)
EGFRCR SERPLBLD CKD-EPI 2021: 78 ML/MIN/1.73M2
EOSINOPHIL # BLD AUTO: 238 CELLS/UL (ref 15–500)
EOSINOPHIL NFR BLD AUTO: 3.4 %
ERYTHROCYTE [DISTWIDTH] IN BLOOD BY AUTOMATED COUNT: 12.7 % (ref 11–15)
GLUCOSE SERPL-MCNC: 107 MG/DL (ref 65–99)
HCT VFR BLD AUTO: 36.6 % (ref 35–45)
HDLC SERPL-MCNC: 72 MG/DL
HGB BLD-MCNC: 12.2 G/DL (ref 11.7–15.5)
LDLC SERPL CALC-MCNC: 165 MG/DL (CALC)
LYMPHOCYTES # BLD AUTO: 2821 CELLS/UL (ref 850–3900)
LYMPHOCYTES NFR BLD AUTO: 40.3 %
MCH RBC QN AUTO: 32.8 PG (ref 27–33)
MCHC RBC AUTO-ENTMCNC: 33.3 G/DL (ref 32–36)
MCV RBC AUTO: 98.4 FL (ref 80–100)
MONOCYTES # BLD AUTO: 504 CELLS/UL (ref 200–950)
MONOCYTES NFR BLD AUTO: 7.2 %
NEUTROPHILS # BLD AUTO: 3395 CELLS/UL (ref 1500–7800)
NEUTROPHILS NFR BLD AUTO: 48.5 %
NONHDLC SERPL-MCNC: 196 MG/DL (CALC)
PLATELET # BLD AUTO: 423 THOUSAND/UL (ref 140–400)
PMV BLD REES-ECKER: 8.6 FL (ref 7.5–12.5)
POTASSIUM SERPL-SCNC: 3.1 MMOL/L (ref 3.5–5.3)
RBC # BLD AUTO: 3.72 MILLION/UL (ref 3.8–5.1)
SODIUM SERPL-SCNC: 141 MMOL/L (ref 135–146)
TRIGL SERPL-MCNC: 161 MG/DL
TSH SERPL-ACNC: 4.79 MIU/L
WBC # BLD AUTO: 7 THOUSAND/UL (ref 3.8–10.8)

## 2025-06-12 DIAGNOSIS — E78.5 HYPERLIPIDEMIA, UNSPECIFIED HYPERLIPIDEMIA TYPE: Primary | ICD-10-CM

## 2025-06-12 DIAGNOSIS — E03.8 SECONDARY HYPOTHYROIDISM: ICD-10-CM

## 2025-06-12 DIAGNOSIS — E87.6 HYPOKALEMIA: ICD-10-CM

## 2025-06-12 RX ORDER — POTASSIUM CHLORIDE 20 MEQ/1
20 TABLET, EXTENDED RELEASE ORAL 2 TIMES DAILY
Qty: 180 TABLET | Refills: 3 | Status: SHIPPED | OUTPATIENT
Start: 2025-06-12

## 2025-06-12 RX ORDER — LEVOTHYROXINE SODIUM 88 UG/1
88 TABLET ORAL DAILY
Qty: 90 TABLET | Refills: 1 | Status: SHIPPED | OUTPATIENT
Start: 2025-06-12 | End: 2026-06-12

## 2025-06-12 RX ORDER — ROSUVASTATIN CALCIUM 10 MG/1
10 TABLET, COATED ORAL DAILY
Qty: 100 TABLET | Refills: 3 | Status: SHIPPED | OUTPATIENT
Start: 2025-06-12 | End: 2026-07-17

## 2025-06-30 ENCOUNTER — PRE-ADMISSION TESTING (OUTPATIENT)
Dept: PREADMISSION TESTING | Facility: HOSPITAL | Age: 55
End: 2025-06-30
Payer: COMMERCIAL

## 2025-06-30 VITALS
OXYGEN SATURATION: 100 % | WEIGHT: 150 LBS | HEART RATE: 74 BPM | HEIGHT: 65 IN | SYSTOLIC BLOOD PRESSURE: 117 MMHG | TEMPERATURE: 97.3 F | BODY MASS INDEX: 24.99 KG/M2 | RESPIRATION RATE: 16 BRPM | DIASTOLIC BLOOD PRESSURE: 85 MMHG

## 2025-06-30 DIAGNOSIS — Z01.818 PRE-OP TESTING: Primary | ICD-10-CM

## 2025-06-30 LAB
ANION GAP SERPL CALCULATED.3IONS-SCNC: 13 MMOL/L (ref 10–20)
BUN SERPL-MCNC: 15 MG/DL (ref 6–23)
CALCIUM SERPL-MCNC: 9.2 MG/DL (ref 8.6–10.3)
CHLORIDE SERPL-SCNC: 101 MMOL/L (ref 98–107)
CO2 SERPL-SCNC: 29 MMOL/L (ref 21–32)
CREAT SERPL-MCNC: 1.02 MG/DL (ref 0.5–1.05)
EGFRCR SERPLBLD CKD-EPI 2021: 65 ML/MIN/1.73M*2
GLUCOSE SERPL-MCNC: 88 MG/DL (ref 74–99)
POTASSIUM SERPL-SCNC: 3.8 MMOL/L (ref 3.5–5.3)
SODIUM SERPL-SCNC: 139 MMOL/L (ref 136–145)

## 2025-06-30 PROCEDURE — 93005 ELECTROCARDIOGRAM TRACING: CPT

## 2025-06-30 PROCEDURE — 36415 COLL VENOUS BLD VENIPUNCTURE: CPT

## 2025-06-30 PROCEDURE — 93010 ELECTROCARDIOGRAM REPORT: CPT | Performed by: INTERNAL MEDICINE

## 2025-06-30 PROCEDURE — 99204 OFFICE O/P NEW MOD 45 MIN: CPT | Performed by: PHYSICIAN ASSISTANT

## 2025-06-30 PROCEDURE — 80048 BASIC METABOLIC PNL TOTAL CA: CPT

## 2025-06-30 ASSESSMENT — ENCOUNTER SYMPTOMS
EYES NEGATIVE: 1
NEUROLOGICAL NEGATIVE: 1
CARDIOVASCULAR NEGATIVE: 1
ARTHRALGIAS: 1
DIARRHEA: 1
NERVOUS/ANXIOUS: 1
CONSTITUTIONAL NEGATIVE: 1
RESPIRATORY NEGATIVE: 1

## 2025-06-30 ASSESSMENT — DUKE ACTIVITY SCORE INDEX (DASI)
CAN YOU DO YARD WORK LIKE RAKING LEAVES, WEEDING OR PUSHING A MOWER: YES
CAN YOU PARTICIPATE IN MODERATE RECREATIONAL ACTIVITIES LIKE GOLF, BOWLING, DANCING, DOUBLES TENNIS OR THROWING A BASEBALL OR FOOTBALL: NO
CAN YOU DO HEAVY WORK AROUND THE HOUSE LIKE SCRUBBING FLOORS OR LIFTING AND MOVING HEAVY FURNITURE: YES
TOTAL_SCORE: 36.7
CAN YOU DO LIGHT WORK AROUND THE HOUSE LIKE DUSTING OR WASHING DISHES: YES
CAN YOU CLIMB A FLIGHT OF STAIRS OR WALK UP A HILL: YES
CAN YOU WALK INDOORS, SUCH AS AROUND YOUR HOUSE: YES
CAN YOU RUN A SHORT DISTANCE: NO
DASI METS SCORE: 7.3
CAN YOU PARTICIPATE IN STRENOUS SPORTS LIKE SWIMMING, SINGLES TENNIS, FOOTBALL, BASKETBALL, OR SKIING: NO
CAN YOU HAVE SEXUAL RELATIONS: YES
CAN YOU DO MODERATE WORK AROUND THE HOUSE LIKE VACUUMING, SWEEPING FLOORS OR CARRYING GROCERIES: YES
CAN YOU WALK A BLOCK OR TWO ON LEVEL GROUND: YES
CAN YOU TAKE CARE OF YOURSELF (EAT, DRESS, BATHE, OR USE TOILET): YES

## 2025-06-30 NOTE — PREPROCEDURE INSTRUCTIONS
PAT DISCHARGE INSTRUCTIONS    Please call the Same Day Surgery (SDS) Department of the hospital where your procedure will be performed after 2:00 PM the day before your surgery. If you are scheduled on a Monday, or a Tuesday following a Monday holiday, you will need to call on the last business day prior to your surgery.    [place] [id number] [place] [id number]  [phone]  Second Floor          Please let your surgeon know if:      You develop any open sores, shingles, burning or painful urination as these may increase your risk of an infection.   You no longer wish to have the surgery.   Any other personal circumstances change that may lead to the need to cancel or defer this surgery-such as being sick or getting admitted to any hospital within one week of your planned procedure.    Your contact details change, such as a change of address or phone number.    Starting now:     Please DO NOT drink alcohol or smoke for 24 hours before surgery. It is well known that quitting smoking can make a huge difference to your health and recovery from surgery. The longer you abstain from smoking, the better your chances of a healthy recovery. If you need help with quitting, call 1-800-QUIT-NOW to be connected to a trained counselor who will discuss the best methods to help you quit.     Before your surgery:    Please stop all supplements 7 days prior to surgery. Or as directed by your surgeon.   Please stop taking NSAID pain medicine such as Advil and Motrin 7 days before surgery.    If you develop any fever, cough, cold, rashes, cuts, scratches, scrapes, urinary symptoms or infection anywhere on your body (including teeth and gums) prior to surgery, please call your surgeon’s office as soon as possible. This may require treatment to reduce the chance of cancellation on the day of surgery.    The day before your surgery:   DIET- Please follow the diet instructions at the top of your  packet.   Get a good night’s rest.  Use the special soap for bathing if you have been instructed to use one.    Scheduled surgery times may change and you will be notified if this occurs - please check your personal voicemail for any updates.     On the morning of surgery:   Wear comfortable, loose fitting clothes which open in the front. Please do not wear moisturizers, creams, lotions, makeup or perfume.    Please bring with you to surgery:   Photo ID and insurance card   Current list of medicines and allergies   Pacemaker/ Defibrillator/Heart stent cards   CPAP machine and mask    Slings/ splints/ crutches   A copy of your complete advanced directive/DHPOA.    Please do NOT bring with you to surgery:   All jewelry and valuables should be left at home.   Prosthetic devices such as contact lenses, hearing aids, dentures, eyelash extensions, hairpins and body piercings must be removed prior to going in to the surgical suite.    After outpatient surgery:   A responsible adult MUST accompany you at the time of discharge and stay with you for 24 hours after your surgery. You may NOT drive yourself home after surgery.    Do not drive, operate machinery, make critical decisions or do activities that require co-ordination or balance until after a night’s sleep.   Do not drink alcoholic beverages for 24 hours.   Instructions for resuming your medications will be provided by your surgeon.    CALL YOUR DOCTOR AFTER SURGERY IF YOU HAVE:     Chills and/or a fever of 101° F or higher.    Redness, swelling, pus or drainage from your surgical wound or a bad smell from the wound.    Lightheadedness, fainting or confusion.    Persistent vomiting (throwing up) and are not able to eat or drink for 12 hours.    Three or more loose, watery bowel movements in 24 hours (diarrhea).   Difficulty or pain while urinating( after non-urological surgery)    Pain and swelling in your legs, especially if it is only on one side.    Difficulty  breathing or are breathing faster than normal.    Any new concerning symptoms.            Why must I stop eating and drinking near surgery time?  With sedation, food or liquid in your stomach can enter your lungs causing serious complications  Increases nausea and vomiting    When do I need to stop eating and drinking before my surgery?   Do not eat or drink after midnight the night before your surgery/procedure.  You may have small sips of water to take your medication.     Medication List            Accurate as of June 30, 2025  2:37 PM. Always use your most recent med list.                benzoyl peroxide 5 % external wash  Commonly known as: Advanced Exfoliating Cleanser  Apply to affected areas of skin as a wash, let set for 2 minutes, rinse thoroughly. Use daily in AM  Medication Adjustments for Surgery: Take/Use as prescribed     clindamycin 1 % external solution  Commonly known as: Cleocin T  Apply to affected areas twice daily.  Medication Adjustments for Surgery: Take/Use as prescribed     escitalopram 20 mg tablet  Commonly known as: Lexapro  Take 1 tablet by mouth once daily.  Medication Adjustments for Surgery: Take on the morning of surgery     gabapentin 300 mg capsule  Commonly known as: Neurontin  Take one capsule every 8 hours and and 1 extra capsule capsule at bedtime  Medication Adjustments for Surgery: Take/Use as prescribed     IRON 100 PLUS ORAL  Additional Medication Adjustments for Surgery: Take last dose 7 days before surgery     levothyroxine 88 mcg tablet  Commonly known as: Synthroid, Levoxyl  Take 1 tablet (88 mcg) by mouth early in the morning.. Take on an empty stomach at the same time each day, either 30 to 60 minutes prior to breakfast  Medication Adjustments for Surgery: Take on the morning of surgery     omeprazole 40 mg DR capsule  Commonly known as: PriLOSEC  Take 1 capsule by mouth once daily in the morning before a meal. Do not crush or chew.  Medication Adjustments for  Surgery: Take on the morning of surgery     ondansetron ODT 8 mg disintegrating tablet  Commonly known as: Zofran-ODT  Dissolve 1 tablet (8 mg) in the mouth every 8 hours if needed for nausea or vomiting.  Medication Adjustments for Surgery: Take/Use as prescribed     potassium chloride CR 20 mEq ER tablet  Commonly known as: Klor-Con M20  Take 1 tablet (20 mEq) by mouth 2 times a day. Do not crush or chew.  Medication Adjustments for Surgery: Take on the morning of surgery     propranolol 20 mg tablet  Commonly known as: Inderal  Take 1 tablet (20 mg) by mouth 3 times a day as needed (for anxiety).  Medication Adjustments for Surgery: Take on the morning of surgery     rosuvastatin 10 mg tablet  Commonly known as: Crestor  Take 1 tablet (10 mg) by mouth once daily.  Medication Adjustments for Surgery: Take on the morning of surgery

## 2025-06-30 NOTE — H&P (VIEW-ONLY)
"CPM/PAT Evaluation       Name: Paulie Byrd (Paluie Byrd)  /Age: 1970/54 y.o.     In-Person       Chief Complaint: \"having breast surgery\"    HPI  The patient is a 54 year old female.  She states she has a routine mammogram every year.  Her mammogram in  showed an abnormality in the left breast.  She denies breast pain, swelling, nipple discharge or skin changes.  She also denies a family history of breast cancer.  She underwent a left breast biopsy on 2025 that showed atypical ductal hyperplasia.  She was referred to Dr. Lou and surgical intervention is recommended.    Past Medical History:   Diagnosis Date    Anemia     Anxiety     Bilateral primary osteoarthritis of knee 2016    Degenerative arthritis of knee, bilateral    Bipolar 1 disorder (Multi)     Chronic pain syndrome 2018    Pain syndrome, chronic    Chronic pancreatitis (Multi)     Colon polyp     Contusion of lower back and pelvis, subsequent encounter 2016    Lumbar contusion, subsequent encounter    COPD (chronic obstructive pulmonary disease) (Multi)     Depression     Fibrocystic breast     GERD (gastroesophageal reflux disease)     Graves disease     Hyperlipidemia     Hypothyroidism     Kidney stone     Other tear of medial meniscus, current injury, right knee, subsequent encounter 2016    Acute medial meniscal tear, right, subsequent encounter    Personal history of other diseases of the musculoskeletal system and connective tissue 2015    History of muscle pain    Personal history of other diseases of the musculoskeletal system and connective tissue 2014    History of low back pain    Personal history of other specified conditions 2014    History of lump of right breast    Prediabetes     RLS (restless legs syndrome)        Past Surgical History:   Procedure Laterality Date    ADENOIDECTOMY      BREAST BIOPSY Left 2025    CHOLECYSTECTOMY      " HYSTERECTOMY      KNEE ARTHROPLASTY Bilateral     OTHER SURGICAL HISTORY      Cyst excision    SKIN BIOPSY       Family History   Problem Relation Name Age of Onset    Allergies Other      Colon polyps Father Vicente     Alcohol abuse Maternal Grandfather Maximiliano     Alcohol abuse Maternal Grandmother Vera     Colon polyps Maternal Grandmother Vera     Alcohol abuse Paternal Grandfather Lisandro     Alcohol abuse Son Noe     Alcohol abuse Mother's Sister Flory      Social History     Tobacco Use    Smoking status: Former     Current packs/day: 0.00     Average packs/day: 1 pack/day for 33.0 years (33.0 ttl pk-yrs)     Types: Cigarettes     Start date:      Quit date:      Years since quittin.4     Passive exposure: Past    Smokeless tobacco: Never   Substance Use Topics    Alcohol use: Not Currently     Comment: sober since      Social History     Substance and Sexual Activity   Drug Use Not Currently    Comment: CLEAN AND SOBER SINCE        Allergies   Allergen Reactions    Codeine GI Upset, Other and Nausea/vomiting    Grass Pollen Unknown     Current Outpatient Medications   Medication Sig Dispense Refill    benzoyl peroxide (Advanced Exfoliating Cleanser) 5 % external wash Apply to affected areas of skin as a wash, let set for 2 minutes, rinse thoroughly. Use daily in  g 11    clindamycin (Cleocin T) 1 % external solution Apply to affected areas twice daily. 60 mL 11    escitalopram (Lexapro) 20 mg tablet Take 1 tablet by mouth once daily. 90 tablet 1    gabapentin (Neurontin) 300 mg capsule Take one capsule every 8 hours and and 1 extra capsule capsule at bedtime 360 capsule 3    iron,carb/vit C/vit B12/folic (IRON 100 PLUS ORAL) Take by mouth.      levothyroxine (Synthroid, Levoxyl) 88 mcg tablet Take 1 tablet (88 mcg) by mouth early in the morning.. Take on an empty stomach at the same time each day, either 30 to 60 minutes prior to breakfast 90 tablet 1    omeprazole (PriLOSEC) 40 mg  "DR capsule Take 1 capsule by mouth once daily in the morning before a meal. Do not crush or chew. 90 capsule 3    ondansetron ODT (Zofran-ODT) 8 mg disintegrating tablet Dissolve 1 tablet (8 mg) in the mouth every 8 hours if needed for nausea or vomiting. 60 tablet 1    potassium chloride CR 20 mEq ER tablet Take 1 tablet (20 mEq) by mouth 2 times a day. Do not crush or chew. 180 tablet 3    propranolol (Inderal) 20 mg tablet Take 1 tablet (20 mg) by mouth 3 times a day as needed (for anxiety). 60 tablet 3    rosuvastatin (Crestor) 10 mg tablet Take 1 tablet (10 mg) by mouth once daily. 100 tablet 3     No current facility-administered medications for this visit.     Review of Systems   Constitutional: Negative.    HENT:  Positive for dental problem (loose upper front bridge).    Eyes: Negative.    Respiratory: Negative.     Cardiovascular: Negative.    Gastrointestinal:  Positive for diarrhea.   Genitourinary: Negative.    Musculoskeletal:  Positive for arthralgias.   Neurological: Negative.    Psychiatric/Behavioral:  The patient is nervous/anxious.      /85   Pulse 74   Temp 36.3 °C (97.3 °F) (Temporal)   Resp 16   Ht 1.651 m (5' 5\")   Wt 68 kg (150 lb)   SpO2 100%   BMI 24.96 kg/m²     Physical Exam  Vitals reviewed.   Constitutional:       Appearance: Normal appearance.   HENT:      Head: Normocephalic and atraumatic.      Mouth/Throat:      Mouth: Mucous membranes are moist.      Pharynx: Oropharynx is clear.   Eyes:      Extraocular Movements: Extraocular movements intact.      Pupils: Pupils are equal, round, and reactive to light.   Cardiovascular:      Rate and Rhythm: Normal rate and regular rhythm.      Heart sounds: Normal heart sounds.   Pulmonary:      Effort: Pulmonary effort is normal.      Breath sounds: Normal breath sounds.   Abdominal:      General: Bowel sounds are normal.      Palpations: Abdomen is soft.   Musculoskeletal:         General: No swelling.   Skin:     General: Skin " is warm and dry.   Neurological:      General: No focal deficit present.      Mental Status: She is alert and oriented to person, place, and time.   Psychiatric:         Mood and Affect: Mood normal.         Behavior: Behavior normal.        PAT AIRWAY:   Airway:     Mallampati::  II    TM distance::  >3 FB    Neck ROM::  Full   Loose upper front bridge, upper and lower partials noted, remaining teeth in fair condition.    ASA:  II  DASI SCORE:  36.7  METS SCORE:  7.3  CHAD2 SCORE:  1.9%  REVISED CARDIAC RISK INDEX:  0.4%  STOP BANG SCORE:  1  CAPRINI DVT SCORE:  4  CHAITANYA SCORE:  0.01%  ARISCAT SCORE:  1.6%    EKG (preliminary in PAT) - normal sinus rhythm, nonspecific ST and T wave abnormality    CBC 6/10/2025  BMP ordered during PAT visit    Assessment and Plan:     Atypical ductal hyperplasia of left breast:  Left breast lumpectomy with Magseed localization  COPD - mild, has not required rescue inhaler for past 10 years  Pre-diabetes - hemoglobin A1c was 5.3 on 3/11/2025  Hypothyroidism - taking levothyroxine  H/O drug abuse (cocaine/crack) - drug free since 2006    Lara Freeman PA-C

## 2025-06-30 NOTE — CPM/PAT H&P
"CPM/PAT Evaluation       Name: Paulie Byrd (Paulie Byrd)  /Age: 1970/54 y.o.     In-Person       Chief Complaint: \"having breast surgery\"    HPI  The patient is a 54 year old female.  She states she has a routine mammogram every year.  Her mammogram in  showed an abnormality in the left breast.  She denies breast pain, swelling, nipple discharge or skin changes.  She also denies a family history of breast cancer.  She underwent a left breast biopsy on 2025 that showed atypical ductal hyperplasia.  She was referred to Dr. Lou and surgical intervention is recommended.    Past Medical History:   Diagnosis Date    Anemia     Anxiety     Bilateral primary osteoarthritis of knee 2016    Degenerative arthritis of knee, bilateral    Bipolar 1 disorder (Multi)     Chronic pain syndrome 2018    Pain syndrome, chronic    Chronic pancreatitis (Multi)     Colon polyp     Contusion of lower back and pelvis, subsequent encounter 2016    Lumbar contusion, subsequent encounter    COPD (chronic obstructive pulmonary disease) (Multi)     Depression     Fibrocystic breast     GERD (gastroesophageal reflux disease)     Graves disease     Hyperlipidemia     Hypothyroidism     Kidney stone     Other tear of medial meniscus, current injury, right knee, subsequent encounter 2016    Acute medial meniscal tear, right, subsequent encounter    Personal history of other diseases of the musculoskeletal system and connective tissue 2015    History of muscle pain    Personal history of other diseases of the musculoskeletal system and connective tissue 2014    History of low back pain    Personal history of other specified conditions 2014    History of lump of right breast    Prediabetes     RLS (restless legs syndrome)        Past Surgical History:   Procedure Laterality Date    ADENOIDECTOMY      BREAST BIOPSY Left 2025    CHOLECYSTECTOMY      " HYSTERECTOMY      KNEE ARTHROPLASTY Bilateral     OTHER SURGICAL HISTORY      Cyst excision    SKIN BIOPSY       Family History   Problem Relation Name Age of Onset    Allergies Other      Colon polyps Father Vicente     Alcohol abuse Maternal Grandfather Maximiliano     Alcohol abuse Maternal Grandmother Vera     Colon polyps Maternal Grandmother Vera     Alcohol abuse Paternal Grandfather Lisandro     Alcohol abuse Son Noe     Alcohol abuse Mother's Sister Flory      Social History     Tobacco Use    Smoking status: Former     Current packs/day: 0.00     Average packs/day: 1 pack/day for 33.0 years (33.0 ttl pk-yrs)     Types: Cigarettes     Start date:      Quit date:      Years since quittin.4     Passive exposure: Past    Smokeless tobacco: Never   Substance Use Topics    Alcohol use: Not Currently     Comment: sober since      Social History     Substance and Sexual Activity   Drug Use Not Currently    Comment: CLEAN AND SOBER SINCE        Allergies   Allergen Reactions    Codeine GI Upset, Other and Nausea/vomiting    Grass Pollen Unknown     Current Outpatient Medications   Medication Sig Dispense Refill    benzoyl peroxide (Advanced Exfoliating Cleanser) 5 % external wash Apply to affected areas of skin as a wash, let set for 2 minutes, rinse thoroughly. Use daily in  g 11    clindamycin (Cleocin T) 1 % external solution Apply to affected areas twice daily. 60 mL 11    escitalopram (Lexapro) 20 mg tablet Take 1 tablet by mouth once daily. 90 tablet 1    gabapentin (Neurontin) 300 mg capsule Take one capsule every 8 hours and and 1 extra capsule capsule at bedtime 360 capsule 3    iron,carb/vit C/vit B12/folic (IRON 100 PLUS ORAL) Take by mouth.      levothyroxine (Synthroid, Levoxyl) 88 mcg tablet Take 1 tablet (88 mcg) by mouth early in the morning.. Take on an empty stomach at the same time each day, either 30 to 60 minutes prior to breakfast 90 tablet 1    omeprazole (PriLOSEC) 40 mg  "DR capsule Take 1 capsule by mouth once daily in the morning before a meal. Do not crush or chew. 90 capsule 3    ondansetron ODT (Zofran-ODT) 8 mg disintegrating tablet Dissolve 1 tablet (8 mg) in the mouth every 8 hours if needed for nausea or vomiting. 60 tablet 1    potassium chloride CR 20 mEq ER tablet Take 1 tablet (20 mEq) by mouth 2 times a day. Do not crush or chew. 180 tablet 3    propranolol (Inderal) 20 mg tablet Take 1 tablet (20 mg) by mouth 3 times a day as needed (for anxiety). 60 tablet 3    rosuvastatin (Crestor) 10 mg tablet Take 1 tablet (10 mg) by mouth once daily. 100 tablet 3     No current facility-administered medications for this visit.     Review of Systems   Constitutional: Negative.    HENT:  Positive for dental problem (loose upper front bridge).    Eyes: Negative.    Respiratory: Negative.     Cardiovascular: Negative.    Gastrointestinal:  Positive for diarrhea.   Genitourinary: Negative.    Musculoskeletal:  Positive for arthralgias.   Neurological: Negative.    Psychiatric/Behavioral:  The patient is nervous/anxious.      /85   Pulse 74   Temp 36.3 °C (97.3 °F) (Temporal)   Resp 16   Ht 1.651 m (5' 5\")   Wt 68 kg (150 lb)   SpO2 100%   BMI 24.96 kg/m²     Physical Exam  Vitals reviewed.   Constitutional:       Appearance: Normal appearance.   HENT:      Head: Normocephalic and atraumatic.      Mouth/Throat:      Mouth: Mucous membranes are moist.      Pharynx: Oropharynx is clear.   Eyes:      Extraocular Movements: Extraocular movements intact.      Pupils: Pupils are equal, round, and reactive to light.   Cardiovascular:      Rate and Rhythm: Normal rate and regular rhythm.      Heart sounds: Normal heart sounds.   Pulmonary:      Effort: Pulmonary effort is normal.      Breath sounds: Normal breath sounds.   Abdominal:      General: Bowel sounds are normal.      Palpations: Abdomen is soft.   Musculoskeletal:         General: No swelling.   Skin:     General: Skin " is warm and dry.   Neurological:      General: No focal deficit present.      Mental Status: She is alert and oriented to person, place, and time.   Psychiatric:         Mood and Affect: Mood normal.         Behavior: Behavior normal.        PAT AIRWAY:   Airway:     Mallampati::  II    TM distance::  >3 FB    Neck ROM::  Full   Loose upper front bridge, upper and lower partials noted, remaining teeth in fair condition.    ASA:  II  DASI SCORE:  36.7  METS SCORE:  7.3  CHAD2 SCORE:  1.9%  REVISED CARDIAC RISK INDEX:  0.4%  STOP BANG SCORE:  1  CAPRINI DVT SCORE:  4  CHAITANYA SCORE:  0.01%  ARISCAT SCORE:  1.6%    EKG (preliminary in PAT) - normal sinus rhythm, nonspecific ST and T wave abnormality    CBC 6/10/2025  BMP ordered during PAT visit    Assessment and Plan:     Atypical ductal hyperplasia of left breast:  Left breast lumpectomy with Magseed localization  COPD - mild, has not required rescue inhaler for past 10 years  Pre-diabetes - hemoglobin A1c was 5.3 on 3/11/2025  Hypothyroidism - taking levothyroxine  H/O drug abuse (cocaine/crack) - drug free since 2006    Lara Freeman PA-C

## 2025-07-01 LAB
ATRIAL RATE: 68 BPM
P AXIS: 18 DEGREES
P OFFSET: 205 MS
P ONSET: 152 MS
PR INTERVAL: 132 MS
Q ONSET: 218 MS
QRS COUNT: 12 BEATS
QRS DURATION: 90 MS
QT INTERVAL: 396 MS
QTC CALCULATION(BAZETT): 421 MS
QTC FREDERICIA: 413 MS
R AXIS: 50 DEGREES
T AXIS: 28 DEGREES
T OFFSET: 416 MS
VENTRICULAR RATE: 68 BPM

## 2025-07-04 LAB
ANION GAP SERPL CALCULATED.4IONS-SCNC: 10 MMOL/L (CALC) (ref 7–17)
BUN SERPL-MCNC: 13 MG/DL (ref 7–25)
BUN/CREAT SERPL: NORMAL (CALC) (ref 6–22)
CALCIUM SERPL-MCNC: 9.3 MG/DL (ref 8.6–10.4)
CHLORIDE SERPL-SCNC: 105 MMOL/L (ref 98–110)
CO2 SERPL-SCNC: 24 MMOL/L (ref 20–32)
CREAT SERPL-MCNC: 0.95 MG/DL (ref 0.5–1.03)
EGFRCR SERPLBLD CKD-EPI 2021: 71 ML/MIN/1.73M2
GLUCOSE SERPL-MCNC: 93 MG/DL (ref 65–99)
MAGNESIUM SERPL-MCNC: 2.1 MG/DL (ref 1.5–2.5)
POTASSIUM SERPL-SCNC: 3.9 MMOL/L (ref 3.5–5.3)
SODIUM SERPL-SCNC: 139 MMOL/L (ref 135–146)
TSH SERPL-ACNC: 1.54 MIU/L

## 2025-07-13 ENCOUNTER — ANESTHESIA EVENT (OUTPATIENT)
Dept: OPERATING ROOM | Facility: HOSPITAL | Age: 55
End: 2025-07-13
Payer: COMMERCIAL

## 2025-07-14 ENCOUNTER — HOSPITAL ENCOUNTER (OUTPATIENT)
Dept: RADIOLOGY | Facility: HOSPITAL | Age: 55
Discharge: HOME | End: 2025-07-14
Payer: COMMERCIAL

## 2025-07-14 ENCOUNTER — HOSPITAL ENCOUNTER (OUTPATIENT)
Facility: HOSPITAL | Age: 55
Setting detail: OUTPATIENT SURGERY
Discharge: HOME | End: 2025-07-14
Attending: SURGERY | Admitting: SURGERY
Payer: COMMERCIAL

## 2025-07-14 ENCOUNTER — ANESTHESIA (OUTPATIENT)
Dept: OPERATING ROOM | Facility: HOSPITAL | Age: 55
End: 2025-07-14
Payer: COMMERCIAL

## 2025-07-14 ENCOUNTER — PHARMACY VISIT (OUTPATIENT)
Dept: PHARMACY | Facility: CLINIC | Age: 55
End: 2025-07-14
Payer: COMMERCIAL

## 2025-07-14 VITALS
BODY MASS INDEX: 24.96 KG/M2 | DIASTOLIC BLOOD PRESSURE: 67 MMHG | TEMPERATURE: 97.3 F | HEART RATE: 78 BPM | OXYGEN SATURATION: 95 % | RESPIRATION RATE: 20 BRPM | WEIGHT: 150 LBS | SYSTOLIC BLOOD PRESSURE: 101 MMHG

## 2025-07-14 DIAGNOSIS — N60.92 ATYPICAL DUCTAL HYPERPLASIA OF LEFT BREAST: Primary | ICD-10-CM

## 2025-07-14 DIAGNOSIS — R92.8 OTHER ABNORMAL AND INCONCLUSIVE FINDINGS ON DIAGNOSTIC IMAGING OF BREAST: ICD-10-CM

## 2025-07-14 PROBLEM — K27.9 PUD (PEPTIC ULCER DISEASE): Status: ACTIVE | Noted: 2025-07-14

## 2025-07-14 PROCEDURE — 7100000002 HC RECOVERY ROOM TIME - EACH INCREMENTAL 1 MINUTE: Performed by: SURGERY

## 2025-07-14 PROCEDURE — 76098 X-RAY EXAM SURGICAL SPECIMEN: CPT

## 2025-07-14 PROCEDURE — 2500000005 HC RX 250 GENERAL PHARMACY W/O HCPCS: Performed by: NURSE ANESTHETIST, CERTIFIED REGISTERED

## 2025-07-14 PROCEDURE — 3600000009 HC OR TIME - EACH INCREMENTAL 1 MINUTE - PROCEDURE LEVEL FOUR: Performed by: SURGERY

## 2025-07-14 PROCEDURE — 7100000009 HC PHASE TWO TIME - INITIAL BASE CHARGE: Performed by: SURGERY

## 2025-07-14 PROCEDURE — 7100000010 HC PHASE TWO TIME - EACH INCREMENTAL 1 MINUTE: Performed by: SURGERY

## 2025-07-14 PROCEDURE — 3700000001 HC GENERAL ANESTHESIA TIME - INITIAL BASE CHARGE: Performed by: SURGERY

## 2025-07-14 PROCEDURE — 19301 PARTIAL MASTECTOMY: CPT | Performed by: PHYSICIAN ASSISTANT

## 2025-07-14 PROCEDURE — 88307 TISSUE EXAM BY PATHOLOGIST: CPT | Mod: TC,TRILAB | Performed by: SURGERY

## 2025-07-14 PROCEDURE — RXMED WILLOW AMBULATORY MEDICATION CHARGE

## 2025-07-14 PROCEDURE — 2500000004 HC RX 250 GENERAL PHARMACY W/ HCPCS (ALT 636 FOR OP/ED): Performed by: SURGERY

## 2025-07-14 PROCEDURE — 2720000007 HC OR 272 NO HCPCS: Performed by: SURGERY

## 2025-07-14 PROCEDURE — 19301 PARTIAL MASTECTOMY: CPT | Performed by: SURGERY

## 2025-07-14 PROCEDURE — A4649 SURGICAL SUPPLIES: HCPCS | Performed by: SURGERY

## 2025-07-14 PROCEDURE — 2500000004 HC RX 250 GENERAL PHARMACY W/ HCPCS (ALT 636 FOR OP/ED): Performed by: NURSE ANESTHETIST, CERTIFIED REGISTERED

## 2025-07-14 PROCEDURE — 3700000002 HC GENERAL ANESTHESIA TIME - EACH INCREMENTAL 1 MINUTE: Performed by: SURGERY

## 2025-07-14 PROCEDURE — 7100000001 HC RECOVERY ROOM TIME - INITIAL BASE CHARGE: Performed by: SURGERY

## 2025-07-14 PROCEDURE — 3600000004 HC OR TIME - INITIAL BASE CHARGE - PROCEDURE LEVEL FOUR: Performed by: SURGERY

## 2025-07-14 PROCEDURE — 76098 X-RAY EXAM SURGICAL SPECIMEN: CPT | Performed by: STUDENT IN AN ORGANIZED HEALTH CARE EDUCATION/TRAINING PROGRAM

## 2025-07-14 PROCEDURE — 2500000001 HC RX 250 WO HCPCS SELF ADMINISTERED DRUGS (ALT 637 FOR MEDICARE OP): Performed by: STUDENT IN AN ORGANIZED HEALTH CARE EDUCATION/TRAINING PROGRAM

## 2025-07-14 RX ORDER — CEFAZOLIN SODIUM 2 G/100ML
INJECTION, SOLUTION INTRAVENOUS AS NEEDED
Status: DISCONTINUED | OUTPATIENT
Start: 2025-07-14 | End: 2025-07-14

## 2025-07-14 RX ORDER — SODIUM CHLORIDE, SODIUM LACTATE, POTASSIUM CHLORIDE, CALCIUM CHLORIDE 600; 310; 30; 20 MG/100ML; MG/100ML; MG/100ML; MG/100ML
INJECTION, SOLUTION INTRAVENOUS CONTINUOUS PRN
Status: DISCONTINUED | OUTPATIENT
Start: 2025-07-14 | End: 2025-07-14

## 2025-07-14 RX ORDER — BUPIVACAINE HYDROCHLORIDE 5 MG/ML
INJECTION, SOLUTION PERINEURAL AS NEEDED
Status: DISCONTINUED | OUTPATIENT
Start: 2025-07-14 | End: 2025-07-14 | Stop reason: HOSPADM

## 2025-07-14 RX ORDER — ONDANSETRON HYDROCHLORIDE 2 MG/ML
INJECTION, SOLUTION INTRAVENOUS AS NEEDED
Status: DISCONTINUED | OUTPATIENT
Start: 2025-07-14 | End: 2025-07-14

## 2025-07-14 RX ORDER — DEXMEDETOMIDINE IN 0.9 % NACL 20 MCG/5ML
SYRINGE (ML) INTRAVENOUS AS NEEDED
Status: DISCONTINUED | OUTPATIENT
Start: 2025-07-14 | End: 2025-07-14

## 2025-07-14 RX ORDER — NORETHINDRONE AND ETHINYL ESTRADIOL 0.5-0.035
KIT ORAL AS NEEDED
Status: DISCONTINUED | OUTPATIENT
Start: 2025-07-14 | End: 2025-07-14

## 2025-07-14 RX ORDER — PROCHLORPERAZINE EDISYLATE 5 MG/ML
5 INJECTION INTRAMUSCULAR; INTRAVENOUS ONCE AS NEEDED
Status: CANCELLED | OUTPATIENT
Start: 2025-07-14

## 2025-07-14 RX ORDER — OXYCODONE HYDROCHLORIDE 5 MG/1
5 TABLET ORAL EVERY 4 HOURS PRN
Refills: 0 | Status: CANCELLED | OUTPATIENT
Start: 2025-07-14

## 2025-07-14 RX ORDER — MIDAZOLAM HYDROCHLORIDE 1 MG/ML
INJECTION, SOLUTION INTRAMUSCULAR; INTRAVENOUS AS NEEDED
Status: DISCONTINUED | OUTPATIENT
Start: 2025-07-14 | End: 2025-07-14

## 2025-07-14 RX ORDER — HYDROCODONE BITARTRATE AND ACETAMINOPHEN 5; 325 MG/1; MG/1
1 TABLET ORAL EVERY 6 HOURS PRN
Qty: 20 TABLET | Refills: 0 | Status: SHIPPED | OUTPATIENT
Start: 2025-07-14

## 2025-07-14 RX ORDER — PROPOFOL 10 MG/ML
INJECTION, EMULSION INTRAVENOUS AS NEEDED
Status: DISCONTINUED | OUTPATIENT
Start: 2025-07-14 | End: 2025-07-14

## 2025-07-14 RX ORDER — LIDOCAINE HYDROCHLORIDE 20 MG/ML
INJECTION, SOLUTION INFILTRATION; PERINEURAL AS NEEDED
Status: DISCONTINUED | OUTPATIENT
Start: 2025-07-14 | End: 2025-07-14

## 2025-07-14 RX ORDER — GLYCOPYRROLATE 0.2 MG/ML
INJECTION INTRAMUSCULAR; INTRAVENOUS AS NEEDED
Status: DISCONTINUED | OUTPATIENT
Start: 2025-07-14 | End: 2025-07-14

## 2025-07-14 RX ORDER — LIDOCAINE HYDROCHLORIDE AND EPINEPHRINE 10; 10 UG/ML; MG/ML
INJECTION, SOLUTION INFILTRATION; PERINEURAL AS NEEDED
Status: DISCONTINUED | OUTPATIENT
Start: 2025-07-14 | End: 2025-07-14 | Stop reason: HOSPADM

## 2025-07-14 RX ORDER — IBUPROFEN 400 MG/1
400 TABLET, FILM COATED ORAL EVERY 6 HOURS PRN
Status: CANCELLED | OUTPATIENT
Start: 2025-07-14

## 2025-07-14 RX ORDER — SODIUM CHLORIDE, SODIUM LACTATE, POTASSIUM CHLORIDE, CALCIUM CHLORIDE 600; 310; 30; 20 MG/100ML; MG/100ML; MG/100ML; MG/100ML
100 INJECTION, SOLUTION INTRAVENOUS CONTINUOUS
Status: CANCELLED | OUTPATIENT
Start: 2025-07-14 | End: 2025-07-15

## 2025-07-14 RX ORDER — FENTANYL CITRATE 50 UG/ML
INJECTION, SOLUTION INTRAMUSCULAR; INTRAVENOUS AS NEEDED
Status: DISCONTINUED | OUTPATIENT
Start: 2025-07-14 | End: 2025-07-14

## 2025-07-14 RX ORDER — LIDOCAINE HYDROCHLORIDE 10 MG/ML
0.1 INJECTION, SOLUTION INFILTRATION; PERINEURAL ONCE
Status: CANCELLED | OUTPATIENT
Start: 2025-07-14 | End: 2025-07-14

## 2025-07-14 RX ORDER — ACETAMINOPHEN 325 MG/1
650 TABLET ORAL ONCE
Status: COMPLETED | OUTPATIENT
Start: 2025-07-14 | End: 2025-07-14

## 2025-07-14 RX ADMIN — CEFAZOLIN SODIUM 2 G: 2 INJECTION, SOLUTION INTRAVENOUS at 08:18

## 2025-07-14 RX ADMIN — MIDAZOLAM 1 MG: 1 INJECTION INTRAMUSCULAR; INTRAVENOUS at 08:19

## 2025-07-14 RX ADMIN — GLYCOPYRROLATE 0.3 MG: 0.2 INJECTION INTRAMUSCULAR; INTRAVENOUS at 08:29

## 2025-07-14 RX ADMIN — LIDOCAINE HYDROCHLORIDE 60 MG: 20 INJECTION, SOLUTION INFILTRATION; PERINEURAL at 08:22

## 2025-07-14 RX ADMIN — DEXAMETHASONE SODIUM PHOSPHATE 8 MG: 4 INJECTION, SOLUTION INTRAMUSCULAR; INTRAVENOUS at 08:26

## 2025-07-14 RX ADMIN — EPHEDRINE SULFATE 20 MG: 50 INJECTION, SOLUTION INTRAVENOUS at 09:16

## 2025-07-14 RX ADMIN — ONDANSETRON 4 MG: 2 INJECTION, SOLUTION INTRAMUSCULAR; INTRAVENOUS at 08:53

## 2025-07-14 RX ADMIN — PROPOFOL 160 MG: 10 INJECTION, EMULSION INTRAVENOUS at 08:22

## 2025-07-14 RX ADMIN — FENTANYL CITRATE 50 MCG: 50 INJECTION, SOLUTION INTRAMUSCULAR; INTRAVENOUS at 08:36

## 2025-07-14 RX ADMIN — MIDAZOLAM 1 MG: 1 INJECTION INTRAMUSCULAR; INTRAVENOUS at 08:14

## 2025-07-14 RX ADMIN — Medication 6 MCG: at 08:30

## 2025-07-14 RX ADMIN — EPHEDRINE SULFATE 5 MG: 50 INJECTION, SOLUTION INTRAVENOUS at 08:39

## 2025-07-14 RX ADMIN — EPHEDRINE SULFATE 5 MG: 50 INJECTION, SOLUTION INTRAVENOUS at 08:33

## 2025-07-14 RX ADMIN — EPHEDRINE SULFATE 10 MG: 50 INJECTION, SOLUTION INTRAVENOUS at 08:46

## 2025-07-14 RX ADMIN — PROPOFOL 40 MG: 10 INJECTION, EMULSION INTRAVENOUS at 08:36

## 2025-07-14 RX ADMIN — FENTANYL CITRATE 50 MCG: 50 INJECTION, SOLUTION INTRAMUSCULAR; INTRAVENOUS at 08:22

## 2025-07-14 RX ADMIN — ACETAMINOPHEN 650 MG: 325 TABLET ORAL at 08:06

## 2025-07-14 RX ADMIN — SODIUM CHLORIDE, POTASSIUM CHLORIDE, SODIUM LACTATE AND CALCIUM CHLORIDE: 600; 310; 30; 20 INJECTION, SOLUTION INTRAVENOUS at 08:14

## 2025-07-14 SDOH — HEALTH STABILITY: MENTAL HEALTH: CURRENT SMOKER: 0

## 2025-07-14 ASSESSMENT — PAIN SCALES - GENERAL
PAIN_LEVEL: 0
PAINLEVEL_OUTOF10: 6
PAINLEVEL_OUTOF10: 0 - NO PAIN

## 2025-07-14 ASSESSMENT — PAIN - FUNCTIONAL ASSESSMENT
PAIN_FUNCTIONAL_ASSESSMENT: 0-10

## 2025-07-14 NOTE — OP NOTE
Lumpectomy w/Magseed Localization (L) Operative Note     Date: 2025  OR Location: [facility]    Name: Paulie Byrd, : 1970, Age: 54 y.o., MRN: [id number], Sex: female    Diagnosis  Pre-op Diagnosis      * Atypical ductal hyperplasia of left breast [N60.92] Post-op Diagnosis     * Atypical ductal hyperplasia of left breast [N60.92]     Procedures  Lumpectomy w/Magseed Localization  [address] - [facility]IAL      Surgeons      * Maria Elena Lou - Primary    Resident/Fellow/Other Assistant:  Surgeons and Role:     * Barry Barger PA-C - DANIELA First Assist    Staff:   Circulator: José Luis Vazquez Person: Olamide    Anesthesia Staff: Anesthesiologist: Jona Burger MD  CRNA: NIRMALA Shi-CRNA    Procedure Summary  Anesthesia: Anesthesia type not filed in the log.  ASA: II  Estimated Blood Loss: 1mL  Intra-op Medications:   Administrations occurring from 0800 to 0925 on 25:   Medication Name Total Dose   BUPivacaine HCl (Marcaine) 0.5 % (5 mg/mL) injection 5 mL   lidocaine-epinephrine (Xylocaine W/EPI) 1 %-1:100,000 injection 5 mL   ceFAZolin (Ancef) IV 2 g in 100 mL dextrose (iso) - premix 2 g   dexAMETHasone (Decadron) 4 mg/mL IV Syringe 2 mL 8 mg   dexMEDETOMidine 4 mcg/mL in NS syringe 6 mcg   ePHEDrine injection 20 mg   fentaNYL (Sublimaze) injection 50 mcg/mL 100 mcg   glycopyrrolate (Robinul) injection 0.3 mg   LR infusion Cannot be calculated   lidocaine (Xylocaine) injection 2 % 60 mg   midazolam (Versed) injection 1 mg/mL 2 mg   ondansetron (Zofran) 2 mg/mL injection 4 mg   propofol (Diprivan) injection 10 mg/mL 200 mg   acetaminophen (Tylenol) tablet 650 mg 650 mg              Anesthesia Record               Intraprocedure I/O Totals          Output    Est. Blood Loss 5 mL    Total Output 5 mL          Specimen:   ID Type Source Tests Collected by Time   1 : left breast magseed lumpectomy. short stitch=superior, long stitch=lateral Tissue BREAST LUMPECTOMY LEFT SURGICAL  PATHOLOGY EXAM Maria Elena Lou MD 7/14/2025 0841                 Drains and/or Catheters: * None in log *    Tourniquet Times:         Implants:     Findings: Radiographic confirmation of excision of the affected area    Indications: Paulie Byrd is an 54 y.o. female who is having surgery for Atypical ductal hyperplasia of left breast [N60.92].  Patient with previous core biopsy left breast revealing atypical ductal hyperplasia    The patient was seen in the preoperative area. The risks, benefits, complications, treatment options, non-operative alternatives, expected recovery and outcomes were discussed with the patient. The possibilities of reaction to medication, pulmonary aspiration, injury to surrounding structures, bleeding, recurrent infection, the need for additional procedures, failure to diagnose a condition, and creating a complication requiring transfusion or operation were discussed with the patient. The patient concurred with the proposed plan, giving informed consent.  The site of surgery was properly noted/marked if necessary per policy. The patient has been actively warmed in preoperative area. Preoperative antibiotics have been ordered and given within 1 hours of incision. Venous thrombosis prophylaxis have been ordered including bilateral sequential compression devices    Procedure Details: Patient was brought to Room in the supine position after undergoingmagseed placement  into the  breast. General anesthesia was obtained with LMA. Breast was prepped and draped in a sterile fashion.  Operation then continued with lumpectomy.  Mag seed probe was placed on the breast.  We are able to localize the area of the mag seed.  Marking pen was used to delineate the line of incision in the outer slightly upper quadrant of the left  breast. Local was infiltrated in the tissue.A first assistant needed to achieve adequate exposure during the dissection.  Fifteen blade scalpel was used to make a 3  centimeter incision in the skin.  The mag seed probe was placed into the incision and the direction of the seed was determined.   lesion was excised using electrocautery.  The mag seed probe was used quite often to check the location of the seed during the excision.  After it was excised it was marked appropriately with a short stitch superior and long stitch lateral It was sent to the Radiology Department who confirmed the presence of the affected area. The lumpectomy cavity was then irrigated with water. Hemostasis maintained with electrocautery. Incision was then closed using interrupted 3-0 Vicryl followed by a running 4-0 Monocryl. Steri-Strips and sterile dressings were applied patient tolerated procedure well LMA was removed in the operating room and she was transferred to recovery room with rails up all counts were good this concludes this dictation please send a copy to myself and to the patients primary care physician.    Evidence of Infection: No   Complications:  None; patient tolerated the procedure well.    Disposition: PACU - hemodynamically stable.  Condition: stable             Task Performed by DANIELA First Assist or Physician Assistant:   Barry SILVA/NP, was necessary to assist on this case due to the nature of the case and difficulty. During the case he served as my assist by exposure      Additional Details:     Attending Attestation: I was present and scrubbed for the entire procedure.    Maria Elena Lou  Phone Number: [phone]

## 2025-07-14 NOTE — ANESTHESIA PREPROCEDURE EVALUATION
Patient: Paulie Byrd    Procedure Information       Date/Time: 07/14/25 0800    Procedure: Lumpectomy w/Magseed Localization (Left)    Location: [facility] / [facility]    Surgeons: Maria Elena Lou MD            Relevant Problems   Cardiac   (+) Hyperlipidemia      Pulmonary   (+) COPD (chronic obstructive pulmonary disease) (Multi)      Neuro   (+) Anxiety   (+) Bipolar 2 disorder (Multi)   (+) Depression with anxiety      /Renal   (+) Acute cystitis   (+) Right nephrolithiasis      Endocrine   (+) Graves disease   (+) Secondary hypothyroidism      Hematology   (+) Iron deficiency anemia due to chronic blood loss      HEENT   (+) Seasonal allergies      ID   (+) COVID   (+) COVID-19      Skin   (+) Rash and other nonspecific skin eruption       Clinical information reviewed:   Tobacco  Allergies  Meds   Med Hx  Surg Hx  OB Status  Fam Hx  Soc   Hx        NPO Detail:  NPO/Void Status  Carbohydrate Drink Given Prior to Surgery? : N  Date of Last Liquid: 07/14/25  Time of Last Liquid: 0500  Date of Last Solid: 07/13/25  Time of Last Solid: 2200  Last Intake Type: Clear fluids  Time of Last Void: 0600         Physical Exam    Airway  Mallampati: II  TM distance: >3 FB  Neck ROM: full  Mouth opening: 3 or more finger widths     Cardiovascular - normal exam   Dental - normal exam     Pulmonary - normal exam   Abdominal - normal exam           Anesthesia Plan    History of general anesthesia?: yes  History of complications of general anesthesia?: no    ASA 2     general     The patient is not a current smoker.  Patient did not smoke on day of procedure.  Education provided regarding risk of obstructive sleep apnea.  intravenous induction   Postoperative pain plan includes opioids.  Trial extubation is planned.  Anesthetic plan and risks discussed with patient.  Use of blood products discussed with patient who consented to blood products.    Plan discussed with CRNA.

## 2025-07-14 NOTE — ANESTHESIA POSTPROCEDURE EVALUATION
Patient: Paulie Byrd    Procedure Summary       Date: 07/14/25 Room / Location: [facility] / [facility]    Anesthesia Start: 0817 Anesthesia Stop: 0917    Procedure: Lumpectomy w/Magseed Localization (Left) Diagnosis:       Atypical ductal hyperplasia of left breast      (Atypical ductal hyperplasia of left breast [N60.92])    Surgeons: Maria Elena Lou MD Responsible Provider: Jona Burger MD    Anesthesia Type: general ASA Status: 2            Anesthesia Type: general    Vitals Value Taken Time   /76 07/14/25 09:50   Temp 36.3 °C (97.3 °F) 07/14/25 09:45   Pulse 67 07/14/25 09:53   Resp 10 07/14/25 09:53   SpO2 98 % 07/14/25 09:54   Vitals shown include unfiled device data.    Anesthesia Post Evaluation    Patient location during evaluation: PACU  Patient participation: complete - patient participated  Level of consciousness: sleepy but conscious  Pain score: 0  Pain management: adequate  Multimodal analgesia pain management approach  Airway patency: patent  Two or more strategies used to mitigate risk of obstructive sleep apnea  Cardiovascular status: acceptable and stable  Respiratory status: acceptable, airway suctioned, nonlabored ventilation and nasal cannula  Hydration status: acceptable  Postoperative Nausea and Vomiting: none        There were no known notable events for this encounter.

## 2025-07-14 NOTE — ANESTHESIA PROCEDURE NOTES
Airway  Date/Time: 7/14/2025 8:24 AM  Reason: elective    Airway not difficult    Staffing  Performed: CRNA   Authorized by: Jona Burger MD    Performed by: NIRMALA Shi-CHUY  Patient location during procedure: OR    Patient Condition  Indications for airway management: anesthesia  Patient position: sniffing  MILS maintained throughout  Planned trial extubation  Sedation level: deep     Final Airway Details   Preoxygenated: yes  Final airway type: supraglottic airway  Successful airway: classic  Size: 3   Ventilation between attempts: none  Number of attempts at approach: 1  Number of other approaches attempted: 0    Additional Comments  No change to oral cavity/ dentition.

## 2025-07-14 NOTE — POST-PROCEDURE NOTE
Pt arrived to [facility] via cart.  Crackers and gingerale taken well.  Up to  bathroom to void no problems voiding.  Spouse in room with pt  ice bag sent home with pt

## 2025-07-15 ASSESSMENT — PAIN SCALES - GENERAL: PAINLEVEL_OUTOF10: 3

## 2025-07-16 DIAGNOSIS — B37.0 THRUSH: Primary | ICD-10-CM

## 2025-07-16 RX ORDER — NYSTATIN 100000 [USP'U]/ML
5 SUSPENSION ORAL 4 TIMES DAILY
Qty: 280 ML | Refills: 0 | Status: SHIPPED | OUTPATIENT
Start: 2025-07-16 | End: 2025-07-30

## 2025-07-21 ENCOUNTER — TELEPHONE (OUTPATIENT)
Dept: SURGERY | Facility: CLINIC | Age: 55
End: 2025-07-21
Payer: COMMERCIAL

## 2025-07-21 NOTE — TELEPHONE ENCOUNTER
Called patient today to go over surgical pathology results per Dr. Lou. Patient did not answer the phone, left message for patient to call the office back to discuss results. Network message was also sent to patient as well letting her know no cancer was found.    Traci Fabian CMA

## 2025-07-24 NOTE — PROGRESS NOTES
Subjective   Patient ID: Paulie Byrd is a 54 y.o. female who presents for S/P Left Breast Lumpectomy w/Magseed Localization 7/14/2025.  HPI  Patient returns to clinic and presents for S/P Left Breast Lumpectomy w/Magseed Localization 7/14/2025.  Patient was last seen in clinic on 5/8/2025 for discussion of stereotactic guided breast left localization and breast biopsy clip imaging.   Findings: Radiographic confirmation of excision of the affected area   Surgical pathology was collected and finalized.    FINAL DIAGNOSIS   A. Left breast, magseed localized partial mastectomy:  -- Atypical ductal hyperplasia.   -- Usual ductal hyperplasia.   -- Columnar cell change.  -- Microscopic intraductal papillomas.   -- Adenosis.   -- Pseudoangiomatous stromal hyperplasia.  -- Microcysts.   -- Previous biopsy site changes.   -- Microcalcifications associated with columnar cell change, microcysts, unremarkable ductules, stroma and vessel walls.      Social History:  Tobacco Use - YES  Do you use any recreational drugs - NO  Alcohol Use - NO  Caffeine Intake - YES  Working - FULL TIME   Marital status -   Living with -  SPOUSE    Past Medical History:   Diagnosis Date    Anemia     Anxiety     Bilateral primary osteoarthritis of knee 01/19/2016    Degenerative arthritis of knee, bilateral    Bipolar 1 disorder (Multi)     Chronic pain syndrome 06/06/2018    Pain syndrome, chronic    Chronic pancreatitis (Multi)     Colon polyp     Contusion of lower back and pelvis, subsequent encounter 09/07/2016    Lumbar contusion, subsequent encounter    COPD (chronic obstructive pulmonary disease) (Multi)     Depression     Fibrocystic breast 2011    GERD (gastroesophageal reflux disease)     Graves disease     Hyperlipidemia     Hypothyroidism     Kidney stone     Other tear of medial meniscus, current injury, right knee, subsequent encounter 01/19/2016    Acute medial meniscal tear, right, subsequent encounter    Personal  "history of other diseases of the musculoskeletal system and connective tissue 11/11/2015    History of muscle pain    Personal history of other diseases of the musculoskeletal system and connective tissue 12/29/2014    History of low back pain    Personal history of other specified conditions 12/11/2014    History of lump of right breast    Prediabetes     RLS (restless legs syndrome)      Family History   Problem Relation Name Age of Onset    Allergies Other      Colon polyps Father Vicente     Alcohol abuse Maternal Grandfather Maximiliano     Alcohol abuse Maternal Grandmother Vera     Colon polyps Maternal Grandmother Vera     Alcohol abuse Paternal Grandfather Lisandro     Alcohol abuse Son Noe     Alcohol abuse Mother's Sister Flory      Past Surgical History:   Procedure Laterality Date    ADENOIDECTOMY      BREAST BIOPSY Left 04/23/2025    BREAST LUMPECTOMY W/ NEEDLE LOCALIZATION Left 07/14/2025    CHOLECYSTECTOMY  1990    HYSTERECTOMY  1997    KNEE ARTHROPLASTY Bilateral     OTHER SURGICAL HISTORY      Cyst excision    SKIN BIOPSY       Allergies   Allergen Reactions    Codeine GI Upset, Other and Nausea/vomiting    Grass Pollen Unknown       Review of Systems  BP 95/78 (BP Location: Right arm, Patient Position: Sitting, BP Cuff Size: Adult)   Pulse 80   Temp 36.5 °C (97.7 °F) (Temporal)   Resp 17   Ht 1.651 m (5' 5\")   Wt 68 kg (150 lb)   SpO2 96%   BMI 24.96 kg/m²    Objective   Physical Exam  Left breast incision clean dry and intact.  Patient had some minimal tape wounds.  Assessment/Plan   Problem List Items Addressed This Visit           ICD-10-CM    Atypical ductal hyperplasia of left breast - Primary N60.92    Status post lumpectomy for atypical ductal hyperplasia of the left breast.  Excellent healing.  No upgrade.  Patient is referred to high risk nurse practitioner for discussion of prophylactic endocrine therapy.  Patient needs to follow-up with the high risk nurse practitioner for future " mammograms and breast checks.             Breast History:  Patient is new to clinic and presents for Left stereo breast biopsy consult, bx on 4/23.  Patient is referred by Dr. Mukesh Yeung.  Patient had BI mammogram bilateral screening tomosynthesis completed on 3/14/2025. Impression was Right breast asymmetry and left breast calcifications. Diagnostic mammogram and possible ultrasound imaging is recommended.  Patient had BI mammogram bilateral diagnostic tomosynthesis and BI US breast limited left completed on 4/7/2025. Impression was Left breast: Suspicious left breast microcalcifications. Biopsy recommended. Stereotactic biopsy can be offered. Right breast: No abnormalities seen.   Denies any previous breast biopsies.  No palpable lesions in either breast.  No nipple discharge.  Patient does have a history of bone marrow biopsy.  They were unable to obtain adequate fluid.  This was for mild anemia.  Patient is also had colonoscopies for this reason.  With abnormal microcalcifications upper outer quadrant left breast. Patient to be scheduled for stereotactic biopsy of the breast in the radiology department. Risk, benefits and alternatives to this plan were thoroughly discussed with the patient who agrees to proceed.  The procedure was also thoroughly discussed as well as her follow-up. She is to see me in the office in one week but I also will call as soon as I see the pathology which is usually 4 working days from procedure.   Patient returns to the clinic to discuss the results from her stereotactic guided breast left localization and breast biopsy clip imaging on 04/23/2025.  Patient was last seen in the clinic on 4/11/2025 to discuss her biopsy  Patient status post core biopsy with pathology revealing atypical ductal and lobular hyperplasia.  At this point recommending Magseed localization lumpectomy.  Patient was informed there is up to 20% risk of increased upgrade to an early cancer in the area with  lumpectomy.  Risk-benefit alternatives of doing a lumpectomy were thoroughly discussed with the patient agrees to proceed.  Patient also informed even with negative open biopsy she has 20% 10year risk of cancer do to atypical hyperplasia diagnosis and this can be reduced to 7.5% risk with hormone therapy.  She will see an oncologist for further discussion of hormone therapy and side effects  Previous Biopsy: NO Menarche Age: 9 Age of first delivery: 22 Breastfeed: YES Menopause age: PARTIAL HYSTERECTOMY AT 27 HRT: NO Family history of breast cancer: UNSURE Family history of ovarian cancer: NO Family history of pancreatic cancer: NO G 2 P 2    Maria Elena Lou MD 07/28/25 3:33 PM

## 2025-07-28 ENCOUNTER — OFFICE VISIT (OUTPATIENT)
Dept: SURGERY | Facility: CLINIC | Age: 55
End: 2025-07-28
Payer: COMMERCIAL

## 2025-07-28 VITALS
RESPIRATION RATE: 17 BRPM | WEIGHT: 150 LBS | OXYGEN SATURATION: 96 % | SYSTOLIC BLOOD PRESSURE: 95 MMHG | TEMPERATURE: 97.7 F | HEIGHT: 65 IN | DIASTOLIC BLOOD PRESSURE: 78 MMHG | BODY MASS INDEX: 24.99 KG/M2 | HEART RATE: 80 BPM

## 2025-07-28 DIAGNOSIS — N60.92 ATYPICAL DUCTAL HYPERPLASIA OF LEFT BREAST: Primary | ICD-10-CM

## 2025-07-28 PROCEDURE — 99213 OFFICE O/P EST LOW 20 MIN: CPT | Performed by: SURGERY

## 2025-07-28 PROCEDURE — 99024 POSTOP FOLLOW-UP VISIT: CPT | Performed by: SURGERY

## 2025-07-28 PROCEDURE — 3008F BODY MASS INDEX DOCD: CPT | Performed by: SURGERY

## 2025-07-28 ASSESSMENT — PAIN SCALES - GENERAL: PAINLEVEL_OUTOF10: 0-NO PAIN

## 2025-07-28 ASSESSMENT — LIFESTYLE VARIABLES
HOW OFTEN DO YOU HAVE A DRINK CONTAINING ALCOHOL: NEVER
HOW MANY STANDARD DRINKS CONTAINING ALCOHOL DO YOU HAVE ON A TYPICAL DAY: PATIENT DOES NOT DRINK
SKIP TO QUESTIONS 9-10: 1
HOW OFTEN DO YOU HAVE SIX OR MORE DRINKS ON ONE OCCASION: NEVER
AUDIT-C TOTAL SCORE: 0

## 2025-07-28 ASSESSMENT — ENCOUNTER SYMPTOMS
LOSS OF SENSATION IN FEET: 0
DEPRESSION: 0
OCCASIONAL FEELINGS OF UNSTEADINESS: 0

## 2025-07-28 ASSESSMENT — PATIENT HEALTH QUESTIONNAIRE - PHQ9
2. FEELING DOWN, DEPRESSED OR HOPELESS: NOT AT ALL
SUM OF ALL RESPONSES TO PHQ9 QUESTIONS 1 & 2: 0
1. LITTLE INTEREST OR PLEASURE IN DOING THINGS: NOT AT ALL

## 2025-07-28 NOTE — ASSESSMENT & PLAN NOTE
Status post lumpectomy for atypical ductal hyperplasia of the left breast.  Excellent healing.  No upgrade.  Patient is referred to high risk nurse practitioner for discussion of prophylactic endocrine therapy.  Patient needs to follow-up with the high risk nurse practitioner for future mammograms and breast checks.

## 2025-07-31 DIAGNOSIS — R19.7 DIARRHEA, UNSPECIFIED TYPE: ICD-10-CM

## 2025-07-31 DIAGNOSIS — E87.6 HYPOKALEMIA: Primary | ICD-10-CM

## 2025-08-01 ENCOUNTER — DOCUMENTATION (OUTPATIENT)
Dept: CARE COORDINATION | Age: 55
End: 2025-08-01
Payer: COMMERCIAL

## 2025-08-01 LAB
ANION GAP SERPL CALCULATED.4IONS-SCNC: 14 MMOL/L (CALC) (ref 7–17)
BUN SERPL-MCNC: 23 MG/DL (ref 7–25)
BUN/CREAT SERPL: ABNORMAL (CALC) (ref 6–22)
CALCIUM SERPL-MCNC: 9.9 MG/DL (ref 8.6–10.4)
CHLORIDE SERPL-SCNC: 101 MMOL/L (ref 98–110)
CO2 SERPL-SCNC: 20 MMOL/L (ref 20–32)
CREAT SERPL-MCNC: 1.01 MG/DL (ref 0.5–1.03)
EGFRCR SERPLBLD CKD-EPI 2021: 66 ML/MIN/1.73M2
GLUCOSE SERPL-MCNC: 103 MG/DL (ref 65–99)
POTASSIUM SERPL-SCNC: 2.9 MMOL/L (ref 3.5–5.3)
SODIUM SERPL-SCNC: 135 MMOL/L (ref 135–146)

## 2025-08-01 NOTE — PROGRESS NOTES
DATE OF CALL: 8/1/25    TIME OF CALL: 0819    RESULT CALLED BY: [facility]    RESULT CALLED: K+ 2.9, Glucose 103    NOTIFIED: Dr. Mukesh Yeung  DATE NOTIFICATION:8/1/25  TIME NOTIFICATION: 0825    R/B VERIFIED: YES Dr. Yeung replied on secure chat     DID RESULT HAVE TO BE ESCALATED? No  TO WHOM? N/A

## 2025-08-05 LAB
ANION GAP SERPL CALCULATED.4IONS-SCNC: 13 MMOL/L (CALC) (ref 7–17)
BUN SERPL-MCNC: 22 MG/DL (ref 7–25)
BUN/CREAT SERPL: 18 (CALC) (ref 6–22)
CALCIUM SERPL-MCNC: 8.7 MG/DL (ref 8.6–10.4)
CHLORIDE SERPL-SCNC: 106 MMOL/L (ref 98–110)
CO2 SERPL-SCNC: 17 MMOL/L (ref 20–32)
CREAT SERPL-MCNC: 1.24 MG/DL (ref 0.5–1.03)
EGFRCR SERPLBLD CKD-EPI 2021: 51 ML/MIN/1.73M2
GLUCOSE SERPL-MCNC: 135 MG/DL (ref 65–99)
POTASSIUM SERPL-SCNC: 3 MMOL/L (ref 3.5–5.3)
SODIUM SERPL-SCNC: 136 MMOL/L (ref 135–146)

## 2025-08-05 RX ORDER — POTASSIUM CHLORIDE 1.5 G/1.58G
40 POWDER, FOR SOLUTION ORAL 2 TIMES DAILY
Qty: 120 PACKET | Refills: 2 | Status: SHIPPED | OUTPATIENT
Start: 2025-08-05 | End: 2025-11-03

## 2025-08-12 LAB
ANION GAP SERPL CALCULATED.4IONS-SCNC: 10 MMOL/L (CALC) (ref 7–17)
BUN SERPL-MCNC: 21 MG/DL (ref 7–25)
BUN/CREAT SERPL: ABNORMAL (CALC) (ref 6–22)
CALCIUM SERPL-MCNC: 9.1 MG/DL (ref 8.6–10.4)
CHLORIDE SERPL-SCNC: 106 MMOL/L (ref 98–110)
CO2 SERPL-SCNC: 23 MMOL/L (ref 20–32)
CREAT SERPL-MCNC: 0.89 MG/DL (ref 0.5–1.03)
EGFRCR SERPLBLD CKD-EPI 2021: 76 ML/MIN/1.73M2
GLUCOSE SERPL-MCNC: 107 MG/DL (ref 65–99)
MAGNESIUM SERPL-MCNC: 2.1 MG/DL (ref 1.5–2.5)
POTASSIUM SERPL-SCNC: 3.4 MMOL/L (ref 3.5–5.3)
SODIUM SERPL-SCNC: 139 MMOL/L (ref 135–146)

## 2025-08-21 LAB
C COLI+JEJUNI+LARI FUSA STL QL NAA+PROBE: NOT DETECTED
CRYPTOSP AG STL QL IA: NORMAL
EC STX1 GENE STL QL NAA+PROBE: NOT DETECTED
EC STX2 GENE STL QL NAA+PROBE: NOT DETECTED
G LAMBLIA AG STL QL IA: NORMAL
NOROV GI+II ORF1-ORF2 JNC STL QL NAA+PR: NOT DETECTED
O+P STL CONC: NORMAL
O+P STL TRI STN: NORMAL
RVA NSP5 STL QL NAA+PROBE: NOT DETECTED
SALMONELLA SP RPOD STL QL NAA+PROBE: NOT DETECTED
SHIGELLA DNA SPEC QL NAA+PROBE: NOT DETECTED
V CHOL+PARA RFBL+TRKH+TNAA STL QL NAA+PR: NOT DETECTED
Y ENTERO RECN STL QL NAA+PROBE: NOT DETECTED

## 2025-09-02 LAB
C COLI+JEJUNI+LARI FUSA STL QL NAA+PROBE: NOT DETECTED
CRYPTOSP AG STL QL IA: NORMAL
EC STX1 GENE STL QL NAA+PROBE: NOT DETECTED
EC STX2 GENE STL QL NAA+PROBE: NOT DETECTED
G LAMBLIA AG STL QL IA: NORMAL
NOROV GI+II ORF1-ORF2 JNC STL QL NAA+PR: NOT DETECTED
O+P STL TRI STN: NORMAL
RVA NSP5 STL QL NAA+PROBE: NOT DETECTED
SALMONELLA SP RPOD STL QL NAA+PROBE: NOT DETECTED
SHIGELLA DNA SPEC QL NAA+PROBE: NOT DETECTED
V CHOL+PARA RFBL+TRKH+TNAA STL QL NAA+PR: NOT DETECTED
Y ENTERO RECN STL QL NAA+PROBE: NOT DETECTED

## (undated) DEVICE — DRAPE, TAPE, ORTHO

## (undated) DEVICE — SUTURE, SILK, 2-0, 30 IN, SH, BLACK

## (undated) DEVICE — GLOVE, SURGICAL, PROTEXIS PI , 6.5, PF, LF

## (undated) DEVICE — SLEEVE, VASO PRESS, CALF GARMENT, MEDIUM, GREEN

## (undated) DEVICE — ELECTRODE, ELECTROSURGICAL, BLADE, INSULATED, ENT/IMA, STERILE

## (undated) DEVICE — STRIP, SKIN CLOSURE, STERI STRIP, REINFORCED, 0.5 X 4 IN

## (undated) DEVICE — SUTURE, VICRYL, 3-0, 27 IN, SH

## (undated) DEVICE — Device

## (undated) DEVICE — DRAPE, LEGGINGS, 48 X 31 IN, STERILE, LF

## (undated) DEVICE — TIP, SUCTION, YANKAUER, BULB, ADULT

## (undated) DEVICE — BLADE, SURGICAL, POLYMER COATED P15, STERILE, DISP

## (undated) DEVICE — SUTURE, VICRYL, 2-0, 27 IN, SH, UNDYED

## (undated) DEVICE — SUTURE, VICRYL, 3-0, 54 IN, CTD, UNDYED

## (undated) DEVICE — SUTURE, PROLENE, 2-0, 30 IN, SH, BLUE

## (undated) DEVICE — SOLUTION, IRRIGATION, STERILE WATER, 1000 ML, POUR BOTTLE

## (undated) DEVICE — WATER STERILE, FOR IRRIGATION, 1000ML, W/HANGER

## (undated) DEVICE — PROBE COVER, INTRAOPERATIVE, 13 X 244CM (5 X 96IN)

## (undated) DEVICE — SUTURE, MONOCRYL, 4-0, 27 IN, PS-2, UNDYED